# Patient Record
Sex: FEMALE | Race: WHITE | NOT HISPANIC OR LATINO
[De-identification: names, ages, dates, MRNs, and addresses within clinical notes are randomized per-mention and may not be internally consistent; named-entity substitution may affect disease eponyms.]

---

## 2017-01-06 ENCOUNTER — MEDICATION RENEWAL (OUTPATIENT)
Age: 67
End: 2017-01-06

## 2017-02-02 ENCOUNTER — APPOINTMENT (OUTPATIENT)
Dept: FAMILY MEDICINE | Facility: CLINIC | Age: 67
End: 2017-02-02

## 2017-02-02 VITALS — RESPIRATION RATE: 16 BRPM | SYSTOLIC BLOOD PRESSURE: 144 MMHG | DIASTOLIC BLOOD PRESSURE: 90 MMHG

## 2017-02-06 ENCOUNTER — MEDICATION RENEWAL (OUTPATIENT)
Age: 67
End: 2017-02-06

## 2017-04-06 ENCOUNTER — MEDICATION RENEWAL (OUTPATIENT)
Age: 67
End: 2017-04-06

## 2017-04-07 ENCOUNTER — RX RENEWAL (OUTPATIENT)
Age: 67
End: 2017-04-07

## 2017-04-10 ENCOUNTER — APPOINTMENT (OUTPATIENT)
Dept: FAMILY MEDICINE | Facility: CLINIC | Age: 67
End: 2017-04-10

## 2017-04-10 VITALS
WEIGHT: 228 LBS | BODY MASS INDEX: 40.4 KG/M2 | SYSTOLIC BLOOD PRESSURE: 136 MMHG | DIASTOLIC BLOOD PRESSURE: 84 MMHG | HEIGHT: 63 IN

## 2017-04-11 LAB
ALBUMIN SERPL ELPH-MCNC: 4.3 G/DL
ALP BLD-CCNC: 109 U/L
ALT SERPL-CCNC: 19 U/L
ANION GAP SERPL CALC-SCNC: 17 MMOL/L
AST SERPL-CCNC: 17 U/L
BASOPHILS # BLD AUTO: 0.02 K/UL
BASOPHILS NFR BLD AUTO: 0.3 %
BILIRUB SERPL-MCNC: 0.6 MG/DL
BUN SERPL-MCNC: 14 MG/DL
CALCIUM SERPL-MCNC: 9.5 MG/DL
CHLORIDE SERPL-SCNC: 104 MMOL/L
CO2 SERPL-SCNC: 24 MMOL/L
CREAT SERPL-MCNC: 1.07 MG/DL
EOSINOPHIL # BLD AUTO: 0.12 K/UL
EOSINOPHIL NFR BLD AUTO: 1.8 %
GLUCOSE SERPL-MCNC: 96 MG/DL
HCT VFR BLD CALC: 41.4 %
HGB BLD-MCNC: 13.4 G/DL
IMM GRANULOCYTES NFR BLD AUTO: 0 %
LYMPHOCYTES # BLD AUTO: 1.12 K/UL
LYMPHOCYTES NFR BLD AUTO: 16.9 %
MAN DIFF?: NORMAL
MCHC RBC-ENTMCNC: 28.6 PG
MCHC RBC-ENTMCNC: 32.4 GM/DL
MCV RBC AUTO: 88.5 FL
MONOCYTES # BLD AUTO: 0.49 K/UL
MONOCYTES NFR BLD AUTO: 7.4 %
NEUTROPHILS # BLD AUTO: 4.87 K/UL
NEUTROPHILS NFR BLD AUTO: 73.6 %
PLATELET # BLD AUTO: 279 K/UL
POTASSIUM SERPL-SCNC: 3.9 MMOL/L
PROT SERPL-MCNC: 6.3 G/DL
RBC # BLD: 4.68 M/UL
RBC # FLD: 12.9 %
SODIUM SERPL-SCNC: 145 MMOL/L
WBC # FLD AUTO: 6.62 K/UL

## 2017-05-08 ENCOUNTER — APPOINTMENT (OUTPATIENT)
Dept: FAMILY MEDICINE | Facility: CLINIC | Age: 67
End: 2017-05-08

## 2017-05-08 VITALS
WEIGHT: 228 LBS | DIASTOLIC BLOOD PRESSURE: 82 MMHG | HEIGHT: 63 IN | SYSTOLIC BLOOD PRESSURE: 130 MMHG | BODY MASS INDEX: 40.4 KG/M2

## 2017-06-09 ENCOUNTER — MEDICATION RENEWAL (OUTPATIENT)
Age: 67
End: 2017-06-09

## 2017-06-20 ENCOUNTER — MEDICATION RENEWAL (OUTPATIENT)
Age: 67
End: 2017-06-20

## 2017-08-07 ENCOUNTER — MEDICATION RENEWAL (OUTPATIENT)
Age: 67
End: 2017-08-07

## 2017-08-08 ENCOUNTER — APPOINTMENT (OUTPATIENT)
Dept: FAMILY MEDICINE | Facility: CLINIC | Age: 67
End: 2017-08-08
Payer: MEDICARE

## 2017-08-08 VITALS
HEIGHT: 63 IN | WEIGHT: 220 LBS | BODY MASS INDEX: 38.98 KG/M2 | DIASTOLIC BLOOD PRESSURE: 82 MMHG | SYSTOLIC BLOOD PRESSURE: 132 MMHG

## 2017-08-08 PROCEDURE — 99213 OFFICE O/P EST LOW 20 MIN: CPT

## 2017-08-08 RX ORDER — BUPROPION HYDROCHLORIDE 150 MG/1
150 TABLET, EXTENDED RELEASE ORAL
Qty: 30 | Refills: 0 | Status: DISCONTINUED | COMMUNITY
Start: 2017-04-07 | End: 2017-08-08

## 2017-09-08 ENCOUNTER — MEDICATION RENEWAL (OUTPATIENT)
Age: 67
End: 2017-09-08

## 2017-09-18 ENCOUNTER — MEDICATION RENEWAL (OUTPATIENT)
Age: 67
End: 2017-09-18

## 2017-10-09 ENCOUNTER — MEDICATION RENEWAL (OUTPATIENT)
Age: 67
End: 2017-10-09

## 2017-10-16 ENCOUNTER — MEDICATION RENEWAL (OUTPATIENT)
Age: 67
End: 2017-10-16

## 2017-11-08 ENCOUNTER — APPOINTMENT (OUTPATIENT)
Dept: FAMILY MEDICINE | Facility: CLINIC | Age: 67
End: 2017-11-08
Payer: MEDICARE

## 2017-11-08 VITALS
WEIGHT: 224 LBS | RESPIRATION RATE: 18 BRPM | TEMPERATURE: 99.4 F | BODY MASS INDEX: 39.69 KG/M2 | HEART RATE: 69 BPM | HEIGHT: 63 IN | SYSTOLIC BLOOD PRESSURE: 144 MMHG | DIASTOLIC BLOOD PRESSURE: 92 MMHG | OXYGEN SATURATION: 97 %

## 2017-11-08 PROCEDURE — 99214 OFFICE O/P EST MOD 30 MIN: CPT | Mod: 25

## 2017-11-08 PROCEDURE — 36415 COLL VENOUS BLD VENIPUNCTURE: CPT

## 2017-11-08 RX ORDER — TRAMADOL HYDROCHLORIDE 50 MG/1
50 TABLET, COATED ORAL 3 TIMES DAILY
Qty: 20 | Refills: 0 | Status: DISCONTINUED | COMMUNITY
Start: 2017-05-08 | End: 2017-11-08

## 2017-11-09 LAB
ALBUMIN SERPL ELPH-MCNC: 4.2 G/DL
ALP BLD-CCNC: 108 U/L
ALT SERPL-CCNC: 19 U/L
ANION GAP SERPL CALC-SCNC: 18 MMOL/L
AST SERPL-CCNC: 22 U/L
BASOPHILS # BLD AUTO: 0.01 K/UL
BASOPHILS NFR BLD AUTO: 0.2 %
BILIRUB SERPL-MCNC: 0.7 MG/DL
BUN SERPL-MCNC: 17 MG/DL
CALCIUM SERPL-MCNC: 10 MG/DL
CHLORIDE SERPL-SCNC: 103 MMOL/L
CHOLEST SERPL-MCNC: 231 MG/DL
CHOLEST/HDLC SERPL: 3 RATIO
CO2 SERPL-SCNC: 21 MMOL/L
CREAT SERPL-MCNC: 1.04 MG/DL
EOSINOPHIL # BLD AUTO: 0.15 K/UL
EOSINOPHIL NFR BLD AUTO: 2.7 %
GLUCOSE SERPL-MCNC: 111 MG/DL
HBA1C MFR BLD HPLC: 5.2 %
HCT VFR BLD CALC: 42.3 %
HDLC SERPL-MCNC: 77 MG/DL
HGB BLD-MCNC: 14 G/DL
IMM GRANULOCYTES NFR BLD AUTO: 0.2 %
LDLC SERPL CALC-MCNC: 124 MG/DL
LYMPHOCYTES # BLD AUTO: 0.83 K/UL
LYMPHOCYTES NFR BLD AUTO: 14.7 %
MAN DIFF?: NORMAL
MCHC RBC-ENTMCNC: 29.1 PG
MCHC RBC-ENTMCNC: 33.1 GM/DL
MCV RBC AUTO: 87.9 FL
MONOCYTES # BLD AUTO: 0.31 K/UL
MONOCYTES NFR BLD AUTO: 5.5 %
NEUTROPHILS # BLD AUTO: 4.35 K/UL
NEUTROPHILS NFR BLD AUTO: 76.7 %
PLATELET # BLD AUTO: 269 K/UL
POTASSIUM SERPL-SCNC: 4.1 MMOL/L
PROT SERPL-MCNC: 6.3 G/DL
RBC # BLD: 4.81 M/UL
RBC # FLD: 12.7 %
SODIUM SERPL-SCNC: 142 MMOL/L
T4 FREE SERPL-MCNC: 1.1 NG/DL
TRIGL SERPL-MCNC: 148 MG/DL
TSH SERPL-ACNC: 1.78 UIU/ML
WBC # FLD AUTO: 5.66 K/UL

## 2017-11-27 ENCOUNTER — APPOINTMENT (OUTPATIENT)
Dept: FAMILY MEDICINE | Facility: CLINIC | Age: 67
End: 2017-11-27
Payer: MEDICARE

## 2017-11-27 VITALS
HEART RATE: 68 BPM | OXYGEN SATURATION: 97 % | BODY MASS INDEX: 39.69 KG/M2 | SYSTOLIC BLOOD PRESSURE: 160 MMHG | DIASTOLIC BLOOD PRESSURE: 104 MMHG | TEMPERATURE: 98.6 F | RESPIRATION RATE: 18 BRPM | WEIGHT: 224 LBS | HEIGHT: 63 IN

## 2017-11-27 DIAGNOSIS — Z87.09 PERSONAL HISTORY OF OTHER DISEASES OF THE RESPIRATORY SYSTEM: ICD-10-CM

## 2017-11-27 DIAGNOSIS — Z63.4 DISAPPEARANCE AND DEATH OF FAMILY MEMBER: ICD-10-CM

## 2017-11-27 DIAGNOSIS — F15.90 OTHER STIMULANT USE, UNSPECIFIED, UNCOMPLICATED: ICD-10-CM

## 2017-11-27 PROCEDURE — 99213 OFFICE O/P EST LOW 20 MIN: CPT

## 2017-11-27 SDOH — SOCIAL STABILITY - SOCIAL INSECURITY: DISSAPEARANCE AND DEATH OF FAMILY MEMBER: Z63.4

## 2018-02-06 ENCOUNTER — MEDICATION RENEWAL (OUTPATIENT)
Age: 68
End: 2018-02-06

## 2018-03-06 ENCOUNTER — MEDICATION RENEWAL (OUTPATIENT)
Age: 68
End: 2018-03-06

## 2018-04-09 ENCOUNTER — APPOINTMENT (OUTPATIENT)
Dept: FAMILY MEDICINE | Facility: CLINIC | Age: 68
End: 2018-04-09
Payer: MEDICARE

## 2018-04-09 ENCOUNTER — MEDICATION RENEWAL (OUTPATIENT)
Age: 68
End: 2018-04-09

## 2018-04-09 VITALS
HEIGHT: 63 IN | BODY MASS INDEX: 39.69 KG/M2 | SYSTOLIC BLOOD PRESSURE: 150 MMHG | DIASTOLIC BLOOD PRESSURE: 90 MMHG | OXYGEN SATURATION: 96 % | WEIGHT: 224 LBS | HEART RATE: 64 BPM

## 2018-04-09 DIAGNOSIS — G47.19 OTHER HYPERSOMNIA: ICD-10-CM

## 2018-04-09 DIAGNOSIS — Z87.09 PERSONAL HISTORY OF OTHER DISEASES OF THE RESPIRATORY SYSTEM: ICD-10-CM

## 2018-04-09 PROCEDURE — 99213 OFFICE O/P EST LOW 20 MIN: CPT

## 2018-04-09 RX ORDER — PREDNISONE 20 MG/1
20 TABLET ORAL
Qty: 12 | Refills: 0 | Status: DISCONTINUED | COMMUNITY
Start: 2017-11-28 | End: 2018-04-09

## 2018-04-09 RX ORDER — LEVOFLOXACIN 500 MG/1
500 TABLET, FILM COATED ORAL DAILY
Qty: 7 | Refills: 0 | Status: DISCONTINUED | COMMUNITY
Start: 2017-11-27 | End: 2018-04-09

## 2018-04-09 RX ORDER — CEFUROXIME AXETIL 500 MG/1
500 TABLET ORAL
Qty: 20 | Refills: 0 | Status: DISCONTINUED | COMMUNITY
Start: 2017-11-27 | End: 2018-04-09

## 2018-04-09 RX ORDER — ALBUTEROL SULFATE 90 UG/1
108 (90 BASE) AEROSOL, METERED RESPIRATORY (INHALATION)
Qty: 1 | Refills: 0 | Status: DISCONTINUED | COMMUNITY
Start: 2017-11-27 | End: 2018-04-09

## 2018-05-11 ENCOUNTER — MEDICATION RENEWAL (OUTPATIENT)
Age: 68
End: 2018-05-11

## 2018-05-14 ENCOUNTER — MEDICATION RENEWAL (OUTPATIENT)
Age: 68
End: 2018-05-14

## 2018-06-01 ENCOUNTER — MEDICATION RENEWAL (OUTPATIENT)
Age: 68
End: 2018-06-01

## 2018-06-25 ENCOUNTER — APPOINTMENT (OUTPATIENT)
Dept: FAMILY MEDICINE | Facility: CLINIC | Age: 68
End: 2018-06-25
Payer: MEDICARE

## 2018-06-25 VITALS
BODY MASS INDEX: 39.51 KG/M2 | DIASTOLIC BLOOD PRESSURE: 90 MMHG | HEART RATE: 62 BPM | WEIGHT: 223 LBS | TEMPERATURE: 98 F | HEIGHT: 63 IN | OXYGEN SATURATION: 96 % | SYSTOLIC BLOOD PRESSURE: 130 MMHG

## 2018-06-25 PROCEDURE — 99214 OFFICE O/P EST MOD 30 MIN: CPT

## 2018-06-25 NOTE — PHYSICAL EXAM
[Well Nourished] : well nourished [Well Developed] : well developed [PERRL] : pupils equal round and reactive to light [Normal Oropharynx] : the oropharynx was normal [No Lymphadenopathy] : no lymphadenopathy [Clear to Auscultation] : lungs were clear to auscultation bilaterally [Regular Rhythm] : with a regular rhythm [Normal S1, S2] : normal S1 and S2 [Soft] : abdomen soft [Non Tender] : non-tender [Normal Posterior Cervical Nodes] : no posterior cervical lymphadenopathy [Normal Anterior Cervical Nodes] : no anterior cervical lymphadenopathy [No CVA Tenderness] : no CVA  tenderness [No Spinal Tenderness] : no spinal tenderness [No Rash] : no rash [Normal Gait] : normal gait [Coordination Grossly Intact] : coordination grossly intact [Normal Affect] : the affect was normal [Normal Insight/Judgement] : insight and judgment were intact

## 2018-06-25 NOTE — REVIEW OF SYSTEMS
[Itching] : itching [Nasal Discharge] : nasal discharge [Postnasal Drip] : postnasal drip [Insomnia] : insomnia [Depression] : depression [Negative] : Heme/Lymph

## 2018-06-25 NOTE — HEALTH RISK ASSESSMENT
[No falls in past year] : Patient reported no falls in the past year [] : No [2] : 2) Feeling down, depressed, or hopeless for more than half of the days (2) [ZSN3Lnvau] : 4 [WEZ4Whhch] : 11

## 2018-06-25 NOTE — ASSESSMENT
[FreeTextEntry1] : Discussed diagnosis of anxiety and depression with the patient and potential outcomes/side affects of treatment versus non treatment. Medications were assessed and described at length. Side affects and black box warning were discussed.  Patient was advised to continue will all medications prescribed and the need for compliance was discussed and emphasized. Patient was advised to not stop medications without discussing with a health care provider first. Patient was advised to continue psychotherapy or seek therapy if not currently attending. Patient was educated on addictive potential of controlled substances and was counseled to use only as needed and sparingly. Patient verbalized understanding of all the above.\par wants to try cymbalta - fu in 4 weeks\par \par allergies - try singulair\par

## 2018-06-25 NOTE — HISTORY OF PRESENT ILLNESS
[FreeTextEntry8] : 67 year old female here with complaints of allergies - have tried every otc without improvement.\par  had sleep study. mild sleep apnea. feeling better with the cpap machine\par  Patients active medications, allergies and issues were all reviewed with the patient at time of visit.\par weaned herself off of celexa - however feels like hse needs it. \par

## 2018-07-05 ENCOUNTER — MEDICATION RENEWAL (OUTPATIENT)
Age: 68
End: 2018-07-05

## 2018-07-09 ENCOUNTER — RX RENEWAL (OUTPATIENT)
Age: 68
End: 2018-07-09

## 2018-07-09 ENCOUNTER — APPOINTMENT (OUTPATIENT)
Dept: FAMILY MEDICINE | Facility: CLINIC | Age: 68
End: 2018-07-09
Payer: MEDICARE

## 2018-07-09 VITALS
TEMPERATURE: 98 F | SYSTOLIC BLOOD PRESSURE: 130 MMHG | HEIGHT: 63 IN | DIASTOLIC BLOOD PRESSURE: 80 MMHG | BODY MASS INDEX: 38.98 KG/M2 | WEIGHT: 220 LBS

## 2018-07-09 LAB — CYTOLOGY CVX/VAG DOC THIN PREP: NORMAL

## 2018-07-09 PROCEDURE — 99213 OFFICE O/P EST LOW 20 MIN: CPT

## 2018-07-09 RX ORDER — DULOXETINE HYDROCHLORIDE 60 MG/1
60 CAPSULE, DELAYED RELEASE PELLETS ORAL
Qty: 30 | Refills: 0 | Status: DISCONTINUED | COMMUNITY
Start: 2018-06-25 | End: 2018-07-09

## 2018-07-09 NOTE — HEALTH RISK ASSESSMENT
[] : No [No falls in past year] : Patient reported no falls in the past year [2] : 2) Feeling down, depressed, or hopeless for more than half of the days (2) [AEW8Bgcdj] : 4 [WVK8Dxftv] : 11

## 2018-07-09 NOTE — HISTORY OF PRESENT ILLNESS
[FreeTextEntry8] : 67 year old female here with complaints of allergies - have tried every otc without improvement.\par  had sleep study. mild sleep apnea. feeling better with the cpap machine\par  Patients active medications, allergies and issues were all reviewed with the patient at time of visit.\par weaned herself off of celexa - however feels like hse needs it. \par tried cymbalta, did not like it\par has positive results with wellbutrin\par

## 2018-07-09 NOTE — ASSESSMENT
[FreeTextEntry1] : Discussed diagnosis of anxiety and depression with the patient and potential outcomes/side affects of treatment versus non treatment. Medications were assessed and described at length. Side affects and black box warning were discussed.  Patient was advised to continue will all medications prescribed and the need for compliance was discussed and emphasized. Patient was advised to not stop medications without discussing with a health care provider first. Patient was advised to continue psychotherapy or seek therapy if not currently attending. Patient was educated on addictive potential of controlled substances and was counseled to use only as needed and sparingly. Patient verbalized understanding of all the above.\par failed celexa and cymbalta\par wants to go back on wellbutrin\par \par allergies - singulair working well\par

## 2018-07-23 ENCOUNTER — MEDICATION RENEWAL (OUTPATIENT)
Age: 68
End: 2018-07-23

## 2018-08-27 ENCOUNTER — MEDICATION RENEWAL (OUTPATIENT)
Age: 68
End: 2018-08-27

## 2018-09-05 ENCOUNTER — MEDICATION RENEWAL (OUTPATIENT)
Age: 68
End: 2018-09-05

## 2018-09-27 ENCOUNTER — MEDICATION RENEWAL (OUTPATIENT)
Age: 68
End: 2018-09-27

## 2018-09-27 ENCOUNTER — RX RENEWAL (OUTPATIENT)
Age: 68
End: 2018-09-27

## 2018-10-15 ENCOUNTER — APPOINTMENT (OUTPATIENT)
Dept: FAMILY MEDICINE | Facility: CLINIC | Age: 68
End: 2018-10-15
Payer: MEDICARE

## 2018-10-15 VITALS
DIASTOLIC BLOOD PRESSURE: 78 MMHG | WEIGHT: 216 LBS | HEIGHT: 63 IN | RESPIRATION RATE: 18 BRPM | OXYGEN SATURATION: 97 % | HEART RATE: 58 BPM | BODY MASS INDEX: 38.27 KG/M2 | SYSTOLIC BLOOD PRESSURE: 134 MMHG

## 2018-10-15 PROCEDURE — 36415 COLL VENOUS BLD VENIPUNCTURE: CPT

## 2018-10-15 PROCEDURE — 99214 OFFICE O/P EST MOD 30 MIN: CPT | Mod: 25

## 2018-10-15 NOTE — HISTORY OF PRESENT ILLNESS
[FreeTextEntry8] : 67 year old female is here for a followup visit. Patient is here for medication renewals and for blood work discussion. Medications and allergies were reviewed and assessed.  There has been no new medications since the last visit. Patient is feeling well with no active changes or issues since Her last visit.\par \par  had sleep study. mild sleep apnea. feeling better with the cpap machine\par  Patients active medications, allergies and issues were all reviewed with the patient at time of visit.\par \par tried cymbalta, did not like it\par has positive results with wellbutrin\par bad mood - doesn’t get along with sister and her rent was increased

## 2018-10-15 NOTE — ASSESSMENT
[FreeTextEntry1] : Discussed diagnosis of anxiety and depression with the patient and potential outcomes/side affects of treatment versus non treatment. Medications were assessed and described at length. Side affects and black box warning were discussed.  Patient was advised to continue will all medications prescribed and the need for compliance was discussed and emphasized. Patient was advised to not stop medications without discussing with a health care provider first. Patient was advised to continue psychotherapy or seek therapy if not currently attending. Patient was educated on addictive potential of controlled substances and was counseled to use only as needed and sparingly. Patient verbalized understanding of all the above.\par doing okay with wellbutrin, but still not happy and is blah\par does not want to try anything else\par \par \par allergies - singulair works a little\par \par The patient has a diagnosis of hypertension. Blood work was drawn in office and will be followed.  The diagnosis was discussed with patient and need for medication compliance and possible side affects and risks of noncompliance. Patient was told to adhere to a low salt diet and try to incorporate exercise daily.\par \par refuses flu and pneumonia shots - advised on risks

## 2018-10-15 NOTE — HEALTH RISK ASSESSMENT
[] : No [No falls in past year] : Patient reported no falls in the past year [2] : 2) Feeling down, depressed, or hopeless for more than half of the days (2) [JPM5Xzbsb] : 4 [MBP9Egcfq] : 11

## 2018-10-16 LAB
ALBUMIN SERPL ELPH-MCNC: 4.4 G/DL
ALP BLD-CCNC: 104 U/L
ALT SERPL-CCNC: 20 U/L
ANION GAP SERPL CALC-SCNC: 14 MMOL/L
AST SERPL-CCNC: 18 U/L
BASOPHILS # BLD AUTO: 0.02 K/UL
BASOPHILS NFR BLD AUTO: 0.3 %
BILIRUB SERPL-MCNC: 0.3 MG/DL
BUN SERPL-MCNC: 21 MG/DL
CALCIUM SERPL-MCNC: 9.7 MG/DL
CHLORIDE SERPL-SCNC: 104 MMOL/L
CHOLEST SERPL-MCNC: 192 MG/DL
CHOLEST/HDLC SERPL: 2.7 RATIO
CO2 SERPL-SCNC: 25 MMOL/L
CREAT SERPL-MCNC: 0.91 MG/DL
EOSINOPHIL # BLD AUTO: 0.18 K/UL
EOSINOPHIL NFR BLD AUTO: 2.8 %
GLUCOSE SERPL-MCNC: 90 MG/DL
HBA1C MFR BLD HPLC: 5.4 %
HCT VFR BLD CALC: 41.2 %
HDLC SERPL-MCNC: 72 MG/DL
HGB BLD-MCNC: 13.8 G/DL
IMM GRANULOCYTES NFR BLD AUTO: 0.3 %
LDLC SERPL CALC-MCNC: 94 MG/DL
LYMPHOCYTES # BLD AUTO: 1.15 K/UL
LYMPHOCYTES NFR BLD AUTO: 17.9 %
MAN DIFF?: NORMAL
MCHC RBC-ENTMCNC: 30.2 PG
MCHC RBC-ENTMCNC: 33.5 GM/DL
MCV RBC AUTO: 90.2 FL
MONOCYTES # BLD AUTO: 0.39 K/UL
MONOCYTES NFR BLD AUTO: 6.1 %
NEUTROPHILS # BLD AUTO: 4.65 K/UL
NEUTROPHILS NFR BLD AUTO: 72.6 %
PLATELET # BLD AUTO: 264 K/UL
POTASSIUM SERPL-SCNC: 4.5 MMOL/L
PROT SERPL-MCNC: 6.2 G/DL
RBC # BLD: 4.57 M/UL
RBC # FLD: 13.3 %
SODIUM SERPL-SCNC: 143 MMOL/L
TRIGL SERPL-MCNC: 131 MG/DL
TSH SERPL-ACNC: 2.73 UIU/ML
WBC # FLD AUTO: 6.41 K/UL

## 2018-10-25 ENCOUNTER — MEDICATION RENEWAL (OUTPATIENT)
Age: 68
End: 2018-10-25

## 2018-11-27 ENCOUNTER — MEDICATION RENEWAL (OUTPATIENT)
Age: 68
End: 2018-11-27

## 2018-12-11 ENCOUNTER — MEDICATION RENEWAL (OUTPATIENT)
Age: 68
End: 2018-12-11

## 2018-12-26 ENCOUNTER — MEDICATION RENEWAL (OUTPATIENT)
Age: 68
End: 2018-12-26

## 2019-01-04 ENCOUNTER — APPOINTMENT (OUTPATIENT)
Dept: FAMILY MEDICINE | Facility: CLINIC | Age: 69
End: 2019-01-04
Payer: MEDICARE

## 2019-01-04 VITALS
HEART RATE: 79 BPM | HEIGHT: 63 IN | BODY MASS INDEX: 38.09 KG/M2 | RESPIRATION RATE: 18 BRPM | SYSTOLIC BLOOD PRESSURE: 138 MMHG | WEIGHT: 215 LBS | OXYGEN SATURATION: 96 % | DIASTOLIC BLOOD PRESSURE: 86 MMHG

## 2019-01-04 DIAGNOSIS — J06.9 ACUTE UPPER RESPIRATORY INFECTION, UNSPECIFIED: ICD-10-CM

## 2019-01-04 PROCEDURE — 99213 OFFICE O/P EST LOW 20 MIN: CPT

## 2019-01-04 NOTE — HEALTH RISK ASSESSMENT
[] : No [No falls in past year] : Patient reported no falls in the past year [2] : 2) Feeling down, depressed, or hopeless for more than half of the days (2) [ODL9Tfkjm] : 4 [ENP0Jvast] : 11

## 2019-01-04 NOTE — HISTORY OF PRESENT ILLNESS
[FreeTextEntry8] : 68 year old female here with complaints of progressive congestion and sore throat since 12/26/  went to urgent care on 1/1/19 and was given meds, however feels worse. Patients active medications, allergies and issues were all reviewed with the patient at time of visit.\par

## 2019-01-04 NOTE — REVIEW OF SYSTEMS
[Itching] : itching [Nasal Discharge] : nasal discharge [Postnasal Drip] : postnasal drip [Cough] : cough [Insomnia] : insomnia [Depression] : depression [Negative] : Heme/Lymph

## 2019-01-11 ENCOUNTER — MEDICATION RENEWAL (OUTPATIENT)
Age: 69
End: 2019-01-11

## 2019-01-15 ENCOUNTER — MEDICATION RENEWAL (OUTPATIENT)
Age: 69
End: 2019-01-15

## 2019-01-23 ENCOUNTER — APPOINTMENT (OUTPATIENT)
Dept: FAMILY MEDICINE | Facility: CLINIC | Age: 69
End: 2019-01-23
Payer: MEDICARE

## 2019-01-23 ENCOUNTER — NON-APPOINTMENT (OUTPATIENT)
Age: 69
End: 2019-01-23

## 2019-01-23 VITALS
WEIGHT: 215 LBS | HEIGHT: 63 IN | BODY MASS INDEX: 38.09 KG/M2 | DIASTOLIC BLOOD PRESSURE: 80 MMHG | SYSTOLIC BLOOD PRESSURE: 130 MMHG

## 2019-01-23 DIAGNOSIS — Z23 ENCOUNTER FOR IMMUNIZATION: ICD-10-CM

## 2019-01-23 PROCEDURE — G0009: CPT

## 2019-01-23 PROCEDURE — 93000 ELECTROCARDIOGRAM COMPLETE: CPT

## 2019-01-23 PROCEDURE — 90670 PCV13 VACCINE IM: CPT

## 2019-01-23 PROCEDURE — G0438: CPT

## 2019-01-23 RX ORDER — PREDNISONE 20 MG/1
20 TABLET ORAL
Qty: 12 | Refills: 0 | Status: DISCONTINUED | COMMUNITY
Start: 2019-01-04 | End: 2019-01-23

## 2019-01-23 RX ORDER — AZITHROMYCIN 250 MG/1
250 TABLET, FILM COATED ORAL
Qty: 1 | Refills: 0 | Status: DISCONTINUED | COMMUNITY
Start: 2019-01-04 | End: 2019-01-23

## 2019-01-23 RX ORDER — HYDROCODONE BITARTRATE AND HOMATROPINE METHYLBROMIDE 5; 1.5 MG/5ML; MG/5ML
5-1.5 SOLUTION ORAL
Qty: 120 | Refills: 0 | Status: DISCONTINUED | COMMUNITY
Start: 2019-01-14 | End: 2019-01-23

## 2019-01-23 RX ORDER — HYDROCODONE BITARTRATE AND HOMATROPINE METHYLBROMIDE 5; 1.5 MG/5ML; MG/5ML
5-1.5 SOLUTION ORAL
Qty: 120 | Refills: 0 | Status: DISCONTINUED | COMMUNITY
Start: 2019-01-04 | End: 2019-01-23

## 2019-01-23 NOTE — HISTORY OF PRESENT ILLNESS
[FreeTextEntry8] : 68 year old female  here for annual well visit. Patient's blood work was drawn and medications reviewed. Patient's past medical history was reviewed, allergies verified and problems were identified and assessed. Patients medications were reviewed. Patient is feeling well with no new or active complaints at this time.\par \par  had sleep study. mild sleep apnea. feeling better with the cpap machine\par  Patients active medications, allergies and issues were all reviewed with the patient at time of visit.\par \par tried cymbalta, did not like it\par has positive results with wellbutrin\par bad mood - doesn’t get along with sister and her rent was increased - now doing well on wellbutrin 300

## 2019-01-23 NOTE — ASSESSMENT
[FreeTextEntry1] : Discussed diagnosis of anxiety and depression with the patient and potential outcomes/side affects of treatment versus non treatment. Medications were assessed and described at length. Side affects and black box warning were discussed.  Patient was advised to continue will all medications prescribed and the need for compliance was discussed and emphasized. Patient was advised to not stop medications without discussing with a health care provider first. Patient was advised to continue psychotherapy or seek therapy if not currently attending. Patient was educated on addictive potential of controlled substances and was counseled to use only as needed and sparingly. Patient verbalized understanding of all the above.\par doing okay with wellbutrin, feeling much better better\par wants to do things\par no more "blah" - more up than down\par \par allergies - singulair works a little\par \par The patient has a diagnosis of hypertension. Blood work was drawn in office and will be followed.  The diagnosis was discussed with patient and need for medication compliance and possible side affects and risks of noncompliance. Patient was told to adhere to a low salt diet and try to incorporate exercise daily.\par \par Patient was given a vaccine and was counseled regarding all side affects. Patient was advised to ice the area if necessary if it is tender or red. Patient was told to return to office if has any fever, nausea, vomiting or increased pain.\par

## 2019-02-10 ENCOUNTER — RX RENEWAL (OUTPATIENT)
Age: 69
End: 2019-02-10

## 2019-04-08 ENCOUNTER — APPOINTMENT (OUTPATIENT)
Dept: FAMILY MEDICINE | Facility: CLINIC | Age: 69
End: 2019-04-08

## 2019-05-01 ENCOUNTER — APPOINTMENT (OUTPATIENT)
Dept: FAMILY MEDICINE | Facility: CLINIC | Age: 69
End: 2019-05-01
Payer: MEDICARE

## 2019-05-01 VITALS
HEART RATE: 71 BPM | RESPIRATION RATE: 18 BRPM | DIASTOLIC BLOOD PRESSURE: 80 MMHG | OXYGEN SATURATION: 98 % | SYSTOLIC BLOOD PRESSURE: 118 MMHG | HEIGHT: 63 IN | BODY MASS INDEX: 38.36 KG/M2 | WEIGHT: 216.5 LBS

## 2019-05-01 PROCEDURE — 99214 OFFICE O/P EST MOD 30 MIN: CPT | Mod: 25

## 2019-05-01 PROCEDURE — 36415 COLL VENOUS BLD VENIPUNCTURE: CPT

## 2019-05-01 NOTE — ASSESSMENT
[FreeTextEntry1] : DEPRESSION/ANXIETY\par Discussed diagnosis of anxiety and depression with the patient and potential outcomes/side affects of treatment versus non treatment. Medications were assessed and described at length. Side affects and black box warning were discussed.  Patient was advised to continue will all medications prescribed and the need for compliance was discussed and emphasized. Patient was advised to not stop medications without discussing with a health care provider first. Patient was advised to continue psychotherapy or seek therapy if not currently attending. Patient was educated on addictive potential of controlled substances and was counseled to use only as needed and sparingly. Patient verbalized understanding of all the above.\par doing okay with wellbutrin, feeling much better better\par wants to do things\par no more "blah" - more up than down\par however sister is giving her a lot of stress\par \par ALLERGIES\par singulair works a little, continue\par add advair\par \par HTN\par The patient has a diagnosis of hypertension. Blood work was drawn in office and will be followed.  The diagnosis was discussed with patient and need for medication compliance and possible side affects and risks of noncompliance. Patient was told to adhere to a low salt diet and try to incorporate exercise daily.\par \par BACK PAIN\par will try mobic\par want to stay away from controlled substances\par

## 2019-05-01 NOTE — HEALTH RISK ASSESSMENT
[] : No [No falls in past year] : Patient reported no falls in the past year [2] : 2) Feeling down, depressed, or hopeless for more than half of the days (2) [DIQ7Duapz] : 4 [ABU5Wrpwf] : 11 [Good] : ~his/her~  mood as  good [Patient declined mammogram] : Patient declined mammogram [Patient declined PAP Smear] : Patient declined PAP Smear [HIV Test offered] : HIV Test offered [Patient declined colonoscopy] : Patient declined colonoscopy [Hepatitis C test offered] : Hepatitis C test offered [With Family] : lives with family [# Of Children ___] : has [unfilled] children [Fully functional (bathing, dressing, toileting, transferring, walking, feeding)] : Fully functional (bathing, dressing, toileting, transferring, walking, feeding) [Fully functional (using the telephone, shopping, preparing meals, housekeeping, doing laundry, using] : Fully functional and needs no help or supervision to perform IADLs (using the telephone, shopping, preparing meals, housekeeping, doing laundry, using transportation, managing medications and managing finances) [Smoke Detector] : smoke detector [Seat Belt] :  uses seat belt [de-identified] : lives with sister [Discussed at today's visit] : Advance Directives Discussed at today's visit

## 2019-05-01 NOTE — PHYSICAL EXAM
[Well Developed] : well developed [Well Nourished] : well nourished [PERRL] : pupils equal round and reactive to light [Normal Oropharynx] : the oropharynx was normal [Regular Rhythm] : with a regular rhythm [Clear to Auscultation] : lungs were clear to auscultation bilaterally [No Lymphadenopathy] : no lymphadenopathy [Soft] : abdomen soft [Normal S1, S2] : normal S1 and S2 [Non Tender] : non-tender [Normal Posterior Cervical Nodes] : no posterior cervical lymphadenopathy [No CVA Tenderness] : no CVA  tenderness [Normal Anterior Cervical Nodes] : no anterior cervical lymphadenopathy [No Rash] : no rash [No Spinal Tenderness] : no spinal tenderness [Normal Affect] : the affect was normal [Normal Gait] : normal gait [Coordination Grossly Intact] : coordination grossly intact [Normal Insight/Judgement] : insight and judgment were intact

## 2019-05-01 NOTE — REVIEW OF SYSTEMS
[Cough] : cough [Insomnia] : insomnia [Depression] : depression [Anxiety] : anxiety [Negative] : Heme/Lymph

## 2019-05-01 NOTE — HISTORY OF PRESENT ILLNESS
[FreeTextEntry8] : 68 year old female is here for a followup visit. Patient is here for medication renewals and for blood work discussion. Medications and allergies were reviewed and assessed.  There has been no new medications since the last visit. \par \par has multple complaints\par back pain\par allergies\par anxiety\par \par  had sleep study. mild sleep apnea. feeling better with the cpap machine\par  Patients active medications, allergies and issues were all reviewed with the patient at time of visit.\par \par tried cymbalta, did not like it\par has positive results with wellbutrin\par bad mood - doesn’t get along with sister and her rent was increased - now doing well on wellbutrin 300

## 2019-05-02 LAB
ALBUMIN SERPL ELPH-MCNC: 4.8 G/DL
ALP BLD-CCNC: 102 U/L
ALT SERPL-CCNC: 14 U/L
ANION GAP SERPL CALC-SCNC: 12 MMOL/L
AST SERPL-CCNC: 16 U/L
BASOPHILS # BLD AUTO: 0.03 K/UL
BASOPHILS NFR BLD AUTO: 0.5 %
BILIRUB SERPL-MCNC: 0.4 MG/DL
BUN SERPL-MCNC: 17 MG/DL
CALCIUM SERPL-MCNC: 10.2 MG/DL
CHLORIDE SERPL-SCNC: 102 MMOL/L
CHOLEST SERPL-MCNC: 219 MG/DL
CHOLEST/HDLC SERPL: 3.1 RATIO
CO2 SERPL-SCNC: 26 MMOL/L
CREAT SERPL-MCNC: 1.05 MG/DL
EOSINOPHIL # BLD AUTO: 0.11 K/UL
EOSINOPHIL NFR BLD AUTO: 1.9 %
ESTIMATED AVERAGE GLUCOSE: 103 MG/DL
GLUCOSE SERPL-MCNC: 83 MG/DL
HBA1C MFR BLD HPLC: 5.2 %
HCT VFR BLD CALC: 45 %
HDLC SERPL-MCNC: 71 MG/DL
HGB BLD-MCNC: 14.2 G/DL
IMM GRANULOCYTES NFR BLD AUTO: 0.2 %
LDLC SERPL CALC-MCNC: 123 MG/DL
LYMPHOCYTES # BLD AUTO: 1.04 K/UL
LYMPHOCYTES NFR BLD AUTO: 18.1 %
MAN DIFF?: NORMAL
MCHC RBC-ENTMCNC: 29.3 PG
MCHC RBC-ENTMCNC: 31.6 GM/DL
MCV RBC AUTO: 92.8 FL
MONOCYTES # BLD AUTO: 0.44 K/UL
MONOCYTES NFR BLD AUTO: 7.7 %
NEUTROPHILS # BLD AUTO: 4.11 K/UL
NEUTROPHILS NFR BLD AUTO: 71.6 %
PLATELET # BLD AUTO: 266 K/UL
POTASSIUM SERPL-SCNC: 4.3 MMOL/L
PROT SERPL-MCNC: 6.5 G/DL
RBC # BLD: 4.85 M/UL
RBC # FLD: 12.7 %
SODIUM SERPL-SCNC: 140 MMOL/L
TRIGL SERPL-MCNC: 127 MG/DL
WBC # FLD AUTO: 5.74 K/UL

## 2019-05-24 ENCOUNTER — MEDICATION RENEWAL (OUTPATIENT)
Age: 69
End: 2019-05-24

## 2019-07-26 ENCOUNTER — MEDICATION RENEWAL (OUTPATIENT)
Age: 69
End: 2019-07-26

## 2019-08-05 ENCOUNTER — MEDICATION RENEWAL (OUTPATIENT)
Age: 69
End: 2019-08-05

## 2019-08-20 ENCOUNTER — APPOINTMENT (OUTPATIENT)
Dept: ORTHOPEDIC SURGERY | Facility: CLINIC | Age: 69
End: 2019-08-20
Payer: MEDICARE

## 2019-08-20 VITALS
SYSTOLIC BLOOD PRESSURE: 148 MMHG | DIASTOLIC BLOOD PRESSURE: 77 MMHG | BODY MASS INDEX: 38.45 KG/M2 | HEART RATE: 59 BPM | HEIGHT: 63 IN | WEIGHT: 217 LBS

## 2019-08-20 PROCEDURE — 20610 DRAIN/INJ JOINT/BURSA W/O US: CPT | Mod: RT

## 2019-08-20 PROCEDURE — 99204 OFFICE O/P NEW MOD 45 MIN: CPT | Mod: 25

## 2019-08-20 PROCEDURE — 73564 X-RAY EXAM KNEE 4 OR MORE: CPT | Mod: RT

## 2019-08-20 NOTE — DISCUSSION/SUMMARY
[de-identified] : this patient does have severe patellofemoral arthritis in her right knee. At this time she'll try to bring her weight down at this time she wants conservative measures.  In the future however she may benefit from total knee replacement.. She'll return in 3 months for followup. Impression severe patellofemoral osteoarthritis right knee.  It is of concern that she did have a pulmonary embolus after the surgery on her left shoulder.  If she would require surgery in the future she should be evaluated closely by her medical doctors.  Return visit in 3 months.

## 2019-08-20 NOTE — PROCEDURE
[de-identified] : Procedure Note:\par \par Anatomic Location:  Right Knee\par \par Diagnosis:  Arthritis\par \par Procedure:  Injection of 2 cc of Marcaine 0.5% plain and Depo-Medrol 1 cc, 40 mg.\par \par Local Spray: Ethyl Chloride.\par \par Skin preparation with Alcohol.\par \par \par Patient has consented for the procedure.\par \par Injection  through a lateral parapatella approach.\par \par Patient tolerated the procedure well.\par \par Patient instructed to call the office if any reaction, fever, chills, increased erythema or swelling.   [phone].\par \par

## 2019-08-20 NOTE — HISTORY OF PRESENT ILLNESS
[de-identified] : Ms. BAILEY CISNEROS is a 68 year female who presents to office complaining of right knee pain, worse since falling on her knee on 6/27/19.\par She is here for a 2nd opinion regarding needing total knee replacement.\par She previously saw Dr. Manjinder Diaz at [facility] and [facility].\par Presents to office with MRI right knee DOS 8/2/19 showing tricompartmental degeneration as well as significant anterior prepatellar soft tissue bursitis.\par She received a cortisone injection to the right knee on 8/10/19 by Dr. Diaz, which helped to a degree.\par Patient says her pain is constant and is dull/achy in nature.\par Exacerbating factors include prolonged weightbearing and kneeling on the knee.\par Relieving factors include rest and ice.\par Denies buckling, locking, numbness, tingling, etc.\par Patient does not want any further testing since she has an MRI of her right knee that she presents with.\par All review of systems not previously stated as positive are negative.

## 2019-08-20 NOTE — PHYSICAL EXAM
[FreeTextEntry2] : she is 5 foot 3 inches tall weighs 217 pounds.\par \par Constitutional - the patient is of normal build but overweight ; and oriented to person, time, and  place.  Mood is normal.  There are no obvious major deformities. Vital signs as recorded are as recorded.  The patients gait is with pain in her right knee. The patient has normal balance.  The patient  can easily stand on toes and heels.\par \par The patient has no difficulty with respiration. The rate is normal. The patient is not short of breath and has not become short of breath with short ambulation. There is no audible wheezing. No coughing.\par \par Skin is normal with no significant rashes or lesions on the lower extremities.  He has a scar over the anterior left knee when she had an injury and then it began affected and it was treated well but this was many years ago.  There are no abnormal masses or lymph nodes in the lower extremities.\par \par Deep tendon reflexes are symmetric as is sensation and motor function.\par \par UPPER EXTREMITIES \par He had a shoulder surgery 2 years ago.  Following the surgery she did have a pulmonary embolus.  She was treated conservatively and is does satisfy events.  She is not on a blood thinner at this time other than aspirin.\par \par Shoulders ROM  is  satisfactory. \par  This patient can use a cane or support device if/or when  necessary.\par \par \par Circulation appears satisfactory with pedal pulses present.  There is no major edema in the lower legs. No skin tenderness or increased temperature.\par \par LOWER EXTREMITIES-  The skin shows no major abnormalities bilaterally.\par \par \par \par HIP EXAMINATION\par \par Motion\par Hip flexion                               *[Right 135   Left 135]\par Hip abduction                         *[Right 70      Left 70]\par Hip adduction                         *[Right 35     Left 35]\par Hip external  Rotation             *[Right 65     Left 65]\par Hip Internal Rotation              *[Right 20      Left 20]\par Hip Extension                         *[Right 0        Left 0]\par \par The hips have good range of motion. There is good strength across the hips. There is no crepitus in either hip. The alignment of the hips are normal.\par \par \par KNEE EXAMINATION\par \par Motion\par Right Knee    did recently fall on her right knee making it worse.  At this time she has full extension and flexes to 120 degrees she has crepitus behind her patella she has marked pain with compression of her patella and diffuse pain over the medial and the lateral joint line she has no pre-patella bursitis at this time and no Baker's cyst.             \par Left  Knee     from 0 to 120 degrees she has good medial lateral and anterior posterior stability.  Here she does not have pain behind the patella her patella tracks satisfactorily.  She has no effusion and no Baker's cyst.\par \par \par \par Ankle \par Ankle Plantar Flexion              Right  45 degrees             Left 45 degrees\par Ankle Dorsiflexion                   Right  15 degrees             Left 15 degrees\par \par  [de-identified] : 4 views of the right knee showing the AP view and 45° PA view joint spaces are maintained between the femur and the tibia. Her skyline on the right show significant degenerative changes lateral facet of the patella the patellofemoral joint. Impression patellofemoral arthritis.\par \par She has had an MRI which also showed the patellofemoral arthritis present and some and degenerative changes of the medial lateral menisci but without definitive tears.

## 2019-08-29 ENCOUNTER — APPOINTMENT (OUTPATIENT)
Dept: FAMILY MEDICINE | Facility: CLINIC | Age: 69
End: 2019-08-29
Payer: MEDICARE

## 2019-08-29 VITALS
OXYGEN SATURATION: 97 % | SYSTOLIC BLOOD PRESSURE: 130 MMHG | HEART RATE: 57 BPM | HEIGHT: 63 IN | WEIGHT: 210 LBS | BODY MASS INDEX: 37.21 KG/M2 | DIASTOLIC BLOOD PRESSURE: 70 MMHG | RESPIRATION RATE: 16 BRPM

## 2019-08-29 PROCEDURE — 99213 OFFICE O/P EST LOW 20 MIN: CPT

## 2019-08-29 RX ORDER — FLUTICASONE PROPIONATE AND SALMETEROL 50; 250 UG/1; UG/1
250-50 POWDER RESPIRATORY (INHALATION)
Qty: 1 | Refills: 3 | Status: DISCONTINUED | COMMUNITY
Start: 2019-05-01 | End: 2019-08-29

## 2019-08-29 RX ORDER — MELOXICAM 15 MG/1
15 TABLET ORAL
Qty: 30 | Refills: 0 | Status: DISCONTINUED | COMMUNITY
Start: 2019-05-01 | End: 2019-08-29

## 2019-09-30 ENCOUNTER — APPOINTMENT (OUTPATIENT)
Dept: FAMILY MEDICINE | Facility: CLINIC | Age: 69
End: 2019-09-30
Payer: MEDICARE

## 2019-09-30 VITALS
TEMPERATURE: 98.3 F | HEIGHT: 63 IN | WEIGHT: 207 LBS | OXYGEN SATURATION: 96 % | HEART RATE: 72 BPM | SYSTOLIC BLOOD PRESSURE: 120 MMHG | DIASTOLIC BLOOD PRESSURE: 70 MMHG | BODY MASS INDEX: 36.68 KG/M2

## 2019-09-30 DIAGNOSIS — Z86.718 PERSONAL HISTORY OF OTHER VENOUS THROMBOSIS AND EMBOLISM: ICD-10-CM

## 2019-09-30 PROCEDURE — 99214 OFFICE O/P EST MOD 30 MIN: CPT

## 2019-09-30 NOTE — HISTORY OF PRESENT ILLNESS
[FreeTextEntry8] : 68 year old female is here for a followup visit. Patient is here for medication renewals and for blood work discussion. Medications and allergies were reviewed and assessed.  There has been no new medications since the last visit. \par \par has multple complaints\par back pain\par allergies\par anxiety\par \par  had sleep study. mild sleep apnea. feeling better with the cpap machine\par  Patients active medications, allergies and issues were all reviewed with the patient at time of visit.\par \par tried cymbalta, did not like it\par has positive results with wellbutrin\par bad mood - doesn’t get along with sister and her rent was increased

## 2019-09-30 NOTE — ASSESSMENT
[FreeTextEntry1] : DEPRESSION/ANXIETY\par Discussed diagnosis of anxiety and depression with the patient and potential outcomes/side affects of treatment versus non treatment. Medications were assessed and described at length. Side affects and black box warning were discussed.  Patient was advised to continue will all medications prescribed and the need for compliance was discussed and emphasized. Patient was advised to not stop medications without discussing with a health care provider first. Patient was advised to continue psychotherapy or seek therapy if not currently attending. Patient was educated on addictive potential of controlled substances and was counseled to use only as needed and sparingly. Patient verbalized understanding of all the above.\par doing okay with wellbutrin, feeling much better better\par wants to do things\par no more "blah" - more up than down\par however sister is giving her a lot of stress\par \par ALLERGIES\par singulair works a little, continue\par add advair\par \par HTN\par The patient has a diagnosis of hypertension. Blood work was drawn in office and will be followed.  The diagnosis was discussed with patient and need for medication compliance and possible side affects and risks of noncompliance. Patient was told to adhere to a low salt diet and try to incorporate exercise daily.\par \par BACK PAIN & LEFT KNEE PAIN\par will try mobic\par want to stay away from controlled substances\par

## 2019-09-30 NOTE — REVIEW OF SYSTEMS
[Insomnia] : insomnia [Anxiety] : anxiety [Heartburn] : heartburn [Depression] : depression [Negative] : Neurological

## 2019-09-30 NOTE — HEALTH RISK ASSESSMENT
[No falls in past year] : Patient reported no falls in the past year [] : No [2] : 1) Little interest or pleasure doing things for more than half of the days (2) [WGF7Rhzjw] : 4 [WPB8Tdsab] : 11 [Good] : ~his/her~ current health as good [Patient declined mammogram] : Patient declined mammogram [Patient declined colonoscopy] : Patient declined colonoscopy [Patient declined PAP Smear] : Patient declined PAP Smear [Hepatitis C test offered] : Hepatitis C test offered [HIV Test offered] : HIV Test offered [With Family] : lives with family [# Of Children ___] : has [unfilled] children [Fully functional (bathing, dressing, toileting, transferring, walking, feeding)] : Fully functional (bathing, dressing, toileting, transferring, walking, feeding) [Smoke Detector] : smoke detector [Fully functional (using the telephone, shopping, preparing meals, housekeeping, doing laundry, using] : Fully functional and needs no help or supervision to perform IADLs (using the telephone, shopping, preparing meals, housekeeping, doing laundry, using transportation, managing medications and managing finances) [Seat Belt] :  uses seat belt [de-identified] : lives with sister [Discussed at today's visit] : Advance Directives Discussed at today's visit

## 2019-09-30 NOTE — PHYSICAL EXAM
[Well Nourished] : well nourished [PERRL] : pupils equal round and reactive to light [Well Developed] : well developed [No Lymphadenopathy] : no lymphadenopathy [Regular Rhythm] : with a regular rhythm [Clear to Auscultation] : lungs were clear to auscultation bilaterally [Normal S1, S2] : normal S1 and S2 [Non Tender] : non-tender [Soft] : abdomen soft [Normal Posterior Cervical Nodes] : no posterior cervical lymphadenopathy [Normal Anterior Cervical Nodes] : no anterior cervical lymphadenopathy [No Spinal Tenderness] : no spinal tenderness [No CVA Tenderness] : no CVA  tenderness [No Rash] : no rash [Normal Gait] : normal gait [Coordination Grossly Intact] : coordination grossly intact [Normal Affect] : the affect was normal [Normal Insight/Judgement] : insight and judgment were intact

## 2019-11-04 ENCOUNTER — MEDICATION RENEWAL (OUTPATIENT)
Age: 69
End: 2019-11-04

## 2019-11-26 ENCOUNTER — MEDICATION RENEWAL (OUTPATIENT)
Age: 69
End: 2019-11-26

## 2020-01-13 ENCOUNTER — RX RENEWAL (OUTPATIENT)
Age: 70
End: 2020-01-13

## 2020-01-26 ENCOUNTER — APPOINTMENT (OUTPATIENT)
Dept: FAMILY MEDICINE | Facility: CLINIC | Age: 70
End: 2020-01-26
Payer: MEDICARE

## 2020-01-26 VITALS — DIASTOLIC BLOOD PRESSURE: 72 MMHG | SYSTOLIC BLOOD PRESSURE: 120 MMHG

## 2020-01-26 PROCEDURE — 99214 OFFICE O/P EST MOD 30 MIN: CPT | Mod: 25

## 2020-01-26 PROCEDURE — 36415 COLL VENOUS BLD VENIPUNCTURE: CPT

## 2020-01-27 LAB
ALBUMIN SERPL ELPH-MCNC: 4.2 G/DL
ALP BLD-CCNC: 97 U/L
ALT SERPL-CCNC: 19 U/L
ANION GAP SERPL CALC-SCNC: 12 MMOL/L
AST SERPL-CCNC: 19 U/L
BILIRUB SERPL-MCNC: 0.2 MG/DL
BUN SERPL-MCNC: 19 MG/DL
CALCIUM SERPL-MCNC: 9.5 MG/DL
CHLORIDE SERPL-SCNC: 105 MMOL/L
CO2 SERPL-SCNC: 26 MMOL/L
CREAT SERPL-MCNC: 0.94 MG/DL
GLUCOSE SERPL-MCNC: 76 MG/DL
POTASSIUM SERPL-SCNC: 4.5 MMOL/L
PROT SERPL-MCNC: 5.9 G/DL
SODIUM SERPL-SCNC: 142 MMOL/L

## 2020-03-03 ENCOUNTER — APPOINTMENT (OUTPATIENT)
Dept: FAMILY MEDICINE | Facility: CLINIC | Age: 70
End: 2020-03-03

## 2020-03-18 ENCOUNTER — APPOINTMENT (OUTPATIENT)
Dept: FAMILY MEDICINE | Facility: CLINIC | Age: 70
End: 2020-03-18

## 2020-04-15 RX ORDER — MONTELUKAST 10 MG/1
10 TABLET, FILM COATED ORAL
Qty: 1 | Refills: 1 | Status: DISCONTINUED | COMMUNITY
Start: 2018-06-25 | End: 2020-04-15

## 2020-04-27 ENCOUNTER — APPOINTMENT (OUTPATIENT)
Dept: FAMILY MEDICINE | Facility: CLINIC | Age: 70
End: 2020-04-27

## 2020-04-28 ENCOUNTER — APPOINTMENT (OUTPATIENT)
Dept: FAMILY MEDICINE | Facility: CLINIC | Age: 70
End: 2020-04-28
Payer: MEDICARE

## 2020-04-28 VITALS
OXYGEN SATURATION: 96 % | HEIGHT: 63 IN | SYSTOLIC BLOOD PRESSURE: 158 MMHG | HEART RATE: 71 BPM | BODY MASS INDEX: 33.66 KG/M2 | WEIGHT: 190 LBS | DIASTOLIC BLOOD PRESSURE: 88 MMHG

## 2020-04-28 DIAGNOSIS — Z88.9 ALLERGY STATUS TO UNSPECIFIED DRUGS, MEDICAMENTS AND BIOLOGICAL SUBSTANCES: ICD-10-CM

## 2020-04-28 PROCEDURE — 99215 OFFICE O/P EST HI 40 MIN: CPT

## 2020-04-28 RX ORDER — MIRTAZAPINE 30 MG/1
30 TABLET, FILM COATED ORAL
Qty: 90 | Refills: 0 | Status: DISCONTINUED | COMMUNITY
Start: 2020-03-18 | End: 2020-04-28

## 2020-04-28 NOTE — HEALTH RISK ASSESSMENT
[No falls in past year] : Patient reported no falls in the past year [2] : 1) Little interest or pleasure doing things for more than half of the days (2) [Good] : ~his/her~  mood as  good [Patient declined mammogram] : Patient declined mammogram [Patient declined colonoscopy] : Patient declined colonoscopy [Patient declined PAP Smear] : Patient declined PAP Smear [HIV Test offered] : HIV Test offered [# Of Children ___] : has [unfilled] children [Hepatitis C test offered] : Hepatitis C test offered [With Family] : lives with family [Fully functional (using the telephone, shopping, preparing meals, housekeeping, doing laundry, using] : Fully functional and needs no help or supervision to perform IADLs (using the telephone, shopping, preparing meals, housekeeping, doing laundry, using transportation, managing medications and managing finances) [Fully functional (bathing, dressing, toileting, transferring, walking, feeding)] : Fully functional (bathing, dressing, toileting, transferring, walking, feeding) [Smoke Detector] : smoke detector [Seat Belt] :  uses seat belt [Discussed at today's visit] : Advance Directives Discussed at today's visit [] : No [[id number]] : 4 [[id number]] : 11 [de-identified] : lives with sister

## 2020-04-28 NOTE — REVIEW OF SYSTEMS
[Heartburn] : heartburn [Insomnia] : insomnia [Depression] : depression [Anxiety] : anxiety [Negative] : Heme/Lymph

## 2020-04-28 NOTE — ASSESSMENT
[FreeTextEntry1] : DEPRESSION/ANXIETY\par Discussed diagnosis of anxiety and depression with the patient and potential outcomes/side affects of treatment versus non treatment. Medications were assessed and described at length. Side affects and black box warning were discussed.  Patient was advised to continue will all medications prescribed and the need for compliance was discussed and emphasized. Patient was advised to not stop medications without discussing with a health care provider first. Patient was advised to continue psychotherapy or seek therapy if not currently attending. Patient was educated on addictive potential of controlled substances and was counseled to use only as needed and sparingly. Patient verbalized understanding of all the above.\par doing okay with wellbutrin, feeling much better better\par wants to do things\par no more "blah" - more up than down\par however sister is giving her a lot of stress, which is chronic\par can use half klonipin as needed\par \par INSOMNIA\par chronic, now in acute flare\par failed buspar, ambien, benadryl, remeron\par will try klonipin 1-2 prior to bedtime\par patient has been self administering medications, advised she must stop\par do not combine\par \par ALLERGIES\par singulair works a little, continue\par added advair, but could not afford, gave other options\par \par HTN\par The patient has a diagnosis of hypertension. Blood work was drawn in office and will be followed.  The diagnosis was discussed with patient and need for medication compliance and possible side affects and risks of noncompliance. Patient was told to adhere to a low salt diet and try to incorporate exercise daily.\par \par BACK PAIN & LEFT KNEE PAIN\par will try mobic\par want to stay away from controlled substances\par

## 2020-04-28 NOTE — PHYSICAL EXAM
[No Acute Distress] : no acute distress [Well Developed] : well developed [Well Nourished] : well nourished [Well-Appearing] : well-appearing [No Respiratory Distress] : no respiratory distress  [No Lymphadenopathy] : no lymphadenopathy [Clear to Auscultation] : lungs were clear to auscultation bilaterally [Regular Rhythm] : with a regular rhythm [Normal S1, S2] : normal S1 and S2 [Normal Posterior Cervical Nodes] : no posterior cervical lymphadenopathy [Normal Anterior Cervical Nodes] : no anterior cervical lymphadenopathy [No CVA Tenderness] : no CVA  tenderness [No Spinal Tenderness] : no spinal tenderness [No Rash] : no rash [Normal Gait] : normal gait [Coordination Grossly Intact] : coordination grossly intact [Normal Mood] : the mood was normal [Normal Affect] : the affect was normal [Normal Insight/Judgement] : insight and judgment were intact

## 2020-05-22 ENCOUNTER — APPOINTMENT (OUTPATIENT)
Dept: FAMILY MEDICINE | Facility: CLINIC | Age: 70
End: 2020-05-22
Payer: MEDICARE

## 2020-05-22 VITALS
HEART RATE: 63 BPM | DIASTOLIC BLOOD PRESSURE: 80 MMHG | OXYGEN SATURATION: 98 % | HEIGHT: 63 IN | SYSTOLIC BLOOD PRESSURE: 140 MMHG

## 2020-05-22 PROCEDURE — 99215 OFFICE O/P EST HI 40 MIN: CPT | Mod: 25

## 2020-05-22 PROCEDURE — G0444 DEPRESSION SCREEN ANNUAL: CPT | Mod: 59

## 2020-05-22 RX ORDER — LORAZEPAM 0.5 MG/1
0.5 TABLET ORAL
Qty: 60 | Refills: 0 | Status: DISCONTINUED | COMMUNITY
Start: 2020-04-28 | End: 2020-05-22

## 2020-05-22 RX ORDER — TRAMADOL HYDROCHLORIDE 50 MG/1
50 TABLET, COATED ORAL
Qty: 30 | Refills: 0 | Status: DISCONTINUED | COMMUNITY
Start: 2020-01-26 | End: 2020-05-22

## 2020-05-22 RX ORDER — CLONAZEPAM 1 MG/1
1 TABLET ORAL
Qty: 45 | Refills: 0 | Status: DISCONTINUED | COMMUNITY
Start: 2020-04-29 | End: 2020-05-22

## 2020-05-22 NOTE — ASSESSMENT
[FreeTextEntry1] : DEPRESSION/ANXIETY\par Discussed diagnosis of anxiety and depression with the patient and potential outcomes/side affects of treatment versus non treatment. Medications were assessed and described at length. Side affects and black box warning were discussed.  Patient was advised to continue will all medications prescribed and the need for compliance was discussed and emphasized. Patient was advised to not stop medications without discussing with a health care provider first. Patient was advised to continue psychotherapy or seek therapy if not currently attending. Patient was educated on addictive potential of controlled substances and was counseled to use only as needed and sparingly. Patient verbalized understanding of all the above.\par \par due to covid and her situation living with her sister who is very difficult, financial issues, patient cannot sleep, is always unhappy, stressed, panic attacks, long family hisory of psych and mood disorders\par prior to the current situation, patient has long standing history of depression, anxiety and insomnia, this is severe acute on chronic issues\par reinforced patient must see psychotherapist for counseling\par \par sister is giving her a lot of stress, which is acute on chronic\par realizes that she does not like klonopin, worked in the beginning, did not like how it made her feel\par at this point, will continue wellbutrin, which was helping, and will increase buspar to 30 bid\par spent time discussing that some of her feelings are situational due to covid, having no money, no where to go and stuck with her sister, lost 10 friends to covid\par \par INSOMNIA\par chronic, now in acute flare\par failed, ambien, benadryl, remeron\par tried klonipin 1-2 prior to bedtime, however did not like it due to morning after, worked for a bit, but now is not working\par will try seroquel\par \par must fu in 1 week\par patient has been self administering medications, advised she must stop\par do not combine\par \par ALLERGIES\par singulair works a little, continue\par added advair, but could not afford, gave other options\par \par HTN\par The patient has a diagnosis of hypertension..  The diagnosis was discussed with patient and need for medication compliance and possible side affects and risks of noncompliance. Patient was told to adhere to a low salt diet and try to incorporate exercise daily.\par monitor\par \par BACK PAIN & LEFT KNEE PAIN\par will try mobic\par want to stay away from controlled substances\par

## 2020-05-22 NOTE — HISTORY OF PRESENT ILLNESS
[FreeTextEntry8] : 68 year old female is here for a followup visit. Patient is here for medication renewals and for blood work discussion. Medications and allergies were reviewed and assessed.  There has been no new medications since the last visit. \par \par has multple complaints\par back pain\par allergies\par anxiety - having several meltdowns, panic attacks, not doing well\par \par  had sleep study. mild sleep apnea. feeling better with the cpap machine\par  Patients active medications, allergies and issues were all reviewed with the patient at time of visit.\par \par tried cymbalta, did not like it\par has positive results with wellbutrin\par bad mood - doesn’t get along with sister and her rent was increased

## 2020-05-22 NOTE — HEALTH RISK ASSESSMENT
[] : No [No falls in past year] : Patient reported no falls in the past year [2] : 2) Feeling down, depressed, or hopeless for more than half of the days (2) [[id number]] : 4 [[id number]] : 11 [Good] : ~his/her~  mood as  good [Patient declined mammogram] : Patient declined mammogram [Patient declined PAP Smear] : Patient declined PAP Smear [Patient declined colonoscopy] : Patient declined colonoscopy [HIV Test offered] : HIV Test offered [Hepatitis C test offered] : Hepatitis C test offered [With Family] : lives with family [# Of Children ___] : has [unfilled] children [Fully functional (bathing, dressing, toileting, transferring, walking, feeding)] : Fully functional (bathing, dressing, toileting, transferring, walking, feeding) [Fully functional (using the telephone, shopping, preparing meals, housekeeping, doing laundry, using] : Fully functional and needs no help or supervision to perform IADLs (using the telephone, shopping, preparing meals, housekeeping, doing laundry, using transportation, managing medications and managing finances) [Smoke Detector] : smoke detector [Seat Belt] :  uses seat belt [de-identified] : lives with sister [Discussed at today's visit] : Advance Directives Discussed at today's visit

## 2020-05-22 NOTE — PHYSICAL EXAM
[No Acute Distress] : no acute distress [Well Developed] : well developed [Well Nourished] : well nourished [Well-Appearing] : well-appearing [No Lymphadenopathy] : no lymphadenopathy [Clear to Auscultation] : lungs were clear to auscultation bilaterally [Regular Rhythm] : with a regular rhythm [Normal S1, S2] : normal S1 and S2 [Normal Posterior Cervical Nodes] : no posterior cervical lymphadenopathy [No Rash] : no rash [Normal Gait] : normal gait [Coordination Grossly Intact] : coordination grossly intact [Normal Affect] : the affect was normal [Normal Mood] : the mood was normal [Normal Insight/Judgement] : insight and judgment were intact

## 2020-05-22 NOTE — REVIEW OF SYSTEMS
[Heartburn] : heartburn [Insomnia] : insomnia [Anxiety] : anxiety [Depression] : depression [Negative] : Heme/Lymph [de-identified] : severe

## 2020-05-27 ENCOUNTER — APPOINTMENT (OUTPATIENT)
Dept: FAMILY MEDICINE | Facility: CLINIC | Age: 70
End: 2020-05-27
Payer: MEDICARE

## 2020-05-27 VITALS
DIASTOLIC BLOOD PRESSURE: 82 MMHG | BODY MASS INDEX: 34.21 KG/M2 | SYSTOLIC BLOOD PRESSURE: 160 MMHG | WEIGHT: 193.13 LBS

## 2020-05-27 PROCEDURE — 99215 OFFICE O/P EST HI 40 MIN: CPT

## 2020-05-27 NOTE — PHYSICAL EXAM
[No Acute Distress] : no acute distress [Well Nourished] : well nourished [Well Developed] : well developed [Well-Appearing] : well-appearing [No Lymphadenopathy] : no lymphadenopathy [Clear to Auscultation] : lungs were clear to auscultation bilaterally [Regular Rhythm] : with a regular rhythm [Normal S1, S2] : normal S1 and S2 [Normal Posterior Cervical Nodes] : no posterior cervical lymphadenopathy [No Rash] : no rash [Normal Gait] : normal gait [Coordination Grossly Intact] : coordination grossly intact [Normal Affect] : the affect was normal [Normal Mood] : the mood was normal [Normal Insight/Judgement] : insight and judgment were intact

## 2020-05-27 NOTE — HISTORY OF PRESENT ILLNESS
[FreeTextEntry8] : 69 year old female is here for a followup visit. Patient is here for medication renewals and for blood work discussion. Medications and allergies were reviewed and assessed.  There has been no new medications since the last visit. \par \par has multple complaints\par back pain\par allergies\par anxiety - having several meltdowns, panic attacks, not doing well\par \par  had sleep study. mild sleep apnea. feeling better with the cpap machine\par  Patients active medications, allergies and issues were all reviewed with the patient at time of visit.\par \par tried cymbalta, did not like it\par has positive results with wellbutrin\par bad mood - doesn’t get along with sister and her rent was increased

## 2020-05-27 NOTE — HEALTH RISK ASSESSMENT
[] : No [No falls in past year] : Patient reported no falls in the past year [2] : 2) Feeling down, depressed, or hopeless for more than half of the days (2) [[id number]] : 4 [[id number]] : 11 [Good] : ~his/her~  mood as  good [Patient declined mammogram] : Patient declined mammogram [Patient declined PAP Smear] : Patient declined PAP Smear [Patient declined colonoscopy] : Patient declined colonoscopy [HIV Test offered] : HIV Test offered [Hepatitis C test offered] : Hepatitis C test offered [With Family] : lives with family [# Of Children ___] : has [unfilled] children [Fully functional (bathing, dressing, toileting, transferring, walking, feeding)] : Fully functional (bathing, dressing, toileting, transferring, walking, feeding) [Fully functional (using the telephone, shopping, preparing meals, housekeeping, doing laundry, using] : Fully functional and needs no help or supervision to perform IADLs (using the telephone, shopping, preparing meals, housekeeping, doing laundry, using transportation, managing medications and managing finances) [Smoke Detector] : smoke detector [Seat Belt] :  uses seat belt [de-identified] : lives with sister [Discussed at today's visit] : Advance Directives Discussed at today's visit

## 2020-05-27 NOTE — ASSESSMENT
[FreeTextEntry1] : DEPRESSION/ANXIETY\par Discussed diagnosis of anxiety and depression with the patient and potential outcomes/side affects of treatment versus non treatment. Medications were assessed and described at length. Side affects and black box warning were discussed.  Patient was advised to continue will all medications prescribed and the need for compliance was discussed and emphasized. Patient was advised to not stop medications without discussing with a health care provider first. Patient was advised to continue psychotherapy or seek therapy if not currently attending. Patient was educated on addictive potential of controlled substances and was counseled to use only as needed and sparingly. Patient verbalized understanding of all the above.\par \par due to covid and her situation living with her sister who is very difficult, financial issues, patient cannot sleep, is always unhappy, stressed, panic attacks, long family history of psych and mood disorders\par prior to the current situation, patient has long standing history of depression, anxiety and insomnia, this is severe acute on chronic issues\par reinforced patient must see psychotherapist for counseling - will call and make an appointment with the number I provided\par \par realizes that she does not like klonopin, worked in the beginning, did not like how it made her feel\par at this point, will continue wellbutrin, which was helping, and increased buspar to 30 bid, which has helped, decreased anxiety, only has one panic attack, even breathing better\par still crying, will not change at this point and reassess as some of this is very situational\par \par spent time discussing that some of her feelings are situational due to covid, having no money, no where to go and stuck with her sister, lost 10 friends to covid\par \par INSOMNIA\par chronic, now in acute flare\par failed, ambien, benadryl, remeron\par tried klonipin 1-2 prior to bedtime, however did not like it due to morning after, worked for a bit, but now is not working\par tried seroquel, now can sleep 10-2, but can fall back asleep, somewhat grouchy in the morning, but it passes\par \par ALLERGIES\par singulair works a little, continue\par added advair, but could not afford, gave other options\par \par HTN\par The patient has a diagnosis of hypertension.  The diagnosis was discussed with patient and need for medication compliance and possible side affects and risks of noncompliance. Patient was told to adhere to a low salt diet and try to incorporate exercise daily.\par monitor\par \par BACK PAIN & LEFT KNEE PAIN\par will try mobic\par want to stay away from controlled substances\par

## 2020-05-27 NOTE — REVIEW OF SYSTEMS
[Heartburn] : heartburn [Insomnia] : insomnia [Anxiety] : anxiety [Depression] : depression [Negative] : Heme/Lymph [de-identified] : severe

## 2020-06-10 ENCOUNTER — APPOINTMENT (OUTPATIENT)
Dept: FAMILY MEDICINE | Facility: CLINIC | Age: 70
End: 2020-06-10
Payer: MEDICARE

## 2020-06-10 VITALS
WEIGHT: 196 LBS | HEART RATE: 76 BPM | BODY MASS INDEX: 34.73 KG/M2 | OXYGEN SATURATION: 98 % | SYSTOLIC BLOOD PRESSURE: 154 MMHG | HEIGHT: 63 IN | DIASTOLIC BLOOD PRESSURE: 86 MMHG

## 2020-06-10 PROCEDURE — 99214 OFFICE O/P EST MOD 30 MIN: CPT

## 2020-06-10 NOTE — REVIEW OF SYSTEMS
[Heartburn] : heartburn [Insomnia] : insomnia [Anxiety] : anxiety [Depression] : depression [Negative] : Heme/Lymph [de-identified] : severe

## 2020-06-10 NOTE — HEALTH RISK ASSESSMENT
[] : No [No falls in past year] : Patient reported no falls in the past year [2] : 2) Feeling down, depressed, or hopeless for more than half of the days (2) [[id number]] : 4 [[id number]] : 11 [Good] : ~his/her~  mood as  good [Patient declined mammogram] : Patient declined mammogram [Patient declined PAP Smear] : Patient declined PAP Smear [Patient declined colonoscopy] : Patient declined colonoscopy [HIV Test offered] : HIV Test offered [Hepatitis C test offered] : Hepatitis C test offered [With Family] : lives with family [# Of Children ___] : has [unfilled] children [Fully functional (bathing, dressing, toileting, transferring, walking, feeding)] : Fully functional (bathing, dressing, toileting, transferring, walking, feeding) [Fully functional (using the telephone, shopping, preparing meals, housekeeping, doing laundry, using] : Fully functional and needs no help or supervision to perform IADLs (using the telephone, shopping, preparing meals, housekeeping, doing laundry, using transportation, managing medications and managing finances) [Smoke Detector] : smoke detector [Seat Belt] :  uses seat belt [de-identified] : lives with sister [Discussed at today's visit] : Advance Directives Discussed at today's visit

## 2020-06-10 NOTE — HISTORY OF PRESENT ILLNESS
[FreeTextEntry8] : 69 year old female is here for a followup visit. Patient is here for medication renewals and for blood work discussion. Medications and allergies were reviewed and assessed.  There has been no new medications since the last visit. \par \par has multple complaints\par back pain\par allergies\par anxiety - having several meltdowns, panic attacks, not doing well\par \par  had sleep study. mild sleep apnea. feeling better with the cpap machine\par  Patients active medications, allergies and issues were all reviewed with the patient at time of visit.\par \par tried cymbalta, did not like it\par has positive results with wellbutrin however\par bad mood - doesn’t get along with sister and her rent was increased

## 2020-06-10 NOTE — ASSESSMENT
[FreeTextEntry1] : DEPRESSION/ANXIETY\par Discussed diagnosis of anxiety and depression with the patient and potential outcomes/side affects of treatment versus non treatment. Medications were assessed and described at length. Side affects and black box warning were discussed.  Patient was advised to continue will all medications prescribed and the need for compliance was discussed and emphasized. Patient was advised to not stop medications without discussing with a health care provider first. Patient was advised to continue psychotherapy or seek therapy if not currently attending. Patient was educated on addictive potential of controlled substances and was counseled to use only as needed and sparingly. Patient verbalized understanding of all the above.\par \par due to covid and her situation living with her sister who is very difficult, financial issues, patient cannot sleep, is always unhappy, stressed, panic attacks, long family history of psych and mood disorders\par prior to the current situation, patient has long standing history of depression, anxiety and insomnia, this is severe acute on chronic issues\par reinforced patient must see psychotherapist for counseling - will call and make an appointment with the number I provided\par \par realizes that she does not like klonopin, worked in the beginning, did not like how it made her feel\par at this point, will continue wellbutrin, which was helping, and increased buspar to 30 bid, which has helped, decreased anxiety, only has one panic attack, even breathing better\par \par major issues are depression and insomnia: \par spent time discussing that some of her feelings are situational due to covid, having no money, no where to go and stuck with her sister, lost 10 friends to covid\par \par INSOMNIA\par chronic, now in acute flare\par failed, ambien, benadryl, remeron\par tried klonipin 1-2 prior to bedtime, however did not like it due to morning after, worked for a bit, but now is not working\par tried seroquel, now can sleep 10-2, but can fall back asleep, however stopped because she read that it gained weight\par wishes to try ambien CR, will try\par \par ALLERGIES\par singulair works a little, continue\par added advair, but could not afford, gave other options\par \par HTN\par The patient has a diagnosis of hypertension.  The diagnosis was discussed with patient and need for medication compliance and possible side affects and risks of noncompliance. Patient was told to adhere to a low salt diet and try to incorporate exercise daily.\par monitor\par \par BACK PAIN & LEFT KNEE PAIN\par will try mobic\par want to stay away from controlled substances\par

## 2020-06-22 ENCOUNTER — LABORATORY RESULT (OUTPATIENT)
Age: 70
End: 2020-06-22

## 2020-06-22 ENCOUNTER — NON-APPOINTMENT (OUTPATIENT)
Age: 70
End: 2020-06-22

## 2020-06-22 ENCOUNTER — APPOINTMENT (OUTPATIENT)
Dept: FAMILY MEDICINE | Facility: CLINIC | Age: 70
End: 2020-06-22
Payer: MEDICARE

## 2020-06-22 VITALS
HEART RATE: 56 BPM | SYSTOLIC BLOOD PRESSURE: 130 MMHG | BODY MASS INDEX: 34.62 KG/M2 | OXYGEN SATURATION: 99 % | HEIGHT: 63 IN | TEMPERATURE: 98.3 F | DIASTOLIC BLOOD PRESSURE: 70 MMHG | WEIGHT: 195.38 LBS

## 2020-06-22 DIAGNOSIS — Z11.59 ENCOUNTER FOR SCREENING FOR OTHER VIRAL DISEASES: ICD-10-CM

## 2020-06-22 LAB
APPEARANCE: CLEAR
BASOPHILS # BLD AUTO: 0.04 K/UL
BASOPHILS NFR BLD AUTO: 0.8 %
BILIRUBIN URINE: NEGATIVE
BLOOD URINE: NEGATIVE
CHOLEST SERPL-MCNC: 189 MG/DL
CHOLEST/HDLC SERPL: 2.2 RATIO
COLOR: NORMAL
CYTOLOGY CVX/VAG DOC THIN PREP: NORMAL
EOSINOPHIL # BLD AUTO: 0.16 K/UL
EOSINOPHIL NFR BLD AUTO: 3.1 %
ESTIMATED AVERAGE GLUCOSE: 105 MG/DL
FERRITIN SERPL-MCNC: 25 NG/ML
FOLATE SERPL-MCNC: >20 NG/ML
GLUCOSE QUALITATIVE U: NEGATIVE
HBA1C MFR BLD HPLC: 5.3 %
HCT VFR BLD CALC: 39.4 %
HDLC SERPL-MCNC: 86 MG/DL
HGB BLD-MCNC: 12.2 G/DL
IMM GRANULOCYTES NFR BLD AUTO: 0.2 %
IRON SATN MFR SERPL: 16 %
IRON SERPL-MCNC: 53 UG/DL
KETONES URINE: NEGATIVE
LDLC SERPL CALC-MCNC: 91 MG/DL
LEUKOCYTE ESTERASE URINE: ABNORMAL
LYMPHOCYTES # BLD AUTO: 0.87 K/UL
LYMPHOCYTES NFR BLD AUTO: 17.1 %
MAN DIFF?: NORMAL
MCHC RBC-ENTMCNC: 27.3 PG
MCHC RBC-ENTMCNC: 31 GM/DL
MCV RBC AUTO: 88.1 FL
MONOCYTES # BLD AUTO: 0.36 K/UL
MONOCYTES NFR BLD AUTO: 7.1 %
NEUTROPHILS # BLD AUTO: 3.65 K/UL
NEUTROPHILS NFR BLD AUTO: 71.7 %
NITRITE URINE: NEGATIVE
PH URINE: 6.5
PLATELET # BLD AUTO: 255 K/UL
PROTEIN URINE: NEGATIVE
RBC # BLD: 4.47 M/UL
RBC # FLD: 14 %
SARS-COV-2 IGG SERPL IA-ACNC: <3.8 AU/ML
SARS-COV-2 IGG SERPL QL IA: NEGATIVE
SPECIFIC GRAVITY URINE: 1
TIBC SERPL-MCNC: 335 UG/DL
TRIGL SERPL-MCNC: 53 MG/DL
TSH SERPL-ACNC: 2.36 UIU/ML
UIBC SERPL-MCNC: 281 UG/DL
UROBILINOGEN URINE: NORMAL
VIT B12 SERPL-MCNC: >2000 PG/ML
WBC # FLD AUTO: 5.09 K/UL

## 2020-06-22 PROCEDURE — 36415 COLL VENOUS BLD VENIPUNCTURE: CPT

## 2020-06-22 PROCEDURE — 93000 ELECTROCARDIOGRAM COMPLETE: CPT | Mod: 59

## 2020-06-22 PROCEDURE — G0442 ANNUAL ALCOHOL SCREEN 15 MIN: CPT | Mod: 59

## 2020-06-22 PROCEDURE — G0439: CPT

## 2020-06-22 PROCEDURE — 99497 ADVNCD CARE PLAN 30 MIN: CPT

## 2020-06-22 NOTE — PHYSICAL EXAM
[Well Nourished] : well nourished [No Acute Distress] : no acute distress [Well Developed] : well developed [Well-Appearing] : well-appearing [No Lymphadenopathy] : no lymphadenopathy [Clear to Auscultation] : lungs were clear to auscultation bilaterally [Normal S1, S2] : normal S1 and S2 [Regular Rhythm] : with a regular rhythm [Normal Posterior Cervical Nodes] : no posterior cervical lymphadenopathy [No Rash] : no rash [Coordination Grossly Intact] : coordination grossly intact [Normal Affect] : the affect was normal [Normal Mood] : the mood was normal [Normal Insight/Judgement] : insight and judgment were intact

## 2020-06-22 NOTE — ASSESSMENT
[FreeTextEntry1] : check for covid antibodies\par \par reports being anemic, will check\par \par abnormal ekg\par reviewed previous\par order cardiology consult\par \par Patient was counseled on healthy eating habits, on daily exercise and stress relief. All medications and allergies were reviewed with the patient and any adjustments necessary were made. Patient was counseled to try engage in an exercise activity for at least 30 minutes 3-4 times a week.  Patient was advised to eat a diet low in fat and carbohydrates and high in protein, with plenty of vegetables. Patient was advised to try and engage in relaxing activities whenever possible.\par The patients blood was draw in office and will be followed and assessed for any issues.  Patient was told to return to the office if any issues arise.  Unless otherwise stated, the patient is to continue all other medications as previously prescribed.\par \par DEPRESSION/ANXIETY\par Discussed diagnosis of anxiety and depression with the patient and potential outcomes/side affects of treatment versus non treatment. Medications were assessed and described at length. Side affects and black box warning were discussed.  Patient was advised to continue will all medications prescribed and the need for compliance was discussed and emphasized. Patient was advised to not stop medications without discussing with a health care provider first. Patient was advised to continue psychotherapy or seek therapy if not currently attending. Patient was educated on addictive potential of controlled substances and was counseled to use only as needed and sparingly. Patient verbalized understanding of all the above.\par \par due to covid and her situation living with her sister who is very difficult, financial issues, patient cannot sleep, is always unhappy, stressed, panic attacks, long family history of psych and mood disorders\par prior to the current situation, patient has long standing history of depression, anxiety and insomnia, this is severe acute on chronic issues\par reinforced patient must see psychotherapist for counseling - will call and make an appointment with the number I provided\par \par realizes that she does not like klonopin, worked in the beginning, did not like how it made her feel\par at this point, will continue wellbutrin, which was helping, and increased buspar to 30 bid, which has helped, decreased anxiety, only has one panic attack, even breathing better\par \par major issues are depression and insomnia: \par spent time discussing that some of her feelings are situational due to covid, having no money, no where to go and stuck with her sister, lost 10 friends to covid\par \par INSOMNIA\par chronic, now in acute flare\par failed, ambien, benadryl, remeron\par tried klonipin 1-2 prior to bedtime, however did not like it due to morning after, worked for a bit, but now is not working\par tried seroquel, now can sleep 10-2, but can fall back asleep, however stopped because she read that it gained weight\par wishes to try ambien CR, will try\par \par ALLERGIES\par singulair works a little, continue\par added advair, but could not afford, gave other options\par \par HTN\par The patient has a diagnosis of hypertension.  The diagnosis was discussed with patient and need for medication compliance and possible side affects and risks of noncompliance. Patient was told to adhere to a low salt diet and try to incorporate exercise daily.\par monitor\par \par BACK PAIN & LEFT KNEE PAIN\par will try mobic\par want to stay away from controlled substances\par

## 2020-06-22 NOTE — HEALTH RISK ASSESSMENT
[Fair] :  ~his/her~ mood as fair [Very Good] : ~his/her~ current health as very good [] : No [No falls in past year] : Patient reported no falls in the past year [2] : 2) Feeling down, depressed, or hopeless for more than half of the days (2) [[id number]] : 4 [PCY9Qcshs] : 11 [Patient declined mammogram] : Patient declined mammogram [Patient declined colonoscopy] : Patient declined colonoscopy [Patient declined PAP Smear] : Patient declined PAP Smear [HIV Test offered] : HIV Test offered [Hepatitis C test offered] : Hepatitis C test offered [With Family] : lives with family [] :  [# Of Children ___] : has [unfilled] children [Fully functional (bathing, dressing, toileting, transferring, walking, feeding)] : Fully functional (bathing, dressing, toileting, transferring, walking, feeding) [Seat Belt] :  uses seat belt [Fully functional (using the telephone, shopping, preparing meals, housekeeping, doing laundry, using] : Fully functional and needs no help or supervision to perform IADLs (using the telephone, shopping, preparing meals, housekeeping, doing laundry, using transportation, managing medications and managing finances) [Smoke Detector] : smoke detector [FreeTextEntry3] : passed 2004 [de-identified] : lives with sister [Designated Healthcare Proxy] : Designated healthcare proxy [With Patient/Caregiver] : With Patient/Caregiver [Name: ___] : Health Care Proxy's Name: [unfilled]  [Relationship: ___] : Relationship: [unfilled] [FreeTextEntry4] : daughter knows wishes [AdvancecareDate] : 06/22/2020

## 2020-06-22 NOTE — REVIEW OF SYSTEMS
[Heartburn] : heartburn [Anxiety] : anxiety [Insomnia] : insomnia [Depression] : depression [Negative] : Neurological [de-identified] : severe

## 2020-06-22 NOTE — HISTORY OF PRESENT ILLNESS
[FreeTextEntry8] : 69 year old female  here for annual well visit. Patient's blood work was drawn and medications reviewed. Patient's past medical history was reviewed, allergies verified and problems were identified and assessed. Patients medications were reviewed. Patient is feeling well with no new or active complaints at this time.\par \par has multple complaints\par back pain\par allergies\par anxiety - having several meltdowns, panic attacks, not doing well\par \par  had sleep study. mild sleep apnea. feeling better with the cpap machine\par  Patients active medications, allergies and issues were all reviewed with the patient at time of visit.\par \par tried cymbalta, did not like it\par has positive results with wellbutrin however\par bad mood - doesn’t get along with sister and her rent was increased

## 2020-07-07 ENCOUNTER — APPOINTMENT (OUTPATIENT)
Dept: FAMILY MEDICINE | Facility: CLINIC | Age: 70
End: 2020-07-07
Payer: MEDICARE

## 2020-07-07 VITALS
DIASTOLIC BLOOD PRESSURE: 88 MMHG | HEART RATE: 70 BPM | OXYGEN SATURATION: 97 % | WEIGHT: 195 LBS | BODY MASS INDEX: 34.55 KG/M2 | HEIGHT: 63 IN | TEMPERATURE: 98.6 F | SYSTOLIC BLOOD PRESSURE: 140 MMHG

## 2020-07-07 LAB
BILIRUB UR QL STRIP: NEGATIVE
GLUCOSE UR-MCNC: NEGATIVE
HCG UR QL: 0.2 EU/DL
HGB UR QL STRIP.AUTO: NORMAL
KETONES UR-MCNC: NEGATIVE
LEUKOCYTE ESTERASE UR QL STRIP: NORMAL
NITRITE UR QL STRIP: NEGATIVE
PH UR STRIP: 7
PROT UR STRIP-MCNC: NEGATIVE
SP GR UR STRIP: 1.01

## 2020-07-07 PROCEDURE — 99214 OFFICE O/P EST MOD 30 MIN: CPT | Mod: 25

## 2020-07-07 PROCEDURE — 81002 URINALYSIS NONAUTO W/O SCOPE: CPT

## 2020-07-07 PROCEDURE — G0296 VISIT TO DETERM LDCT ELIG: CPT

## 2020-07-07 NOTE — HEALTH RISK ASSESSMENT
[No falls in past year] : Patient reported no falls in the past year [2] : 2) Feeling down, depressed, or hopeless for more than half of the days (2) [Fair] :  ~his/her~ mood as fair [Very Good] : ~his/her~ current health as very good [Patient declined mammogram] : Patient declined mammogram [Patient declined colonoscopy] : Patient declined colonoscopy [HIV Test offered] : HIV Test offered [Patient declined PAP Smear] : Patient declined PAP Smear [Hepatitis C test offered] : Hepatitis C test offered [] :  [With Family] : lives with family [Fully functional (bathing, dressing, toileting, transferring, walking, feeding)] : Fully functional (bathing, dressing, toileting, transferring, walking, feeding) [# Of Children ___] : has [unfilled] children [Smoke Detector] : smoke detector [Fully functional (using the telephone, shopping, preparing meals, housekeeping, doing laundry, using] : Fully functional and needs no help or supervision to perform IADLs (using the telephone, shopping, preparing meals, housekeeping, doing laundry, using transportation, managing medications and managing finances) [Seat Belt] :  uses seat belt [With Patient/Caregiver] : With Patient/Caregiver [Designated Healthcare Proxy] : Designated healthcare proxy [Name: ___] : Health Care Proxy's Name: [unfilled]  [Relationship: ___] : Relationship: [unfilled] [] : No [VMT4Hbeax] : 4 [KIO0Eogtl] : 11 [de-identified] : lives with sister [FreeTextEntry3] : passed 2004 [AdvancecareDate] : 06/22/2020 [FreeTextEntry4] : daughter knows wishes

## 2020-07-07 NOTE — REVIEW OF SYSTEMS
[Insomnia] : insomnia [Heartburn] : heartburn [Anxiety] : anxiety [Depression] : depression [Negative] : Heme/Lymph [FreeTextEntry9] : knee pain [de-identified] : severe

## 2020-07-07 NOTE — ASSESSMENT
[FreeTextEntry1] : uti\par Patient was advised to take all medications as prescribed and to finish any antibiotics in their entirety. Patient was told to rest, hydrate and treat symptoms as necessary. Patient was advised to return to this office or go directly to the ER if symptoms do not improve or if any worsening occurs.\par never took cipro from previous 6/2020\par fu culture, start cipro\par \par Patient with long smoking history. Reviewed history and smoking cessation strategies. Discussed benefits of low dose CT scan for early detection of lung cancer.  Risks and benefits reviewed.  Some risks discussed were radiation exposure, false positives, incidental findings. Will order low dose CT of lungs for assessment.\par \par \par abnormal ekg\par reviewed previous\par order cardiology consult - has appointment with cardiology tomorrow\par \par DEPRESSION/ANXIETY\par Discussed diagnosis of anxiety and depression with the patient and potential outcomes/side affects of treatment versus non treatment. Medications were assessed and described at length. Side affects and black box warning were discussed.  Patient was advised to continue will all medications prescribed and the need for compliance was discussed and emphasized. Patient was advised to not stop medications without discussing with a health care provider first. Patient was advised to continue psychotherapy or seek therapy if not currently attending. Patient was educated on addictive potential of controlled substances and was counseled to use only as needed and sparingly. Patient verbalized understanding of all the above.\par \par due to covid and her situation living with her sister who is very difficult, financial issues, patient cannot sleep, is always unhappy, stressed, panic attacks, long family history of psych and mood disorders\par prior to the current situation, patient has long standing history of depression, anxiety and insomnia, this is severe acute on chronic issues\par reinforced patient must see psychotherapist for counseling - will call and make an appointment with the number I provided\par \par realizes that she does not like klonopin, worked in the beginning, did not like how it made her feel\par at this point, will continue wellbutrin, which was helping, and increased buspar to 30 bid, which has helped, decreased anxiety, only has one panic attack, even breathing better\par \par major issues are depression and insomnia: \par spent time discussing that some of her feelings are situational due to covid, having no money, no where to go and stuck with her sister, lost 10 friends to covid\par \par INSOMNIA\par chronic, now in acute flare\par failed, ambien, benadryl, remeron\par tried klonipin 1-2 prior to bedtime, however did not like it due to morning after, worked for a bit, but now is not working\par tried seroquel, now can sleep 10-2, but can fall back asleep, however stopped because she read that it gained weight\par wishes to try ambien CR, will try\par \par ALLERGIES\par singulair works a little, continue\par added advair, but could not afford, gave other options\par saw pulmonologist - who also referred to cardiology prior to changing medications\par did pft - measured cpap\par \par HTN\par The patient has a diagnosis of hypertension.  The diagnosis was discussed with patient and need for medication compliance and possible side affects and risks of noncompliance. Patient was told to adhere to a low salt diet and try to incorporate exercise daily.\par monitor\par \par BACK PAIN & LEFT KNEE PAIN\par will try mobic\par want to stay away from controlled substances\par had bilateral knee injections

## 2020-07-07 NOTE — HISTORY OF PRESENT ILLNESS
[Stopped use since ___] : No tobacco use since [unfilled] [Cigarettes ___ packs/day] : Patient smokes [unfilled] packs of cigarettes per day [___ Year(s)] : [unfilled] year(s) ago [Maintenance] : Maintenance: former smoker who stopped smoking  longer that 6 months ago but less that 5 years [Maintain commitment] : Maintain commitment [FreeTextEntry8] : 69 year old female  here for annual well visit. Patient's blood work was drawn and medications reviewed. Patient's past medical history was reviewed, allergies verified and problems were identified and assessed. Patients medications were reviewed. Patient is feeling well with no new or active complaints at this time.\par \par also wishes to discuss her smoking\par \par has multple complaints\par back pain\par allergies\par anxiety - having several meltdowns, panic attacks, not doing well\par \par  had sleep study. mild sleep apnea. feeling better with the cpap machine\par  Patients active medications, allergies and issues were all reviewed with the patient at time of visit.\par \par tried cymbalta, did not like it\par has positive results with wellbutrin however\par bad mood - doesn’t get along with sister and her rent was increased

## 2020-07-07 NOTE — PHYSICAL EXAM
[Well Nourished] : well nourished [No Acute Distress] : no acute distress [Well-Appearing] : well-appearing [Well Developed] : well developed [No Lymphadenopathy] : no lymphadenopathy [Clear to Auscultation] : lungs were clear to auscultation bilaterally [Regular Rhythm] : with a regular rhythm [Normal S1, S2] : normal S1 and S2 [No Rash] : no rash [Normal Posterior Cervical Nodes] : no posterior cervical lymphadenopathy [Normal Affect] : the affect was normal [Coordination Grossly Intact] : coordination grossly intact [Normal Insight/Judgement] : insight and judgment were intact [Normal Mood] : the mood was normal

## 2020-07-08 ENCOUNTER — APPOINTMENT (OUTPATIENT)
Dept: CARDIOLOGY | Facility: CLINIC | Age: 70
End: 2020-07-08
Payer: MEDICARE

## 2020-07-08 VITALS — SYSTOLIC BLOOD PRESSURE: 136 MMHG | HEART RATE: 71 BPM | DIASTOLIC BLOOD PRESSURE: 76 MMHG

## 2020-07-08 PROCEDURE — 99204 OFFICE O/P NEW MOD 45 MIN: CPT

## 2020-07-08 NOTE — HISTORY OF PRESENT ILLNESS
[FreeTextEntry1] : 69F with HTN, COPD, DVT post rotator cuff surgery in 2016 tx with 6 months of warfarin who presents for evaluation of exertional chest tightness, dyspnea on exertion\par \par Has been going on for several months. She notes that when she exerts herself, she feels chest tightness. Resolved with rest. Nonradiating. Also notes accompanying shortness of breath. She notes that the tightness is associated with her sensation of not being able to breathe. Had a cardiac cath around  which was normal. Saw Dr. Neumann of pulmonology who recommended seeing a cardiology. Intermittent LE edema. Able to lie flat on her back at night without difficulty. \par \par Former smoker, 50 pack year hx. Brother had CABG at age 66. Brother  at 60 from an MI. Both parents had MI's. Used to work in a senior center. \par \par ECG: SR, with anteroseptal ST changes, possible ischemia\par TTE 2016: Normal\par \par Atenolol 25mg,

## 2020-07-08 NOTE — REVIEW OF SYSTEMS
[Fever] : no fever [Chills] : no chills [Eyeglasses] : not currently wearing eyeglasses [Blurry Vision] : no blurred vision [Shortness Of Breath] : no shortness of breath [Chest Pain] : chest pain [Dyspnea on exertion] : dyspnea during exertion [Lower Ext Edema] : no extremity edema [Abdominal Pain] : no abdominal pain [Cough] : no cough [Heartburn] : no heartburn [Dysphagia] : no dysphagia [Dysuria] : no dysuria [Skin: A Rash] : no rash: [Joint Pain] : no joint pain

## 2020-07-08 NOTE — DISCUSSION/SUMMARY
[FreeTextEntry1] : 69F with HTN, COPD, DVT hx with exertional CP, dyspnea\par \par Check TTE\par Cannot tolerate exercise stress testing due to knee osteoarthritis, dyspnea.\par Check CTA to rule out CAD\par \par Cont BP meds, BP adequate\par May benefit from a statin, but lipids are excellent

## 2020-07-08 NOTE — PHYSICAL EXAM
[General Appearance - Well Developed] : well developed [Normal Appearance] : normal appearance [Well Groomed] : well groomed [No Deformities] : no deformities [General Appearance - In No Acute Distress] : no acute distress [General Appearance - Well Nourished] : well nourished [Normal Conjunctiva] : the conjunctiva exhibited no abnormalities [Normal Oral Mucosa] : normal oral mucosa [Eyelids - No Xanthelasma] : the eyelids demonstrated no xanthelasmas [No Oral Cyanosis] : no oral cyanosis [Normal Jugular Venous A Waves Present] : normal jugular venous A waves present [No Oral Pallor] : no oral pallor [Normal Jugular Venous V Waves Present] : normal jugular venous V waves present [Heart Rate And Rhythm] : heart rate and rhythm were normal [No Jugular Venous Villarreal A Waves] : no jugular venous villarreal A waves [Murmurs] : no murmurs present [Edema] : no peripheral edema present [Heart Sounds] : normal S1 and S2 [Exaggerated Use Of Accessory Muscles For Inspiration] : no accessory muscle use [Respiration, Rhythm And Depth] : normal respiratory rhythm and effort [Auscultation Breath Sounds / Voice Sounds] : lungs were clear to auscultation bilaterally [Abdomen Tenderness] : non-tender [Abdomen Soft] : soft [Abdomen Mass (___ Cm)] : no abdominal mass palpated [Abnormal Walk] : normal gait [Nail Clubbing] : no clubbing of the fingernails [Cyanosis, Localized] : no localized cyanosis [Petechial Hemorrhages (___cm)] : no petechial hemorrhages [Skin Color & Pigmentation] : normal skin color and pigmentation [] : no ischemic changes [No Skin Ulcers] : no skin ulcer [No Venous Stasis] : no venous stasis [No Xanthoma] : no  xanthoma was observed [Skin Lesions] : no skin lesions [Oriented To Time, Place, And Person] : oriented to person, place, and time [Mood] : the mood was normal [Affect] : the affect was normal [No Anxiety] : not feeling anxious

## 2020-07-10 LAB — BACTERIA UR CULT: ABNORMAL

## 2020-07-13 ENCOUNTER — APPOINTMENT (OUTPATIENT)
Dept: CT IMAGING | Facility: CLINIC | Age: 70
End: 2020-07-13

## 2020-07-17 ENCOUNTER — APPOINTMENT (OUTPATIENT)
Dept: CT IMAGING | Facility: CLINIC | Age: 70
End: 2020-07-17

## 2020-07-20 ENCOUNTER — APPOINTMENT (OUTPATIENT)
Dept: INTERNAL MEDICINE | Facility: CLINIC | Age: 70
End: 2020-07-20
Payer: MEDICARE

## 2020-07-20 ENCOUNTER — APPOINTMENT (OUTPATIENT)
Dept: FAMILY MEDICINE | Facility: CLINIC | Age: 70
End: 2020-07-20
Payer: MEDICARE

## 2020-07-20 VITALS
HEIGHT: 63 IN | SYSTOLIC BLOOD PRESSURE: 130 MMHG | DIASTOLIC BLOOD PRESSURE: 90 MMHG | BODY MASS INDEX: 34.55 KG/M2 | WEIGHT: 195 LBS

## 2020-07-20 LAB
BILIRUB UR QL STRIP: NEGATIVE
GLUCOSE UR-MCNC: NEGATIVE
HCG UR QL: 0.2 EU/DL
HGB UR QL STRIP.AUTO: ABNORMAL
KETONES UR-MCNC: NEGATIVE
LEUKOCYTE ESTERASE UR QL STRIP: ABNORMAL
NITRITE UR QL STRIP: NEGATIVE
PH UR STRIP: 7
PROT UR STRIP-MCNC: NEGATIVE
SP GR UR STRIP: 1.02

## 2020-07-20 PROCEDURE — 81002 URINALYSIS NONAUTO W/O SCOPE: CPT

## 2020-07-20 PROCEDURE — 93306 TTE W/DOPPLER COMPLETE: CPT

## 2020-07-20 PROCEDURE — 99214 OFFICE O/P EST MOD 30 MIN: CPT | Mod: 25

## 2020-07-20 RX ORDER — ZOLPIDEM TARTRATE 12.5 MG/1
12.5 TABLET, EXTENDED RELEASE ORAL
Qty: 30 | Refills: 2 | Status: DISCONTINUED | COMMUNITY
Start: 2020-06-10 | End: 2020-07-20

## 2020-07-20 RX ORDER — CIPROFLOXACIN HYDROCHLORIDE 500 MG/1
500 TABLET, FILM COATED ORAL
Qty: 10 | Refills: 0 | Status: DISCONTINUED | COMMUNITY
Start: 2020-06-25 | End: 2020-07-20

## 2020-07-20 NOTE — PHYSICAL EXAM
[No Acute Distress] : no acute distress [Well Nourished] : well nourished [Well Developed] : well developed [Well-Appearing] : well-appearing [Regular Rhythm] : with a regular rhythm [No Lymphadenopathy] : no lymphadenopathy [Clear to Auscultation] : lungs were clear to auscultation bilaterally [Normal Posterior Cervical Nodes] : no posterior cervical lymphadenopathy [No Rash] : no rash [Normal S1, S2] : normal S1 and S2 [Coordination Grossly Intact] : coordination grossly intact [Normal Affect] : the affect was normal [Normal Insight/Judgement] : insight and judgment were intact [Normal Mood] : the mood was normal

## 2020-07-20 NOTE — ASSESSMENT
[FreeTextEntry1] : uti\par Patient was advised to take all medications as prescribed and to finish any antibiotics in their entirety. Patient was told to rest, hydrate and treat symptoms as necessary. Patient was advised to return to this office or go directly to the ER if symptoms do not improve or if any worsening occurs.\par feeling burning, itching\par +leuk +blood\par last treatment was cipro\par bactrim x 7 days\par \par Patient with long smoking history. Reviewed history and smoking cessation strategies. Discussed benefits of low dose CT scan for early detection of lung cancer.  Risks and benefits reviewed.  Some risks discussed were radiation exposure, false positives, incidental findings. Will order low dose CT of lungs for assessment.\par \par \par abnormal ekg - seeing cardiology, echo was normal and going for CTA\par reviewed with patient\par \par DEPRESSION/ANXIETY\par Discussed diagnosis of anxiety and depression with the patient and potential outcomes/side affects of treatment versus non treatment. Medications were assessed and described at length. Side affects and black box warning were discussed.  Patient was advised to continue will all medications prescribed and the need for compliance was discussed and emphasized. Patient was advised to not stop medications without discussing with a health care provider first. Patient was advised to continue psychotherapy or seek therapy if not currently attending. Patient was educated on addictive potential of controlled substances and was counseled to use only as needed and sparingly. Patient verbalized understanding of all the above.\par \par due to covid and her situation living with her sister who is very difficult, financial issues, patient cannot sleep, is always unhappy, stressed, panic attacks, long family history of psych and mood disorders\par prior to the current situation, patient has long standing history of depression, anxiety and insomnia, this is severe acute on chronic issues\par reinforced patient must see psychotherapist for counseling - will call and make an appointment with the number I provided\par \par realizes that she does not like klonopin, worked in the beginning, did not like how it made her feel\par at this point, will continue wellbutrin, which was helping, and increased buspar to 30 bid, which has helped, decreased anxiety, only has one panic attack, even breathing better\par \par major issues are depression and insomnia: \par spent time discussing that some of her feelings are situational due to covid, having no money, no where to go and stuck with her sister, lost 10 friends to covid\par \par INSOMNIA\par chronic, now in acute flare\par failed, ambien, benadryl, remeron\par tried klonipin 1-2 prior to bedtime, however did not like it due to morning after, worked for a bit, but now is not working\par tried seroquel, now can sleep 10-2, but can fall back asleep, however stopped because she read that it gained weight\par tried ambien, cr, did not like it, prefers immediate release , HOWEVER, suggest trying seroquel again, will try\par \par ALLERGIES\par singulair works a little, continue\par added advair, but could not afford, gave other options\par saw pulmonologist - who also referred to cardiology prior to changing medications\par did pft - measured cpap\par \par HTN\par The patient has a diagnosis of hypertension.  The diagnosis was discussed with patient and need for medication compliance and possible side affects and risks of noncompliance. Patient was told to adhere to a low salt diet and try to incorporate exercise daily.\par monitor\par \par BACK PAIN & LEFT KNEE PAIN\par will try mobic\par want to stay away from controlled substances\par had bilateral knee injections

## 2020-07-20 NOTE — HISTORY OF PRESENT ILLNESS
[Stopped use since ___] : No tobacco use since [unfilled] [Cigarettes ___ packs/day] : Patient smokes [unfilled] packs of cigarettes per day [___ Year(s)] : [unfilled] year(s) ago [Maintain commitment] : Maintain commitment [FreeTextEntry8] : 69 year old female  here for annual well visit. Patient's blood work was drawn and medications reviewed. Patient's past medical history was reviewed, allergies verified and problems were identified and assessed. Patients medications were reviewed. Patient is feeling well with no new or active complaints at this time.\par \par also wishes to discuss her smoking\par \par has multple complaints\par back pain\par allergies\par anxiety - having several meltdowns, panic attacks, not doing well\par \par  had sleep study. mild sleep apnea. feeling better with the cpap machine\par  Patients active medications, allergies and issues were all reviewed with the patient at time of visit.\par \par tried cymbalta, did not like it\par has positive results with wellbutrin however\par bad mood - doesn’t get along with sister and her rent was increased  [Maintenance] : Maintenance: former smoker who stopped smoking  longer that 6 months ago but less that 5 years

## 2020-07-20 NOTE — HEALTH RISK ASSESSMENT
[] : No [No falls in past year] : Patient reported no falls in the past year [[id number]] : 11 [2] : 2) Feeling down, depressed, or hopeless for more than half of the days (2) [[id number]] : 4 [Very Good] : ~his/her~ current health as very good [Fair] :  ~his/her~ mood as fair [Patient declined mammogram] : Patient declined mammogram [Patient declined PAP Smear] : Patient declined PAP Smear [Patient declined colonoscopy] : Patient declined colonoscopy [HIV Test offered] : HIV Test offered [Hepatitis C test offered] : Hepatitis C test offered [] :  [With Family] : lives with family [Fully functional (bathing, dressing, toileting, transferring, walking, feeding)] : Fully functional (bathing, dressing, toileting, transferring, walking, feeding) [# Of Children ___] : has [unfilled] children [Smoke Detector] : smoke detector [Seat Belt] :  uses seat belt [Fully functional (using the telephone, shopping, preparing meals, housekeeping, doing laundry, using] : Fully functional and needs no help or supervision to perform IADLs (using the telephone, shopping, preparing meals, housekeeping, doing laundry, using transportation, managing medications and managing finances) [FreeTextEntry3] : passed 2004 [de-identified] : lives with sister [Name: ___] : Health Care Proxy's Name: [unfilled]  [Designated Healthcare Proxy] : Designated healthcare proxy [With Patient/Caregiver] : With Patient/Caregiver [AdvancecareDate] : 06/22/2020 [Relationship: ___] : Relationship: [unfilled] [FreeTextEntry4] : daughter knows wishes

## 2020-07-20 NOTE — REVIEW OF SYSTEMS
[Heartburn] : heartburn [Insomnia] : insomnia [Anxiety] : anxiety [Depression] : depression [Negative] : Heme/Lymph [FreeTextEntry9] : knee pain [de-identified] : severe

## 2020-07-22 ENCOUNTER — APPOINTMENT (OUTPATIENT)
Dept: FAMILY MEDICINE | Facility: CLINIC | Age: 70
End: 2020-07-22

## 2020-07-23 LAB — BACTERIA UR CULT: ABNORMAL

## 2020-08-04 ENCOUNTER — APPOINTMENT (OUTPATIENT)
Dept: UROLOGY | Facility: CLINIC | Age: 70
End: 2020-08-04
Payer: MEDICARE

## 2020-08-04 VITALS
DIASTOLIC BLOOD PRESSURE: 87 MMHG | SYSTOLIC BLOOD PRESSURE: 158 MMHG | HEART RATE: 59 BPM | RESPIRATION RATE: 15 BRPM | TEMPERATURE: 97.2 F

## 2020-08-04 DIAGNOSIS — N95.2 POSTMENOPAUSAL ATROPHIC VAGINITIS: ICD-10-CM

## 2020-08-04 PROCEDURE — 81003 URINALYSIS AUTO W/O SCOPE: CPT | Mod: QW

## 2020-08-04 PROCEDURE — 51798 US URINE CAPACITY MEASURE: CPT

## 2020-08-04 PROCEDURE — 99204 OFFICE O/P NEW MOD 45 MIN: CPT | Mod: 25

## 2020-08-04 PROCEDURE — 51701 INSERT BLADDER CATHETER: CPT

## 2020-08-04 RX ORDER — SULFAMETHOXAZOLE AND TRIMETHOPRIM 800; 160 MG/1; MG/1
800-160 TABLET ORAL TWICE DAILY
Qty: 14 | Refills: 0 | Status: DISCONTINUED | COMMUNITY
Start: 2020-07-20 | End: 2020-08-04

## 2020-08-04 NOTE — HISTORY OF PRESENT ILLNESS
[FreeTextEntry1] : 70 yo with uti-first noted June-rx cipro x 5 days then again in July-rx 5 days cipro\par July 20-uti again-rx bactrim x 7 days.\par Last c/s -staph aureus-?contam\par \par symptoms were dysuria, vaginal irritation, no fever\par denies urgency, freq, hematura\par \par Currently minimal symptoms-diff emptying\par \par previously had urethral sling x 2-first had mesh\par 2nd  had transabdominal procedure-both around 2000\par \par Had hematuria 2005- CT and cysto-all ok\par Has had episodes of retention as well in the past\par

## 2020-08-04 NOTE — PHYSICAL EXAM
[General Appearance - Well Nourished] : well nourished [General Appearance - Well Developed] : well developed [Normal Appearance] : normal appearance [Well Groomed] : well groomed [General Appearance - In No Acute Distress] : no acute distress [Respiration, Rhythm And Depth] : normal respiratory rhythm and effort [Edema] : no peripheral edema [Exaggerated Use Of Accessory Muscles For Inspiration] : no accessory muscle use [Abdomen Soft] : soft [Abdomen Tenderness] : non-tender [Costovertebral Angle Tenderness] : no ~M costovertebral angle tenderness [No Lesions] : no lesions  [Vulvar Atrophy] : vulvar atrophy [Normal] : was normal [Mass ___ mm] : There was no urethral mass. [Atrophy] : atrophy [Well Estrogenized] : Vaginal epithelium was poorly estrogenized. [Vaginal Cystocele] : no cystocele [Absent] : absent [Mass ___ cm] : no mass [Vaginal Rectocele] : no Rectocele [Distended] : no distention [Tenderness] : no tenderness [Ovarian Mass (___ Cm)] : there were no adnexal masses [PVR ___ mL] : The patient had a [unfilled] ~UmL post-void residual [Nl Perineum] : perineum was normal on inspection [Nl Inspection] : the anus was normal on inspection [] : no rash [No Focal Deficits] : no focal deficits [Normal Station and Gait] : the gait and station were normal for the patient's age [Not Anxious] : not anxious [Oriented To Time, Place, And Person] : oriented to person, place, and time [Affect] : the affect was normal [Mood] : the mood was normal [No Palpable Adenopathy] : no palpable adenopathy

## 2020-08-04 NOTE — REVIEW OF SYSTEMS
[Feeling Tired] : feeling tired [Dry Eyes] : dryness of the eyes [Eyesight Problems] : eyesight problems [Abdominal Pain] : abdominal pain [Shortness Of Breath] : shortness of breath [Diarrhea] : diarrhea [Date of last menstrual period ____] : date of last menstrual period: [unfilled] [Pain during urination] : pain during urination [Urine Infection (bladder/kidney)] : bladder/kidney infection [Told you have blood in urine on a urine test] : told blood was present in a urine test [History of kidney stones] : history of kidney stones [Urine retention] : urine retention [Wake up at night to urinate  How many times?  ___] : wakes up to urinate [unfilled] times during the night [Bladder pressure] : experiences bladder pressure [Strain or push to urinate] : strain or push to urinate [Wait a long time to urinate] : waits a long time to urinate [Slow urine stream] : slow urine stream [Interrupted urine stream] : interrupted urine stream [Bladder fullness after urinating] : bladder fullness after urinating [Leakage of urine with urgency] : leakage of urine with urgency [Joint Pain] : joint pain [Leakage of urine with straining, coughing, laughing] : leakage of urine with straining, coughing, laughing [Joint Swelling] : joint swelling [Depression] : depression [Limb Swelling] : limb swelling [Anxiety] : anxiety [Easy Bruising] : a tendency for easy bruising [Negative] : Endocrine

## 2020-08-04 NOTE — ASSESSMENT
[FreeTextEntry1] : 70 yo with UTI x 3\par currently fels better-completed atb\par cath c/s [id number]\par advise renal u/s-notes h/o stones-to return for that\par trial of HRT-estrace

## 2020-08-05 LAB
APPEARANCE: CLEAR
BACTERIA: ABNORMAL
BILIRUBIN URINE: NEGATIVE
BLOOD URINE: ABNORMAL
CALCIUM OXALATE CRYSTALS: ABNORMAL
COLOR: YELLOW
GLUCOSE QUALITATIVE U: NEGATIVE
HYALINE CASTS: 0 /LPF
KETONES URINE: NEGATIVE
LEUKOCYTE ESTERASE URINE: ABNORMAL
MICROSCOPIC-UA: NORMAL
NITRITE URINE: POSITIVE
PH URINE: 6
PROTEIN URINE: NORMAL
RED BLOOD CELLS URINE: 2 /HPF
SPECIFIC GRAVITY URINE: 1.02
SQUAMOUS EPITHELIAL CELLS: 0 /HPF
UROBILINOGEN URINE: NORMAL
WHITE BLOOD CELLS URINE: 54 /HPF

## 2020-08-10 LAB — BACTERIA UR CULT: ABNORMAL

## 2020-08-14 ENCOUNTER — APPOINTMENT (OUTPATIENT)
Dept: FAMILY MEDICINE | Facility: CLINIC | Age: 70
End: 2020-08-14
Payer: MEDICARE

## 2020-08-14 PROCEDURE — 99214 OFFICE O/P EST MOD 30 MIN: CPT | Mod: 95

## 2020-08-14 NOTE — PHYSICAL EXAM
[No Acute Distress] : no acute distress [Well Developed] : well developed [Well-Appearing] : well-appearing [Well Nourished] : well nourished [No Respiratory Distress] : no respiratory distress  [Normal Posterior Cervical Nodes] : no posterior cervical lymphadenopathy [Alert and Oriented x3] : oriented to person, place, and time [Normal Insight/Judgement] : insight and judgment were intact [de-identified] : sad

## 2020-08-14 NOTE — REVIEW OF SYSTEMS
[Heartburn] : heartburn [Insomnia] : insomnia [Depression] : depression [Anxiety] : anxiety [Negative] : Neurological [FreeTextEntry9] : knee pain [de-identified] : severe

## 2020-08-14 NOTE — ASSESSMENT
[FreeTextEntry1] : patient with multiple issues to discuss, just wanted "to talk"\par \par uti\par patient currently on macrobid, however is having side affects and is nauseous with diarrhea\par reviewed culture with patient\par advised if still symptomatic will call uro for alternate\par \par \par DEPRESSION/ANXIETY\par Discussed diagnosis of anxiety and depression with the patient and potential outcomes/side affects of treatment versus non treatment. Medications were assessed and described at length. Side affects and black box warning were discussed. Patient was advised to continue will all medications prescribed and the need for compliance was discussed and emphasized. Patient was advised to not stop medications without discussing with a health care provider first. Patient was advised to continue psychotherapy or seek therapy if not currently attending. Patient was educated on addictive potential of controlled substances and was counseled to use only as needed and sparingly. Patient verbalized understanding of all the above.\par \par due to covid and her situation living with her sister who is very difficult, financial issues, patient cannot sleep, is always unhappy, stressed, panic attacks, long family history of psych and mood disorders\par prior to the current situation, patient has long standing history of depression, anxiety and insomnia, this is severe acute on chronic issues, strong family history of psychiatric disorders\par reinforced patient must see psychotherapist for counseling - will call and make an appointment with the number I provided\par \par realizes that she does not like klonopin, worked in the beginning, did not like how it made her feel, also failed\par prozac, failed effexor because it stopped working, failed lexapro\par at this point, will continue wellbutrin, which was helping, on buspar 30 bid, decreased anxiety, only has one panic attack, but now having a down turn\par will add abilify - 2mg - \par increase seroquel 50 at night\par monitor symptoms\par FEELS ALONE, HER BROTHER IS DYING IN A PSYCH DUARTE\par MUST STOP COMBINING PREVIOUS MEDICATIONS - ADVISED TO DISCARD PREVIOUS MEDICATIONS\par  \par major issues are depression and insomnia: \par spent time discussing that some of her feelings are situational due to covid, having no money, no where to go and stuck with her sister, lost 10 friends to covid, brother is dying, unhappy, resentful of people who are happy, has strong family history of psych issues, long history of personal psych issues\par \par INSOMNIA\par chronic, now in acute flare\par failed, ambien, benadryl, remeron, failed ambien CR\par tried klonipin 1-2 prior to bedtime, however did not like it due to morning after, worked for a bit, but now is not working\par tried seroquel, now can sleep 10-2, but can fall back asleep, will try increasing to 50 and reassess\par \par \par ALLERGIES\par singulair works a little, continue\par added advair, but could not afford, gave other options\par saw pulmonologist - who also referred to cardiology prior to changing medications\par did pft - measured cpap\par \par HTN\par The patient has a diagnosis of hypertension.  The diagnosis was discussed with patient and need for medication compliance and possible side affects and risks of noncompliance. Patient was told to adhere to a low salt diet and try to incorporate exercise daily.\par monitor\par \par BACK PAIN & LEFT KNEE PAIN\par will try mobic\par want to stay away from controlled substances\par had bilateral knee injections

## 2020-08-14 NOTE — HISTORY OF PRESENT ILLNESS
[Stopped use since ___] : No tobacco use since [unfilled] [Cigarettes ___ packs/day] : Patient smokes [unfilled] packs of cigarettes per day [___ Year(s)] : [unfilled] year(s) ago [Maintenance] : Maintenance: former smoker who stopped smoking  longer that 6 months ago but less that 5 years [Medical Office: ([facility])___] : at the medical office located in  [Home] : at home, [unfilled] , at the time of the visit. [Maintain commitment] : Maintain commitment [Verbal consent obtained from patient] : the patient, [unfilled] [FreeTextEntry8] : patient with multiple issues to discuss, just wanted "to talk"\par \par

## 2020-08-14 NOTE — HEALTH RISK ASSESSMENT
[] : No [No falls in past year] : Patient reported no falls in the past year [[id number]] : 4 [2] : 2) Feeling down, depressed, or hopeless for more than half of the days (2) [[id number]] : 11 [Fair] :  ~his/her~ mood as fair [Very Good] : ~his/her~ current health as very good [Patient declined mammogram] : Patient declined mammogram [Patient declined PAP Smear] : Patient declined PAP Smear [Patient declined colonoscopy] : Patient declined colonoscopy [HIV Test offered] : HIV Test offered [Hepatitis C test offered] : Hepatitis C test offered [] :  [With Family] : lives with family [# Of Children ___] : has [unfilled] children [Fully functional (bathing, dressing, toileting, transferring, walking, feeding)] : Fully functional (bathing, dressing, toileting, transferring, walking, feeding) [Fully functional (using the telephone, shopping, preparing meals, housekeeping, doing laundry, using] : Fully functional and needs no help or supervision to perform IADLs (using the telephone, shopping, preparing meals, housekeeping, doing laundry, using transportation, managing medications and managing finances) [Seat Belt] :  uses seat belt [Smoke Detector] : smoke detector [de-identified] : lives with sister [With Patient/Caregiver] : With Patient/Caregiver [FreeTextEntry3] : passed 2004 [Name: ___] : Health Care Proxy's Name: [unfilled]  [Designated Healthcare Proxy] : Designated healthcare proxy [Relationship: ___] : Relationship: [unfilled] [AdvancecareDate] : 06/22/2020 [FreeTextEntry4] : daughter knows wishes

## 2020-08-25 ENCOUNTER — APPOINTMENT (OUTPATIENT)
Dept: UROLOGY | Facility: CLINIC | Age: 70
End: 2020-08-25
Payer: MEDICARE

## 2020-08-25 ENCOUNTER — APPOINTMENT (OUTPATIENT)
Dept: FAMILY MEDICINE | Facility: CLINIC | Age: 70
End: 2020-08-25
Payer: MEDICARE

## 2020-08-25 ENCOUNTER — LABORATORY RESULT (OUTPATIENT)
Age: 70
End: 2020-08-25

## 2020-08-25 VITALS — TEMPERATURE: 98.4 F | SYSTOLIC BLOOD PRESSURE: 160 MMHG | HEART RATE: 70 BPM | DIASTOLIC BLOOD PRESSURE: 90 MMHG

## 2020-08-25 VITALS — SYSTOLIC BLOOD PRESSURE: 152 MMHG | WEIGHT: 200 LBS | BODY MASS INDEX: 35.43 KG/M2 | DIASTOLIC BLOOD PRESSURE: 90 MMHG

## 2020-08-25 DIAGNOSIS — Z87.440 PERSONAL HISTORY OF URINARY (TRACT) INFECTIONS: ICD-10-CM

## 2020-08-25 DIAGNOSIS — N28.1 CYST OF KIDNEY, ACQUIRED: ICD-10-CM

## 2020-08-25 PROCEDURE — 76775 US EXAM ABDO BACK WALL LIM: CPT

## 2020-08-25 PROCEDURE — 99212 OFFICE O/P EST SF 10 MIN: CPT | Mod: 25

## 2020-08-25 PROCEDURE — 99214 OFFICE O/P EST MOD 30 MIN: CPT

## 2020-08-25 RX ORDER — NITROFURANTOIN (MONOHYDRATE/MACROCRYSTALS) 25; 75 MG/1; MG/1
100 CAPSULE ORAL TWICE DAILY
Qty: 14 | Refills: 0 | Status: DISCONTINUED | COMMUNITY
Start: 2020-08-10 | End: 2020-08-25

## 2020-08-25 NOTE — PHYSICAL EXAM
[No Acute Distress] : no acute distress [Well Nourished] : well nourished [Well Developed] : well developed [Well-Appearing] : well-appearing [Clear to Auscultation] : lungs were clear to auscultation bilaterally [Regular Rhythm] : with a regular rhythm [Normal S1, S2] : normal S1 and S2 [Normal Posterior Cervical Nodes] : no posterior cervical lymphadenopathy [Alert and Oriented x3] : oriented to person, place, and time [Normal Insight/Judgement] : insight and judgment were intact [de-identified] : sad

## 2020-08-25 NOTE — ASSESSMENT
[FreeTextEntry1] : patient with multiple issues to discuss, just wanted "to talk"\par \par DEPRESSION/ANXIETY\par Discussed diagnosis of anxiety and depression with the patient and potential outcomes/side affects of treatment versus non treatment. Medications were assessed and described at length. Side affects and black box warning were discussed. Patient was advised to continue will all medications prescribed and the need for compliance was discussed and emphasized. Patient was advised to not stop medications without discussing with a health care provider first. Patient was advised to continue psychotherapy or seek therapy if not currently attending. Patient was educated on addictive potential of controlled substances and was counseled to use only as needed and sparingly. Patient verbalized understanding of all the above.\par \par due to covid and her situation living with her sister who is very difficult, financial issues, patient cannot sleep, is always unhappy, stressed, panic attacks, long family history of psych and mood disorders\par prior to the current situation, patient has long standing history of depression, anxiety and insomnia, this is severe acute on chronic issues, strong family history of psychiatric disorders\par reinforced patient must see psychotherapist for counseling - will call and make an appointment with the number I provided\par \par realizes that she does not like klonopin, worked in the beginning, did not like how it made her feel, also failed\par prozac, failed effexor because it stopped working, failed lexapro\par at this point, will continue wellbutrin, which was helping, on buspar 30 bid, decreased anxiety, only has one panic attack, but now having a down turn\par will add abilify - 2mg - \par seroquel did not help at night to sleep\par \par FEELS ALONE, HER BROTHER IS DYING IN A PSYCH DUARTE\par MUST STOP COMBINING PREVIOUS MEDICATIONS - ADVISED TO DISCARD PREVIOUS MEDICATIONS\par  \par major issues are depression and insomnia: \par spent time discussing that some of her feelings are situational due to covid, having no money, no where to go and stuck with her sister, lost 10 friends to covid, brother is dying, unhappy, resentful of people who are happy, has strong family history of psych issues, long history of personal psych issues\par \par INSOMNIA\par chronic, now in acute flare\par failed, ambien, benadryl, remeron, failed ambien CR\par tried klonipin 1-2 prior to bedtime, however did not like it due to morning after, worked for a bit, but now is not working\par tried seroquel, which only worked for a few hours\par discussed options\par will try temezepam\par \par \par ALLERGIES\par singulair works a little, continue\par added advair, but could not afford, gave other options\par saw pulmonologist - who also referred to cardiology prior to changing medications\par did pft - measured cpap\par \par HTN\par The patient has a diagnosis of hypertension.  The diagnosis was discussed with patient and need for medication compliance and possible side affects and risks of noncompliance. Patient was told to adhere to a low salt diet and try to incorporate exercise daily.\par monitor\par \par BACK PAIN & LEFT KNEE PAIN\par will try mobic\par want to stay away from controlled substances\par had bilateral knee injections

## 2020-08-25 NOTE — HEALTH RISK ASSESSMENT
[] : No [No falls in past year] : Patient reported no falls in the past year [2] : 2) Feeling down, depressed, or hopeless for more than half of the days (2) [JJL2Oeahk] : 4 [OFI9Ruugj] : 11 [Very Good] : ~his/her~ current health as very good [Fair] :  ~his/her~ mood as fair [Patient declined mammogram] : Patient declined mammogram [Patient declined PAP Smear] : Patient declined PAP Smear [Patient declined colonoscopy] : Patient declined colonoscopy [HIV Test offered] : HIV Test offered [Hepatitis C test offered] : Hepatitis C test offered [With Family] : lives with family [] :  [# Of Children ___] : has [unfilled] children [Fully functional (bathing, dressing, toileting, transferring, walking, feeding)] : Fully functional (bathing, dressing, toileting, transferring, walking, feeding) [Fully functional (using the telephone, shopping, preparing meals, housekeeping, doing laundry, using] : Fully functional and needs no help or supervision to perform IADLs (using the telephone, shopping, preparing meals, housekeeping, doing laundry, using transportation, managing medications and managing finances) [Smoke Detector] : smoke detector [Seat Belt] :  uses seat belt [de-identified] : lives with sister [FreeTextEntry3] : passed 2004 [With Patient/Caregiver] : With Patient/Caregiver [Designated Healthcare Proxy] : Designated healthcare proxy [Name: ___] : Health Care Proxy's Name: [unfilled]  [Relationship: ___] : Relationship: [unfilled] [AdvancecareDate] : 06/22/2020 [FreeTextEntry4] : daughter knows wishes

## 2020-08-25 NOTE — HISTORY OF PRESENT ILLNESS
[de-identified] : 69 year old female is here for a followup visit. Patient is here for medication renewals and for blood work discussion. Medications and allergies were reviewed and assessed.  \par patient with multiple issues to discuss, just wanted "to talk"\par  [Stopped use since ___] : No tobacco use since [unfilled] [Cigarettes ___ packs/day] : Patient smokes [unfilled] packs of cigarettes per day [___ Year(s)] : [unfilled] year(s) ago [Maintenance] : Maintenance: former smoker who stopped smoking  longer that 6 months ago but less that 5 years [Maintain commitment] : Maintain commitment [FreeTextEntry8] : \par \par

## 2020-08-25 NOTE — REVIEW OF SYSTEMS
[Insomnia] : insomnia [Heartburn] : heartburn [Depression] : depression [Anxiety] : anxiety [Negative] : Heme/Lymph [FreeTextEntry9] : knee pain [de-identified] : severe/chronic

## 2020-08-26 NOTE — ASSESSMENT
[FreeTextEntry1] : continue estradiol\par awwait c/s\par \par u/s ? atypical cyst vs solid lesion-to get CT urogram\par also two broad calcification between skin and kidney-? etiology\par has had eswl and uscope but never pcnl

## 2020-08-27 LAB
APPEARANCE: CLEAR
BACTERIA: NEGATIVE
BILIRUBIN URINE: NEGATIVE
BLOOD URINE: NEGATIVE
COLOR: NORMAL
GLUCOSE QUALITATIVE U: NEGATIVE
HYALINE CASTS: 1 /LPF
KETONES URINE: NEGATIVE
LEUKOCYTE ESTERASE URINE: NEGATIVE
MICROSCOPIC-UA: NORMAL
NITRITE URINE: NEGATIVE
PH URINE: 6
PROTEIN URINE: NEGATIVE
RED BLOOD CELLS URINE: 2 /HPF
SPECIFIC GRAVITY URINE: 1.01
SQUAMOUS EPITHELIAL CELLS: 1 /HPF
UROBILINOGEN URINE: NORMAL
WHITE BLOOD CELLS URINE: 1 /HPF

## 2020-08-31 ENCOUNTER — RX RENEWAL (OUTPATIENT)
Age: 70
End: 2020-08-31

## 2020-08-31 RX ORDER — ALBUTEROL SULFATE 90 UG/1
108 (90 BASE) INHALANT RESPIRATORY (INHALATION)
Qty: 3 | Refills: 6 | Status: ACTIVE | COMMUNITY
Start: 2020-08-31 | End: 1900-01-01

## 2020-09-28 ENCOUNTER — APPOINTMENT (OUTPATIENT)
Dept: FAMILY MEDICINE | Facility: CLINIC | Age: 70
End: 2020-09-28
Payer: MEDICARE

## 2020-09-28 VITALS
SYSTOLIC BLOOD PRESSURE: 130 MMHG | HEART RATE: 64 BPM | RESPIRATION RATE: 16 BRPM | WEIGHT: 202 LBS | HEIGHT: 63 IN | DIASTOLIC BLOOD PRESSURE: 82 MMHG | OXYGEN SATURATION: 99 % | BODY MASS INDEX: 35.79 KG/M2

## 2020-09-28 PROCEDURE — 99214 OFFICE O/P EST MOD 30 MIN: CPT

## 2020-09-28 RX ORDER — LORATADINE 10 MG/1
10 TABLET ORAL
Qty: 90 | Refills: 0 | Status: DISCONTINUED | COMMUNITY
Start: 2020-04-15 | End: 2020-09-28

## 2020-09-28 RX ORDER — NYSTATIN 100000 1/G
100000 POWDER TOPICAL
Qty: 1 | Refills: 0 | Status: DISCONTINUED | COMMUNITY
Start: 2018-10-15 | End: 2020-09-28

## 2020-09-28 RX ORDER — TRAZODONE HYDROCHLORIDE 50 MG/1
50 TABLET ORAL
Qty: 30 | Refills: 0 | Status: DISCONTINUED | COMMUNITY
Start: 2020-06-11 | End: 2020-09-28

## 2020-09-28 RX ORDER — TRAMADOL HYDROCHLORIDE 25 MG/1
TABLET, COATED ORAL
Refills: 0 | Status: DISCONTINUED | COMMUNITY
End: 2020-09-28

## 2020-09-28 RX ORDER — VITAMIN E ACETATE 670 MG
2000 CAPSULE ORAL
Refills: 0 | Status: DISCONTINUED | COMMUNITY
End: 2020-09-28

## 2020-09-28 RX ORDER — ALBUTEROL SULFATE 90 UG/1
108 (90 BASE) AEROSOL, METERED RESPIRATORY (INHALATION)
Qty: 18 | Refills: 0 | Status: DISCONTINUED | COMMUNITY
Start: 2019-01-04 | End: 2020-09-28

## 2020-09-28 RX ORDER — TEMAZEPAM 15 MG/1
15 CAPSULE ORAL
Qty: 30 | Refills: 0 | Status: DISCONTINUED | COMMUNITY
Start: 2020-08-25 | End: 2020-09-28

## 2020-09-28 RX ORDER — ESTRADIOL 0.1 MG/G
0.1 CREAM VAGINAL
Qty: 1 | Refills: 3 | Status: DISCONTINUED | COMMUNITY
Start: 2020-08-04 | End: 2020-09-28

## 2020-09-28 RX ORDER — ALPRAZOLAM 2 MG/1
TABLET ORAL
Refills: 0 | Status: DISCONTINUED | COMMUNITY
End: 2020-09-28

## 2020-09-28 RX ORDER — ARIPIPRAZOLE 2 MG/1
2 TABLET ORAL
Qty: 30 | Refills: 1 | Status: DISCONTINUED | COMMUNITY
Start: 2020-08-14 | End: 2020-09-28

## 2020-09-28 NOTE — HEALTH RISK ASSESSMENT
[] : No [No falls in past year] : Patient reported no falls in the past year [2] : 2) Feeling down, depressed, or hopeless for more than half of the days (2) [ZPN3Vzaoc] : 4 [FAE3Tgdwe] : 11 [Very Good] : ~his/her~ current health as very good [Fair] :  ~his/her~ mood as fair [Patient declined mammogram] : Patient declined mammogram [Patient declined PAP Smear] : Patient declined PAP Smear [Patient declined colonoscopy] : Patient declined colonoscopy [HIV Test offered] : HIV Test offered [Hepatitis C test offered] : Hepatitis C test offered [With Family] : lives with family [] :  [# Of Children ___] : has [unfilled] children [Fully functional (bathing, dressing, toileting, transferring, walking, feeding)] : Fully functional (bathing, dressing, toileting, transferring, walking, feeding) [Fully functional (using the telephone, shopping, preparing meals, housekeeping, doing laundry, using] : Fully functional and needs no help or supervision to perform IADLs (using the telephone, shopping, preparing meals, housekeeping, doing laundry, using transportation, managing medications and managing finances) [Smoke Detector] : smoke detector [Seat Belt] :  uses seat belt [de-identified] : lives with sister [FreeTextEntry3] : passed 2004 [With Patient/Caregiver] : With Patient/Caregiver [Designated Healthcare Proxy] : Designated healthcare proxy [Name: ___] : Health Care Proxy's Name: [unfilled]  [Relationship: ___] : Relationship: [unfilled] [AdvancecareDate] : 06/22/2020 [FreeTextEntry4] : daughter knows wishes

## 2020-09-28 NOTE — REVIEW OF SYSTEMS
[Heartburn] : heartburn [Insomnia] : insomnia [Anxiety] : anxiety [Depression] : depression [Negative] : Heme/Lymph [FreeTextEntry9] : knee pain [de-identified] : severe/chronic

## 2020-09-28 NOTE — PHYSICAL EXAM
[No Acute Distress] : no acute distress [Well Nourished] : well nourished [Well Developed] : well developed [Well-Appearing] : well-appearing [Clear to Auscultation] : lungs were clear to auscultation bilaterally [Regular Rhythm] : with a regular rhythm [Normal S1, S2] : normal S1 and S2 [Normal Posterior Cervical Nodes] : no posterior cervical lymphadenopathy [Alert and Oriented x3] : oriented to person, place, and time [Normal Insight/Judgement] : insight and judgment were intact [de-identified] : sad

## 2020-09-28 NOTE — ASSESSMENT
[FreeTextEntry1] : patient with multiple issues to discuss, just wanted "to talk" and to change her medications\par \par DEPRESSION/ANXIETY\par Discussed diagnosis of anxiety and depression with the patient and potential outcomes/side affects of treatment versus non treatment. Medications were assessed and described at length. Side affects and black box warning were discussed. Patient was advised to continue will all medications prescribed and the need for compliance was discussed and emphasized. Patient was advised to not stop medications without discussing with a health care provider first. Patient was advised to continue psychotherapy or seek therapy if not currently attending. Patient was educated on addictive potential of controlled substances and was counseled to use only as needed and sparingly. Patient verbalized understanding of all the above.\par \par due to covid and her situation living with her sister who is very difficult, financial issues, patient cannot sleep, is always unhappy, stressed, panic attacks, long family history of psych and mood disorders\par prior to the current situation, patient has long standing history of depression, anxiety and insomnia, this is severe acute on chronic issues, strong family history of psychiatric disorders\par reinforced patient must see psychotherapist for counseling - will call and make an appointment with the number I provided\par \par realizes that she does not like klonopin, worked in the beginning, did not like how it made her feel, also failed\par prozac, failed effexor because it stopped working, failed lexapro\par at this point, will continue wellbutrin, which was helping, on buspar 15 bid, decreased anxiety, only has one panic attack, but now having a down turn\par stopped the abilify felt she did not need it\par seroquel did not help at night to sleep\par \par FEELS ALONE, HER BROTHER IS DYING IN A PSYCH DUARTE\par MUST STOP COMBINING PREVIOUS MEDICATIONS - ADVISED TO DISCARD PREVIOUS MEDICATIONS\par  \par major issues are depression and insomnia: \par spent time discussing that some of her feelings are situational due to covid, having no money, no where to go and stuck with her sister, lost 10 friends to covid, brother is dying, unhappy, resentful of people who are happy, has strong family history of psych issues, long history of personal psych issues\par \par INSOMNIA\par chronic, now in acute flare\par failed, ambien, benadryl, remeron, failed ambien CR, failed trazodone, failed tempazam, seroquel\par tried klonipin 1-2 prior to bedtime, however did not like it due to morning after, worked for a bit, but now is not working\par tried seroquel, which only worked for a few hours\par can try zyprexa\par \par ALLERGIES\par singulair works a little, continue\par added advair, but could not afford, gave other options\par saw pulmonologist - who also referred to cardiology prior to changing medications\par did pft - measured cpap\par \par HTN\par The patient has a diagnosis of hypertension.  The diagnosis was discussed with patient and need for medication compliance and possible side affects and risks of noncompliance. Patient was told to adhere to a low salt diet and try to incorporate exercise daily.\par monitor\par \par BACK PAIN & LEFT KNEE PAIN\par will try mobic\par want to stay away from controlled substances\par had bilateral knee injections

## 2020-09-28 NOTE — HISTORY OF PRESENT ILLNESS
[de-identified] : 69 year old female is here for a followup visit. Patient is here for medication renewals and for blood work discussion. Medications and allergies were reviewed and assessed.  \par patient with multiple issues to discuss, just wanted "to talk"\par  [Stopped use since ___] : No tobacco use since [unfilled] [Cigarettes ___ packs/day] : Patient smokes [unfilled] packs of cigarettes per day [___ Year(s)] : [unfilled] year(s) ago [Maintenance] : Maintenance: former smoker who stopped smoking  longer that 6 months ago but less that 5 years [Maintain commitment] : Maintain commitment [FreeTextEntry8] : \par \par

## 2020-10-12 ENCOUNTER — APPOINTMENT (OUTPATIENT)
Dept: FAMILY MEDICINE | Facility: CLINIC | Age: 70
End: 2020-10-12
Payer: MEDICARE

## 2020-10-12 VITALS
BODY MASS INDEX: 35.79 KG/M2 | OXYGEN SATURATION: 96 % | TEMPERATURE: 98.4 F | HEART RATE: 63 BPM | RESPIRATION RATE: 16 BRPM | HEIGHT: 63 IN | SYSTOLIC BLOOD PRESSURE: 143 MMHG | WEIGHT: 202 LBS | DIASTOLIC BLOOD PRESSURE: 80 MMHG

## 2020-10-12 PROCEDURE — 99214 OFFICE O/P EST MOD 30 MIN: CPT

## 2020-10-12 RX ORDER — OLANZAPINE 10 MG/1
10 TABLET, FILM COATED ORAL
Qty: 20 | Refills: 1 | Status: DISCONTINUED | COMMUNITY
Start: 2020-09-28 | End: 2020-10-12

## 2020-10-12 NOTE — HEALTH RISK ASSESSMENT
[No falls in past year] : Patient reported no falls in the past year [2] : 2) Feeling down, depressed, or hopeless for more than half of the days (2) [Very Good] : ~his/her~ current health as very good [Fair] :  ~his/her~ mood as fair [Patient declined mammogram] : Patient declined mammogram [Patient declined PAP Smear] : Patient declined PAP Smear [Patient declined colonoscopy] : Patient declined colonoscopy [HIV Test offered] : HIV Test offered [Hepatitis C test offered] : Hepatitis C test offered [With Family] : lives with family [] :  [# Of Children ___] : has [unfilled] children [Fully functional (bathing, dressing, toileting, transferring, walking, feeding)] : Fully functional (bathing, dressing, toileting, transferring, walking, feeding) [Fully functional (using the telephone, shopping, preparing meals, housekeeping, doing laundry, using] : Fully functional and needs no help or supervision to perform IADLs (using the telephone, shopping, preparing meals, housekeeping, doing laundry, using transportation, managing medications and managing finances) [Smoke Detector] : smoke detector [Seat Belt] :  uses seat belt [With Patient/Caregiver] : With Patient/Caregiver [Designated Healthcare Proxy] : Designated healthcare proxy [Name: ___] : Health Care Proxy's Name: [unfilled]  [Relationship: ___] : Relationship: [unfilled] [] : No [[id number]] : 4 [[id number]] : 11 [de-identified] : lives with sister [FreeTextEntry3] : passed 2004 [AdvancecareDate] : 06/22/2020 [FreeTextEntry4] : daughter knows wishes

## 2020-10-12 NOTE — HISTORY OF PRESENT ILLNESS
[Stopped use since ___] : No tobacco use since [unfilled] [Cigarettes ___ packs/day] : Patient smokes [unfilled] packs of cigarettes per day [___ Year(s)] : [unfilled] year(s) ago [Maintenance] : Maintenance: former smoker who stopped smoking  longer that 6 months ago but less that 5 years [Maintain commitment] : Maintain commitment [de-identified] : 69 year old female is here for a followup visit. Patient is here for medication renewals and for blood work discussion. Medications and allergies were reviewed and assessed.  \par patient with multiple issues to discuss, just wanted "to talk", is feeling down today, feeling blah\par  [FreeTextEntry8] : \par \par

## 2020-10-12 NOTE — ASSESSMENT
[FreeTextEntry1] : patient with multiple issues to discuss, just wanted "to talk" and to change her medications\par \par   DEPRESSION/ANXIETY Discussed diagnosis of anxiety and depression with the patient and potential outcomes/side affects of treatment versus non treatment. Medications were assessed and described at length. Side affects and black box warning were discussed. Patient was advised to continue will all medications prescribed and the need for compliance was discussed and emphasized. Patient was advised to not stop medications without discussing with a health care provider first. Patient was advised to continue psychotherapy or seek therapy if not currently attending. Patient was educated on addictive potential of controlled substances and was counseled to use only as needed and sparingly. Patient verbalized understanding of all the above.  due to covid and her situation living with her sister who is very difficult, financial issues, patient cannot sleep, is always unhappy, stressed, panic attacks, long family history of psych and mood disorders prior to the current situation, patient has long standing history of depression, anxiety and insomnia, this is severe acute on chronic issues, strong family history of psychiatric disorders reinforced patient must see psychotherapist for counseling - will call and make an appointment with the number I provided  \par realizes that she does not like klonopin, worked in the beginning, did not like how it made her feel, also failed prozac, failed effexor because it stopped working, failed lexapro at this point, will continue wellbutrin, which was helping, on buspar 15 bid, decreased anxiety, only has one panic attack, but now having a down turn stopped the abilify felt she did not need it \par seroquel did not help at night to sleep\par  \par  FEELS ALONE, HER BROTHER IS DYING IN A PSYCH DUARTE \par  MUST STOP COMBINING PREVIOUS MEDICATIONS - ADVISED TO DISCARD PREVIOUS MEDICATIONS   major issues are depression and insomnia:  spent time discussing that some of her feelings are situational due to covid, having no money, no where to go and stuck with her sister, lost 10 friends to covid, brother is dying, unhappy, resentful of people who are happy, has strong family history of psych issues, long history of personal psych issues  \par \par INSOMNIA \par chronic, now in acute flare failed, ambien, benadryl, remeron, failed ambien CR, failed trazodone, failed tempazam, seroquel tried klonipin 1-2 prior to bedtime, however did not like it due to morning after, worked for a bit, but now is not working tried seroquel, reports not liking zyprexa, wants ambien again\par \par   ALLERGIES\par  singulair works a little, continue added advair, but could not afford, gave other options saw pulmonologist - who also referred to cardiology prior to changing medications did pft - measured cpap\par \par   HTN \par The patient has a diagnosis of hypertension.  The diagnosis was discussed with patient and need for medication compliance and possible side affects and risks of noncompliance. Patient was told to adhere to a low salt diet and try to incorporate exercise daily. monitor\par \par   BACK PAIN & LEFT KNEE PAIN \par will try mobic want to stay away from controlled substances had bilateral knee injections,

## 2020-10-12 NOTE — REVIEW OF SYSTEMS
[Heartburn] : heartburn [Insomnia] : insomnia [Anxiety] : anxiety [Depression] : depression [Negative] : Heme/Lymph [FreeTextEntry9] : knee pain [de-identified] : severe/chronic

## 2020-11-03 ENCOUNTER — RX RENEWAL (OUTPATIENT)
Age: 70
End: 2020-11-03

## 2020-11-24 ENCOUNTER — APPOINTMENT (OUTPATIENT)
Dept: FAMILY MEDICINE | Facility: CLINIC | Age: 70
End: 2020-11-24

## 2020-11-26 ENCOUNTER — TRANSCRIPTION ENCOUNTER (OUTPATIENT)
Age: 70
End: 2020-11-26

## 2020-12-15 ENCOUNTER — APPOINTMENT (OUTPATIENT)
Dept: FAMILY MEDICINE | Facility: CLINIC | Age: 70
End: 2020-12-15
Payer: MEDICARE

## 2020-12-15 VITALS
SYSTOLIC BLOOD PRESSURE: 140 MMHG | HEART RATE: 61 BPM | BODY MASS INDEX: 36.5 KG/M2 | HEIGHT: 63 IN | DIASTOLIC BLOOD PRESSURE: 80 MMHG | WEIGHT: 206 LBS | OXYGEN SATURATION: 97 %

## 2020-12-15 DIAGNOSIS — K21.9 GASTRO-ESOPHAGEAL REFLUX DISEASE W/OUT ESOPHAGITIS: ICD-10-CM

## 2020-12-15 PROBLEM — Z87.09 HISTORY OF ACUTE BRONCHITIS: Status: RESOLVED | Noted: 2017-11-27 | Resolved: 2020-12-15

## 2020-12-15 PROCEDURE — 99214 OFFICE O/P EST MOD 30 MIN: CPT

## 2020-12-15 RX ORDER — FLUTICASONE FUROATE AND VILANTEROL TRIFENATATE 200; 25 UG/1; UG/1
200-25 POWDER RESPIRATORY (INHALATION)
Qty: 3 | Refills: 6 | Status: DISCONTINUED | COMMUNITY
Start: 2020-04-29 | End: 2020-12-15

## 2020-12-15 NOTE — HEALTH RISK ASSESSMENT
[] : No [No falls in past year] : Patient reported no falls in the past year [2] : 2) Feeling down, depressed, or hopeless for more than half of the days (2) [MAZ2Oohan] : 4 [SBT9Cfmez] : 11 [Very Good] : ~his/her~ current health as very good [Fair] :  ~his/her~ mood as fair [Patient declined mammogram] : Patient declined mammogram [Patient declined PAP Smear] : Patient declined PAP Smear [Patient declined colonoscopy] : Patient declined colonoscopy [HIV Test offered] : HIV Test offered [Hepatitis C test offered] : Hepatitis C test offered [With Family] : lives with family [] :  [# Of Children ___] : has [unfilled] children [Fully functional (bathing, dressing, toileting, transferring, walking, feeding)] : Fully functional (bathing, dressing, toileting, transferring, walking, feeding) [Fully functional (using the telephone, shopping, preparing meals, housekeeping, doing laundry, using] : Fully functional and needs no help or supervision to perform IADLs (using the telephone, shopping, preparing meals, housekeeping, doing laundry, using transportation, managing medications and managing finances) [Smoke Detector] : smoke detector [Seat Belt] :  uses seat belt [de-identified] : lives with sister [FreeTextEntry3] : passed 2004 [With Patient/Caregiver] : With Patient/Caregiver [Designated Healthcare Proxy] : Designated healthcare proxy [Name: ___] : Health Care Proxy's Name: [unfilled]  [Relationship: ___] : Relationship: [unfilled] [AdvancecareDate] : 06/22/2020 [FreeTextEntry4] : daughter knows wishes

## 2020-12-15 NOTE — PHYSICAL EXAM
[Well Nourished] : well nourished [Well Developed] : well developed [Well-Appearing] : well-appearing [Clear to Auscultation] : lungs were clear to auscultation bilaterally [Regular Rhythm] : with a regular rhythm [Normal S1, S2] : normal S1 and S2 [Normal Posterior Cervical Nodes] : no posterior cervical lymphadenopathy [Normal Affect] : the affect was normal [Alert and Oriented x3] : oriented to person, place, and time [Normal Insight/Judgement] : insight and judgment were intact

## 2020-12-15 NOTE — REVIEW OF SYSTEMS
[Heartburn] : heartburn [Insomnia] : insomnia [Anxiety] : anxiety [Depression] : depression [Negative] : Heme/Lymph [FreeTextEntry9] : knee pain [de-identified] : severe/chronic

## 2020-12-15 NOTE — ASSESSMENT
[FreeTextEntry1] : DEPRESSION/ANXIETY\par  Discussed diagnosis of anxiety and depression with the patient and potential outcomes/side affects of treatment versus non treatment. Medications were assessed and described at length. Side affects and black box warning were discussed. Patient was advised to continue will all medications prescribed and the need for compliance was discussed and emphasized. Patient was advised to not stop medications without discussing with a health care provider first. Patient was advised to continue psychotherapy or seek therapy if not currently attending. Patient was educated on addictive potential of controlled substances and was counseled to use only as needed and sparingly. Patient verbalized understanding of all the above.  due to covid and her situation living with her sister who is very difficult, financial issues, patient cannot sleep, is always unhappy, stressed, panic attacks, long family history of psych and mood disorders prior to the current situation, patient has long standing history of depression, anxiety and insomnia, this is severe acute on chronic issues, strong family history of psychiatric disorders reinforced patient must see psychotherapist for counseling - will call and make an appointment with the number I provided  \par realizes that she does not like klonopin, worked in the beginning, did not like how it made her feel, also failed prozac, failed effexor because it stopped working, failed lexapro , at this point will continue wellbutrin, which was helping, on buspar 15 bid, decreased anxiety, only has one panic attack\par seroquel did not help at night to sleep\par  \par  FEELS ALONE, HER BROTHER IS DYING IN A PSYCH DUARTE \par  MUST STOP COMBINING PREVIOUS MEDICATIONS - ADVISED TO DISCARD PREVIOUS MEDICATIONS   major issues are depression and insomnia:  spent time discussing that some of her feelings are situational due to covid, having no money, no where to go and stuck with her sister, lost 10 friends to covid, brother is dying, unhappy, resentful of people who are happy, has strong family history of psych issues, long history of personal psych issues  \par \par INSOMNIA \par chronic, now in acute flare failed, ambien, benadryl, remeron, failed ambien CR, failed trazodone, failed tempazam, seroquel , tried klonipin 1-2 prior to bedtime, however did not like it due to morning after, failed zyprexa\par \par   ALLERGIES\par  singulair works a little, continue , added advair, but could not afford, gave other options,  saw pulmonologist - who also referred to cardiology prior to changing medications\par is on CPAP\par \par   HTN \par The patient has a diagnosis of hypertension.  The diagnosis was discussed with patient and need for medication compliance and possible side affects and risks of noncompliance. Patient was told to adhere to a low salt diet and try to incorporate exercise daily. monitor\par \par  \par bilateral knee pain\par reports that she cannot get surgery because she has no one to help her at home.  mobic did not help\par patient already has had bilateral knee injections\par using tramadol only as needed

## 2020-12-15 NOTE — HISTORY OF PRESENT ILLNESS
[de-identified] : 69 year old female is here for a followup visit. Patient is here for medication renewals and for blood work discussion. Medications and allergies were reviewed and assessed.  \par patient with multiple issues to discuss\par  [Stopped use since ___] : No tobacco use since [unfilled] [Cigarettes ___ packs/day] : Patient smokes [unfilled] packs of cigarettes per day [___ Year(s)] : [unfilled] year(s) ago [Maintenance] : Maintenance: former smoker who stopped smoking  longer that 6 months ago but less that 5 years [Maintain commitment] : Maintain commitment [FreeTextEntry8] : \par \par

## 2020-12-21 PROBLEM — J06.9 ACUTE URI: Status: RESOLVED | Noted: 2019-01-04 | Resolved: 2020-12-21

## 2020-12-23 PROBLEM — Z87.440 HISTORY OF URINARY TRACT INFECTION: Status: RESOLVED | Noted: 2020-06-25 | Resolved: 2020-12-23

## 2020-12-25 ENCOUNTER — TRANSCRIPTION ENCOUNTER (OUTPATIENT)
Age: 70
End: 2020-12-25

## 2021-01-11 ENCOUNTER — RX RENEWAL (OUTPATIENT)
Age: 71
End: 2021-01-11

## 2021-01-12 ENCOUNTER — RX RENEWAL (OUTPATIENT)
Age: 71
End: 2021-01-12

## 2021-01-19 ENCOUNTER — RX RENEWAL (OUTPATIENT)
Age: 71
End: 2021-01-19

## 2021-01-29 ENCOUNTER — TRANSCRIPTION ENCOUNTER (OUTPATIENT)
Age: 71
End: 2021-01-29

## 2021-03-02 ENCOUNTER — APPOINTMENT (OUTPATIENT)
Dept: FAMILY MEDICINE | Facility: CLINIC | Age: 71
End: 2021-03-02
Payer: MEDICARE

## 2021-03-02 VITALS
HEIGHT: 63 IN | OXYGEN SATURATION: 97 % | HEART RATE: 78 BPM | DIASTOLIC BLOOD PRESSURE: 84 MMHG | SYSTOLIC BLOOD PRESSURE: 138 MMHG | BODY MASS INDEX: 37.21 KG/M2 | WEIGHT: 210 LBS | TEMPERATURE: 98.1 F

## 2021-03-02 PROCEDURE — 99214 OFFICE O/P EST MOD 30 MIN: CPT

## 2021-03-02 RX ORDER — BUSPIRONE HYDROCHLORIDE 15 MG/1
15 TABLET ORAL
Qty: 60 | Refills: 0 | Status: DISCONTINUED | COMMUNITY
Start: 2020-03-18 | End: 2021-03-02

## 2021-03-02 NOTE — HISTORY OF PRESENT ILLNESS
[de-identified] : 70 year old female is here for a followup visit\par fu for mood, pain, anxiety, insomnia\par patient with multiple issues to discuss\par  [Stopped use since ___] : No tobacco use since [unfilled] [Cigarettes ___ packs/day] : Patient smokes [unfilled] packs of cigarettes per day [___ Year(s)] : [unfilled] year(s) ago [Maintenance] : Maintenance: former smoker who stopped smoking  longer that 6 months ago but less that 5 years [Maintain commitment] : Maintain commitment [FreeTextEntry8] : \par \par

## 2021-03-02 NOTE — HEALTH RISK ASSESSMENT
[] : No [No falls in past year] : Patient reported no falls in the past year [2] : 2) Feeling down, depressed, or hopeless for more than half of the days (2) [[id number]] : 4 [[id number]] : 11 [Very Good] : ~his/her~ current health as very good [Fair] :  ~his/her~ mood as fair [Patient declined mammogram] : Patient declined mammogram [Patient declined PAP Smear] : Patient declined PAP Smear [Patient declined colonoscopy] : Patient declined colonoscopy [HIV Test offered] : HIV Test offered [Hepatitis C test offered] : Hepatitis C test offered [With Family] : lives with family [] :  [# Of Children ___] : has [unfilled] children [Fully functional (bathing, dressing, toileting, transferring, walking, feeding)] : Fully functional (bathing, dressing, toileting, transferring, walking, feeding) [Fully functional (using the telephone, shopping, preparing meals, housekeeping, doing laundry, using] : Fully functional and needs no help or supervision to perform IADLs (using the telephone, shopping, preparing meals, housekeeping, doing laundry, using transportation, managing medications and managing finances) [Smoke Detector] : smoke detector [Seat Belt] :  uses seat belt [de-identified] : lives with sister [FreeTextEntry3] : passed 2004 [With Patient/Caregiver] : With Patient/Caregiver [Designated Healthcare Proxy] : Designated healthcare proxy [Name: ___] : Health Care Proxy's Name: [unfilled]  [Relationship: ___] : Relationship: [unfilled] [AdvancecareDate] : 06/22/2020 [FreeTextEntry4] : daughter knows wishes

## 2021-03-02 NOTE — ASSESSMENT
[FreeTextEntry1] : DEPRESSION/ANXIETY\par  Discussed diagnosis of anxiety and depression with the patient and potential outcomes/side affects of treatment versus non treatment. Medications were assessed and described at length. Side affects and black box warning were discussed. Patient was advised to continue will all medications prescribed and the need for compliance was discussed and emphasized. Patient was advised to not stop medications without discussing with a health care provider first. Patient was advised to continue psychotherapy or seek therapy if not currently attending. Patient was educated on addictive potential of controlled substances and was counseled to use only as needed and sparingly. Patient verbalized understanding of all the above.  due to covid and her situation living with her sister who is very difficult, financial issues, patient cannot sleep, is always unhappy, stressed, panic attacks, long family history of psych and mood disorders prior to the current situation, patient has long standing history of depression, anxiety and insomnia, this is severe acute on chronic issues, strong family history of psychiatric disorders reinforced patient must see psychotherapist for counseling - will call and make an appointment with the number I provided  \par realizes that she does not like klonopin, worked in the beginning, did not like how it made her feel, also failed prozac, failed effexor because it stopped working, failed lexapro , at this point will continue wellbutrin, which was helping, \par increased herself to wellbutrin 450 which helped, on buspar 30 bid\par hydroxyzine as needed\par seroquel did not help at night to sleep\par  \par  FEELS ALONE, HER BROTHER IS DYING IN A PSYCH DUARTE \par  MUST STOP COMBINING PREVIOUS MEDICATIONS - ADVISED TO DISCARD PREVIOUS MEDICATIONS   major issues are depression and insomnia:  spent time discussing that some of her feelings are situational due to covid, having no money, no where to go and stuck with her sister, lost 10 friends to covid, brother is dying, unhappy, resentful of people who are happy, has strong family history of psych issues, long history of personal psych issues  \par \par INSOMNIA \par chronic, now in acute flare failed, ambien, benadryl, remeron, failed ambien CR, failed trazodone, failed tempazam, seroquel , tried klonipin 1-2 prior to bedtime, however did not like it due to morning after, failed zyprexa\par \par  CPAP\par from years of smoking,  finally aggress to maintenance inhaler \par will start advair and will reassess\par ventolin as needed\par ALLERGIES\par  singulair works a little, continue , added advair, but could not afford, gave other options,  saw pulmonologist - who also referred to cardiology prior to changing medications\par is on CPAP\par \par   HTN \par The patient has a diagnosis of hypertension.  The diagnosis was discussed with patient and need for medication compliance and possible side affects and risks of noncompliance. Patient was told to adhere to a low salt diet and try to incorporate exercise daily. monitor\par \par bilateral knee pain/chronic\par reports that she cannot get surgery because she has no one to help her at home.  mobic did not help\par patient already has had bilateral knee injections - miserable with pain, still needs to work\par using tramadol only as needed, would like a refill

## 2021-03-02 NOTE — REVIEW OF SYSTEMS
[Heartburn] : heartburn [Insomnia] : insomnia [Anxiety] : anxiety [Depression] : depression [Negative] : Heme/Lymph [FreeTextEntry9] : knee pain - bilateral and severe [de-identified] : severe/chronic

## 2021-04-12 ENCOUNTER — TRANSCRIPTION ENCOUNTER (OUTPATIENT)
Age: 71
End: 2021-04-12

## 2021-04-14 ENCOUNTER — APPOINTMENT (OUTPATIENT)
Dept: FAMILY MEDICINE | Facility: CLINIC | Age: 71
End: 2021-04-14
Payer: MEDICARE

## 2021-04-14 VITALS
OXYGEN SATURATION: 98 % | SYSTOLIC BLOOD PRESSURE: 140 MMHG | HEART RATE: 67 BPM | BODY MASS INDEX: 37.56 KG/M2 | HEIGHT: 63 IN | TEMPERATURE: 98.2 F | DIASTOLIC BLOOD PRESSURE: 80 MMHG | WEIGHT: 212 LBS

## 2021-04-14 PROCEDURE — 99215 OFFICE O/P EST HI 40 MIN: CPT

## 2021-04-14 NOTE — REVIEW OF SYSTEMS
[Insomnia] : insomnia [Anxiety] : anxiety [Depression] : depression [Negative] : Heme/Lymph [FreeTextEntry9] : knee pain - bilateral and severe [de-identified] : severe/chronic

## 2021-04-14 NOTE — HISTORY OF PRESENT ILLNESS
[Stopped use since ___] : No tobacco use since [unfilled] [Cigarettes ___ packs/day] : Patient smokes [unfilled] packs of cigarettes per day [___ Year(s)] : [unfilled] year(s) ago [Maintenance] : Maintenance: former smoker who stopped smoking  longer that 6 months ago but less that 5 years [Maintain commitment] : Maintain commitment [de-identified] : 70 year old female is here for a followup visit\par fu for mood, pain, anxiety, insomnia\par patient with multiple issues to discuss\par son has testicular cancer\par  [FreeTextEntry8] : \par \par

## 2021-04-14 NOTE — HEALTH RISK ASSESSMENT
[No] : No [No falls in past year] : Patient reported no falls in the past year [2] : 1) Little interest or pleasure doing things for more than half of the days (2) [Very Good] : ~his/her~ current health as very good [Fair] :  ~his/her~ mood as fair [Patient declined mammogram] : Patient declined mammogram [Patient declined PAP Smear] : Patient declined PAP Smear [Patient declined colonoscopy] : Patient declined colonoscopy [HIV Test offered] : HIV Test offered [Hepatitis C test offered] : Hepatitis C test offered [With Family] : lives with family [] :  [# Of Children ___] : has [unfilled] children [Fully functional (bathing, dressing, toileting, transferring, walking, feeding)] : Fully functional (bathing, dressing, toileting, transferring, walking, feeding) [Fully functional (using the telephone, shopping, preparing meals, housekeeping, doing laundry, using] : Fully functional and needs no help or supervision to perform IADLs (using the telephone, shopping, preparing meals, housekeeping, doing laundry, using transportation, managing medications and managing finances) [Smoke Detector] : smoke detector [Seat Belt] :  uses seat belt [With Patient/Caregiver] : With Patient/Caregiver [Designated Healthcare Proxy] : Designated healthcare proxy [Name: ___] : Health Care Proxy's Name: [unfilled]  [Relationship: ___] : Relationship: [unfilled] [] : No [[id number]] : 4 [[id number]] : 11 [FreeTextEntry3] : passed 2004 [de-identified] : lives with sister [FreeTextEntry4] : daughter knows wishes [AdvancecareDate] : 06/22/2020

## 2021-04-14 NOTE — ASSESSMENT
[FreeTextEntry1] : DEPRESSION/ANXIETY\par  Discussed diagnosis of anxiety and depression with the patient and potential outcomes/side affects of treatment versus non treatment. Medications were assessed and described at length. Side affects and black box warning were discussed. Patient was advised to continue will all medications prescribed and the need for compliance was discussed and emphasized. Patient was advised to not stop medications without discussing with a health care provider first. Patient was advised to continue psychotherapy or seek therapy if not currently attending. Patient was educated on addictive potential of controlled substances and was counseled to use only as needed and sparingly. Patient verbalized understanding of all the above.  due to covid and her situation living with her sister who is very difficult, financial issues, patient cannot sleep, is always unhappy, stressed, panic attacks, long family history of psych and mood disorders prior to the current situation, patient has long standing history of depression, anxiety and insomnia, this is severe acute on chronic issues, strong family history of psychiatric disorders reinforced patient must see psychotherapist for counseling - will call and make an appointment with the number I provided  \par realizes that she does not like klonopin, worked in the beginning, did not like how it made her feel, also failed prozac, failed effexor because it stopped working, failed lexapro , at this point will continue wellbutrin, which was helping, \par increased herself to wellbutrin 450 which helped, on buspar 30 bid\par hydroxyzine as needed\par seroquel did not help at night to sleep\par  \par  FEELS ALONE, HER BROTHER IS DYING IN A PSYCH DUARTE \par  son has testicular cancer, very worried\par MUST STOP COMBINING PREVIOUS MEDICATIONS - ADVISED TO DISCARD PREVIOUS MEDICATIONS   major issues are depression and insomnia:  spent time discussing that some of her feelings are situational due to covid, having no money, no where to go and stuck with her sister, lost 10 friends to covid, brother is dying, unhappy, resentful of people who are happy, has strong family history of psych issues, long history of personal psych issues  \par \par INSOMNIA \par chronic, now in acute flare failed, ambien, benadryl, remeron, failed ambien CR, failed trazodone, failed tempazam, seroquel , tried klonipin 1-2 prior to bedtime, however did not like it due to morning after, failed zyprexa\par again not sleeping, will try seroquel 100, may increase to 150 and reaseess\par \par  COPD\par from years of smoking,  finally aggress to maintenance inhaler \par started advair and is feeling much better\par ventolin as needed\par \par ALLERGIES\par  singulair works a little, continue , added advair, but could not afford, gave other options,  saw pulmonologist - who also referred to cardiology prior to changing medications\par is on CPAP\par \par   HTN \par The patient has a diagnosis of hypertension.  The diagnosis was discussed with patient and need for medication compliance and possible side affects and risks of noncompliance. Patient was told to adhere to a low salt diet and try to incorporate exercise daily. monitor\par \par bilateral knee pain/chronic\par reports that she cannot get surgery because she has no one to help her at home.  mobic did not help\par patient already has had bilateral knee injections - miserable with pain, still needs to work\par using tramadol only as needed, would like a refill

## 2021-04-20 ENCOUNTER — APPOINTMENT (OUTPATIENT)
Dept: FAMILY MEDICINE | Facility: CLINIC | Age: 71
End: 2021-04-20
Payer: MEDICARE

## 2021-04-20 ENCOUNTER — NON-APPOINTMENT (OUTPATIENT)
Age: 71
End: 2021-04-20

## 2021-04-20 PROCEDURE — 99214 OFFICE O/P EST MOD 30 MIN: CPT | Mod: 95

## 2021-04-20 NOTE — HEALTH RISK ASSESSMENT
[No] : No [No falls in past year] : Patient reported no falls in the past year [2] : 2) Feeling down, depressed, or hopeless for more than half of the days (2) [Very Good] : ~his/her~ current health as very good [Fair] :  ~his/her~ mood as fair [Patient declined mammogram] : Patient declined mammogram [Patient declined PAP Smear] : Patient declined PAP Smear [Patient declined colonoscopy] : Patient declined colonoscopy [HIV Test offered] : HIV Test offered [Hepatitis C test offered] : Hepatitis C test offered [With Family] : lives with family [] :  [# Of Children ___] : has [unfilled] children [Fully functional (bathing, dressing, toileting, transferring, walking, feeding)] : Fully functional (bathing, dressing, toileting, transferring, walking, feeding) [Fully functional (using the telephone, shopping, preparing meals, housekeeping, doing laundry, using] : Fully functional and needs no help or supervision to perform IADLs (using the telephone, shopping, preparing meals, housekeeping, doing laundry, using transportation, managing medications and managing finances) [Smoke Detector] : smoke detector [Seat Belt] :  uses seat belt [With Patient/Caregiver] : With Patient/Caregiver [Designated Healthcare Proxy] : Designated healthcare proxy [Name: ___] : Health Care Proxy's Name: [unfilled]  [Relationship: ___] : Relationship: [unfilled] [] : No [FNO4Ghiur] : 4 [XXN7Xvenu] : 11 [de-identified] : lives with sister [FreeTextEntry3] : passed 2004 [AdvancecareDate] : 06/22/2020 [FreeTextEntry4] : daughter knows wishes

## 2021-04-20 NOTE — HISTORY OF PRESENT ILLNESS
[Stopped use since ___] : No tobacco use since [unfilled] [Cigarettes ___ packs/day] : Patient smokes [unfilled] packs of cigarettes per day [___ Year(s)] : [unfilled] year(s) ago [Maintenance] : Maintenance: former smoker who stopped smoking  longer that 6 months ago but less that 5 years [Maintain commitment] : Maintain commitment [Home] : at home, [unfilled] , at the time of the visit. [Medical Office: ([facility])___] : at the medical office located in  [Verbal consent obtained from patient] : the patient, [unfilled] [de-identified] : 70 year old female is here for a followup visit\par fu for mood, pain, anxiety, insomnia\par patient with multiple issues to discuss\par son has testicular cancer\par  [FreeTextEntry8] : \par \par

## 2021-04-20 NOTE — REVIEW OF SYSTEMS
[Insomnia] : insomnia [Anxiety] : anxiety [Depression] : depression [Negative] : Heme/Lymph [FreeTextEntry9] : knee pain - bilateral and severe [de-identified] : severe/chronic

## 2021-04-20 NOTE — ASSESSMENT
[FreeTextEntry1] : DEPRESSION/ANXIETY\par  Discussed diagnosis of anxiety and depression with the patient and potential outcomes/side affects of treatment versus non treatment. Medications were assessed and described at length. Side affects and black box warning were discussed. Patient was advised to continue will all medications prescribed and the need for compliance was discussed and emphasized. Patient was advised to not stop medications without discussing with a health care provider first. Patient was advised to continue psychotherapy or seek therapy if not currently attending. Patient was educated on addictive potential of controlled substances and was counseled to use only as needed and sparingly. Patient verbalized understanding of all the above.  due to covid and her situation living with her sister who is very difficult, financial issues, patient cannot sleep, is always unhappy, stressed, panic attacks, long family history of psych and mood disorders prior to the current situation, patient has long standing history of depression, anxiety and insomnia, this is severe acute on chronic issues, strong family history of psychiatric disorders reinforced patient must see psychotherapist for counseling - will call and make an appointment with the number I provided  \par realizes that she does not like klonopin, worked in the beginning, did not like how it made her feel, also failed prozac, failed effexor because it stopped working, failed lexapro , at this point will continue wellbutrin, which was helping, \par increased herself to wellbutrin 450 which helped, on buspar 30 bid\par hydroxyzine as needed\par seroquel did not help at night to sleep\par   at 150, will try 300\par  FEELS ALONE, HER BROTHER IS DYING IN A PSYCH DUARTE \par  son has testicular cancer, very worried\par MUST STOP COMBINING PREVIOUS MEDICATIONS - ADVISED TO DISCARD PREVIOUS MEDICATIONS   major issues are depression and insomnia:  spent time discussing that some of her feelings are situational due to covid, having no money, no where to go and stuck with her sister, lost 10 friends to covid, brother is dying, unhappy, resentful of people who are happy, has strong family history of psych issues, long history of personal psych issues  \par \par INSOMNIA \par chronic, now in acute flare failed, ambien, benadryl, remeron, failed ambien CR, failed trazodone, failed tempazam, seroquel , tried klonipin 1-2 prior to bedtime, however did not like it due to morning after, failed zyprexa\par again not sleeping\par then failed seroquel 150, did 200 and slept a little, will try 300 and reassess\par \par  COPD\par from years of smoking,  finally aggress to maintenance inhaler \par started advair and is feeling much better\par ventolin as needed\par \par ALLERGIES\par  singulair works a little, continue , added advair, but could not afford, gave other options,  saw pulmonologist - who also referred to cardiology prior to changing medications\par is on CPAP\par \par   HTN \par The patient has a diagnosis of hypertension.  The diagnosis was discussed with patient and need for medication compliance and possible side affects and risks of noncompliance. Patient was told to adhere to a low salt diet and try to incorporate exercise daily. monitor\par \par bilateral knee pain/chronic\par reports that she cannot get surgery because she has no one to help her at home.  mobic did not help\par patient already has had bilateral knee injections - miserable with pain, still needs to work\par using tramadol only as needed, would like a refill, identified that she must only use as needed

## 2021-04-23 ENCOUNTER — APPOINTMENT (OUTPATIENT)
Dept: FAMILY MEDICINE | Facility: CLINIC | Age: 71
End: 2021-04-23

## 2021-04-26 ENCOUNTER — APPOINTMENT (OUTPATIENT)
Dept: FAMILY MEDICINE | Facility: CLINIC | Age: 71
End: 2021-04-26
Payer: MEDICARE

## 2021-04-26 PROCEDURE — 99442: CPT | Mod: 95

## 2021-04-26 RX ORDER — QUETIAPINE FUMARATE 100 MG/1
100 TABLET ORAL
Qty: 30 | Refills: 1 | Status: DISCONTINUED | COMMUNITY
Start: 2020-05-22 | End: 2021-04-26

## 2021-04-26 NOTE — REVIEW OF SYSTEMS
[Insomnia] : insomnia [Anxiety] : anxiety [Depression] : depression [Negative] : Heme/Lymph [FreeTextEntry9] : knee pain - bilateral and severe [de-identified] : severe/chronic

## 2021-04-26 NOTE — ASSESSMENT
[FreeTextEntry1] : DEPRESSION/ANXIETY\par  Discussed diagnosis of anxiety and depression with the patient and potential outcomes/side affects of treatment versus non treatment. Medications were assessed and described at length. Side affects and black box warning were discussed. Patient was advised to continue will all medications prescribed and the need for compliance was discussed and emphasized. Patient was advised to not stop medications without discussing with a health care provider first. Patient was advised to continue psychotherapy or seek therapy if not currently attending. Patient was educated on addictive potential of controlled substances and was counseled to use only as needed and sparingly. Patient verbalized understanding of all the above.  due to covid and her situation living with her sister who is very difficult, financial issues, patient cannot sleep, is always unhappy, stressed, panic attacks, long family history of psych and mood disorders prior to the current situation, patient has long standing history of depression, anxiety and insomnia, this is severe acute on chronic issues, strong family history of psychiatric disorders reinforced patient must see psychotherapist for counseling - will call and make an appointment with the number I provided  \par realizes that she does not like klonopin, worked in the beginning, did not like how it made her feel, also failed prozac, failed effexor because it stopped working, failed lexapro , at this point will continue wellbutrin, which was helping, \par increased herself to wellbutrin 450 which helped, on buspar 30 bid\par hydroxyzine as needed\par seroquel did not help at night to sleep\par   at 150, 200 did not work, 300 is too much - hung over all day\par reports that she used to do well on paxil, will restart and reassess, feels she is always unhappy, and wants to be\par \par 15 min\par FEELS ALONE, HER BROTHER IS DYING IN A PSYCH DUARTE \par  son has testicular cancer, very worried\par MUST STOP COMBINING PREVIOUS MEDICATIONS - ADVISED TO DISCARD PREVIOUS MEDICATIONS   major issues are depression and insomnia:  spent time discussing that some of her feelings are situational due to covid, having no money, no where to go and stuck with her sister, lost 10 friends to covid, brother is dying, unhappy, resentful of people who are happy, has strong family history of psych issues, long history of personal psych issues  \par \par INSOMNIA \par chronic, now in acute flare failed, ambien, benadryl, remeron, failed ambien CR, failed trazodone, failed tempazam, seroquel , tried klonipin 1-2 prior to bedtime, however did not like it due to morning after, failed zyprexa\par again not sleeping\par then failed seroquel 150, did 200 and slept a little, 300 was too much, does not wish to be on seroquel anymore\par \par  COPD\par from years of smoking,  finally aggress to maintenance inhaler \par started advair and is feeling much better\par ventolin as needed\par \par ALLERGIES\par  singulair works a little, continue , added advair, but could not afford, gave other options,  saw pulmonologist - who also referred to cardiology prior to changing medications\par is on CPAP\par \par   HTN \par The patient has a diagnosis of hypertension.  The diagnosis was discussed with patient and need for medication compliance and possible side affects and risks of noncompliance. Patient was told to adhere to a low salt diet and try to incorporate exercise daily. monitor\par \par bilateral knee pain/chronic\par reports that she cannot get surgery because she has no one to help her at home.  mobic did not help\par patient already has had bilateral knee injections - miserable with pain, still needs to work\par using tramadol only as needed, would like a refill, identified that she must only use as needed

## 2021-04-26 NOTE — HEALTH RISK ASSESSMENT
[] : No [No] : No [No falls in past year] : Patient reported no falls in the past year [2] : 2) Feeling down, depressed, or hopeless for more than half of the days (2) [QYE9Opiii] : 4 [LTP6Ixmkn] : 11 [Very Good] : ~his/her~ current health as very good [Fair] :  ~his/her~ mood as fair [Patient declined mammogram] : Patient declined mammogram [Patient declined PAP Smear] : Patient declined PAP Smear [Patient declined colonoscopy] : Patient declined colonoscopy [HIV Test offered] : HIV Test offered [Hepatitis C test offered] : Hepatitis C test offered [With Family] : lives with family [] :  [# Of Children ___] : has [unfilled] children [Fully functional (bathing, dressing, toileting, transferring, walking, feeding)] : Fully functional (bathing, dressing, toileting, transferring, walking, feeding) [Fully functional (using the telephone, shopping, preparing meals, housekeeping, doing laundry, using] : Fully functional and needs no help or supervision to perform IADLs (using the telephone, shopping, preparing meals, housekeeping, doing laundry, using transportation, managing medications and managing finances) [Smoke Detector] : smoke detector [Seat Belt] :  uses seat belt [FreeTextEntry3] : passed 2004 [de-identified] : lives with sister [With Patient/Caregiver] : With Patient/Caregiver [Designated Healthcare Proxy] : Designated healthcare proxy [Name: ___] : Health Care Proxy's Name: [unfilled]  [Relationship: ___] : Relationship: [unfilled] [AdvancecareDate] : 06/22/2020 [FreeTextEntry4] : daughter knows wishes

## 2021-04-26 NOTE — HISTORY OF PRESENT ILLNESS
[de-identified] : 70 year old female is here on telephone visit \par fu for mood, pain, anxiety, insomnia\par patient with multiple issues to discuss\par son has testicular cancer\par  [Home] : at home, [unfilled] , at the time of the visit. [Medical Office: ([facility])___] : at the medical office located in  [Verbal consent obtained from patient] : the patient, [unfilled] [Stopped use since ___] : No tobacco use since [unfilled] [Cigarettes ___ packs/day] : Patient smokes [unfilled] packs of cigarettes per day [___ Year(s)] : [unfilled] year(s) ago [Maintenance] : Maintenance: former smoker who stopped smoking  longer that 6 months ago but less that 5 years [Maintain commitment] : Maintain commitment [FreeTextEntry8] : \par \par

## 2021-05-17 ENCOUNTER — LABORATORY RESULT (OUTPATIENT)
Age: 71
End: 2021-05-17

## 2021-05-17 ENCOUNTER — APPOINTMENT (OUTPATIENT)
Dept: FAMILY MEDICINE | Facility: CLINIC | Age: 71
End: 2021-05-17
Payer: MEDICARE

## 2021-05-17 VITALS
WEIGHT: 220 LBS | HEART RATE: 67 BPM | BODY MASS INDEX: 38.98 KG/M2 | OXYGEN SATURATION: 98 % | DIASTOLIC BLOOD PRESSURE: 86 MMHG | RESPIRATION RATE: 16 BRPM | HEIGHT: 63 IN | SYSTOLIC BLOOD PRESSURE: 126 MMHG | TEMPERATURE: 97.5 F

## 2021-05-17 LAB
25(OH)D3 SERPL-MCNC: 25.4 NG/ML
ALBUMIN SERPL ELPH-MCNC: 4.4 G/DL
ALP BLD-CCNC: 129 U/L
ALT SERPL-CCNC: 19 U/L
ANION GAP SERPL CALC-SCNC: 10 MMOL/L
APPEARANCE: ABNORMAL
AST SERPL-CCNC: 18 U/L
BASOPHILS # BLD AUTO: 0.04 K/UL
BASOPHILS NFR BLD AUTO: 0.6 %
BILIRUB SERPL-MCNC: 0.2 MG/DL
BILIRUBIN URINE: NEGATIVE
BLOOD URINE: NEGATIVE
BUN SERPL-MCNC: 17 MG/DL
CALCIUM SERPL-MCNC: 9.4 MG/DL
CHLORIDE SERPL-SCNC: 107 MMOL/L
CHOLEST SERPL-MCNC: 209 MG/DL
CO2 SERPL-SCNC: 26 MMOL/L
COLOR: YELLOW
CREAT SERPL-MCNC: 0.91 MG/DL
EOSINOPHIL # BLD AUTO: 0.37 K/UL
EOSINOPHIL NFR BLD AUTO: 5.9 %
ESTIMATED AVERAGE GLUCOSE: 105 MG/DL
GLUCOSE QUALITATIVE U: NEGATIVE
GLUCOSE SERPL-MCNC: 85 MG/DL
HBA1C MFR BLD HPLC: 5.3 %
HCT VFR BLD CALC: 40.3 %
HDLC SERPL-MCNC: 99 MG/DL
HGB BLD-MCNC: 12.8 G/DL
IMM GRANULOCYTES NFR BLD AUTO: 0.2 %
KETONES URINE: NEGATIVE
LDLC SERPL CALC-MCNC: 96 MG/DL
LEUKOCYTE ESTERASE URINE: ABNORMAL
LYMPHOCYTES # BLD AUTO: 0.69 K/UL
LYMPHOCYTES NFR BLD AUTO: 11 %
MAN DIFF?: NORMAL
MCHC RBC-ENTMCNC: 28.7 PG
MCHC RBC-ENTMCNC: 31.8 GM/DL
MCV RBC AUTO: 90.4 FL
MONOCYTES # BLD AUTO: 0.47 K/UL
MONOCYTES NFR BLD AUTO: 7.5 %
NEUTROPHILS # BLD AUTO: 4.69 K/UL
NEUTROPHILS NFR BLD AUTO: 74.8 %
NITRITE URINE: NEGATIVE
NONHDLC SERPL-MCNC: 110 MG/DL
PH URINE: 5.5
PLATELET # BLD AUTO: 254 K/UL
POTASSIUM SERPL-SCNC: 4.1 MMOL/L
PROT SERPL-MCNC: 6.2 G/DL
PROTEIN URINE: NORMAL
RBC # BLD: 4.46 M/UL
RBC # FLD: 13.4 %
SODIUM SERPL-SCNC: 143 MMOL/L
SPECIFIC GRAVITY URINE: 1.02
TRIGL SERPL-MCNC: 73 MG/DL
TSH SERPL-ACNC: 1.87 UIU/ML
UROBILINOGEN URINE: NORMAL
WBC # FLD AUTO: 6.27 K/UL

## 2021-05-17 PROCEDURE — 99214 OFFICE O/P EST MOD 30 MIN: CPT | Mod: 25

## 2021-05-17 PROCEDURE — 36415 COLL VENOUS BLD VENIPUNCTURE: CPT

## 2021-05-17 NOTE — ASSESSMENT
[FreeTextEntry1] : DEPRESSION/ANXIETY\par  Discussed diagnosis of anxiety and depression with the patient and potential outcomes/side affects of treatment versus non treatment. Medications were assessed and described at length. Side affects and black box warning were discussed. Patient was advised to continue will all medications prescribed and the need for compliance was discussed and emphasized. Patient was advised to not stop medications without discussing with a health care provider first. Patient was advised to continue psychotherapy or seek therapy if not currently attending. Patient was educated on addictive potential of controlled substances and was counseled to use only as needed and sparingly. Patient verbalized understanding of all the above.  due to covid and her situation living with her sister who is very difficult, financial issues, patient cannot sleep, is always unhappy, stressed, panic attacks, long family history of psych and mood disorders prior to the current situation, patient has long standing history of depression, anxiety and insomnia, this is severe acute on chronic issues, strong family history of psychiatric disorders reinforced patient must see psychotherapist for counseling - will call and make an appointment with the number I provided  \par realizes that she does not like klonopin, worked in the beginning, did not like how it made her feel, also failed prozac, failed effexor because it stopped working, failed lexapro , at this point will continue wellbutrin, which was helping, \par increased herself to wellbutrin 450 which helped, on buspar 30 bid\par hydroxyzine as needed\par seroquel did not help at night to sleep\par   at 150, 200 did not work, 300 is too much - hung over all day\par now on paxil, instead of being outright depressed and sad all the time now indifferent and has good days\par now also helping with sleep\par \par \par FEELS ALONE, HER BROTHER IS DYING IN A PSYCH DUARTE \par  son has testicular cancer, very worried\par MUST STOP COMBINING PREVIOUS MEDICATIONS - ADVISED TO DISCARD PREVIOUS MEDICATIONS   major issues are depression and insomnia:  spent time discussing that some of her feelings are situational due to covid, having no money, no where to go and stuck with her sister, lost 10 friends to covid, brother is dying, unhappy, resentful of people who are happy, has strong family history of psych issues, long history of personal psych issues  \par \par INSOMNIA \par chronic, now in acute flare failed, ambien, benadryl, remeron, failed ambien CR, failed trazodone, failed tempazam, seroquel , tried klonipin 1-2 prior to bedtime, however did not like it due to morning after, failed zyprexa\par again not sleeping\par then failed seroquel 150, did 200 and slept a little, 300 was too much, does not wish to be on seroquel anymore\par  now with paxil, can sleep a little better\par   \par COPD\par from years of smoking,  finally aggress to maintenance inhaler \par started advair and is feeling much better\par ventolin as needed\par \par ALLERGIES\par  singulair works a little, continue , added advair, but could not afford, gave other options,  saw pulmonologist - who also referred to cardiology prior to changing medications\par is on CPAP\par \par   HTN \par The patient has a diagnosis of hypertension.  The diagnosis was discussed with patient and need for medication compliance and possible side affects and risks of noncompliance. Patient was told to adhere to a low salt diet and try to incorporate exercise daily. monitor\par \par bilateral knee pain/chronic\par reports that she cannot get surgery because she has no one to help her at home.  mobic did not help\par patient already has had bilateral knee injections - miserable with pain, still needs to work\par using tramadol only as needed, would like a refill, identified that she must only use as needed

## 2021-05-17 NOTE — REVIEW OF SYSTEMS
[Insomnia] : insomnia [Anxiety] : anxiety [Depression] : depression [Negative] : Heme/Lymph [FreeTextEntry9] : knee pain - bilateral and severe [de-identified] : severe/chronic

## 2021-05-17 NOTE — HEALTH RISK ASSESSMENT
[] : No [No] : No [No falls in past year] : Patient reported no falls in the past year [2] : 2) Feeling down, depressed, or hopeless for more than half of the days (2) [WSF5Sjosv] : 4 [VDR1Cgdfd] : 11 [Very Good] : ~his/her~ current health as very good [Fair] :  ~his/her~ mood as fair [Patient declined mammogram] : Patient declined mammogram [Patient declined PAP Smear] : Patient declined PAP Smear [Patient declined colonoscopy] : Patient declined colonoscopy [HIV Test offered] : HIV Test offered [Hepatitis C test offered] : Hepatitis C test offered [With Family] : lives with family [] :  [# Of Children ___] : has [unfilled] children [Fully functional (bathing, dressing, toileting, transferring, walking, feeding)] : Fully functional (bathing, dressing, toileting, transferring, walking, feeding) [Fully functional (using the telephone, shopping, preparing meals, housekeeping, doing laundry, using] : Fully functional and needs no help or supervision to perform IADLs (using the telephone, shopping, preparing meals, housekeeping, doing laundry, using transportation, managing medications and managing finances) [Smoke Detector] : smoke detector [Seat Belt] :  uses seat belt [de-identified] : lives with sister [FreeTextEntry3] : passed 2004 [With Patient/Caregiver] : With Patient/Caregiver [Designated Healthcare Proxy] : Designated healthcare proxy [Name: ___] : Health Care Proxy's Name: [unfilled]  [Relationship: ___] : Relationship: [unfilled] [AdvancecareDate] : 06/22/2020 [FreeTextEntry4] : daughter knows wishes

## 2021-05-17 NOTE — HISTORY OF PRESENT ILLNESS
[de-identified] : 70 year old female is here for a followup visit. Patient is here for medication renewals and for blood work discussion. Medications and allergies were reviewed and assessed.  \par \par fu for mood, pain, anxiety, insomnia\par patient with multiple issues to discuss\par son has testicular cancer\par  [Stopped use since ___] : No tobacco use since [unfilled] [Cigarettes ___ packs/day] : Patient smokes [unfilled] packs of cigarettes per day [___ Year(s)] : [unfilled] year(s) ago [Maintenance] : Maintenance: former smoker who stopped smoking  longer that 6 months ago but less that 5 years [Maintain commitment] : Maintain commitment [FreeTextEntry8] : \par \par

## 2021-06-28 ENCOUNTER — APPOINTMENT (OUTPATIENT)
Dept: FAMILY MEDICINE | Facility: CLINIC | Age: 71
End: 2021-06-28
Payer: MEDICARE

## 2021-06-28 VITALS
SYSTOLIC BLOOD PRESSURE: 155 MMHG | WEIGHT: 221 LBS | HEIGHT: 63 IN | TEMPERATURE: 98 F | HEART RATE: 65 BPM | BODY MASS INDEX: 39.16 KG/M2 | DIASTOLIC BLOOD PRESSURE: 90 MMHG | OXYGEN SATURATION: 98 %

## 2021-06-28 PROCEDURE — G0439: CPT

## 2021-06-28 RX ORDER — HYDROXYZINE HYDROCHLORIDE 25 MG/1
25 TABLET ORAL EVERY 8 HOURS
Qty: 90 | Refills: 0 | Status: DISCONTINUED | COMMUNITY
Start: 2021-03-02 | End: 2021-06-28

## 2021-06-28 NOTE — HISTORY OF PRESENT ILLNESS
[de-identified] : 70 year old female is here for a followup visit. Patient is here for medication renewals and for blood work discussion. Medications and allergies were reviewed and assessed.  There has been no new medications since the last visit. Patient is feeling well with no active changes or issues since Her last visit.\par \par fu for mood, pain, anxiety, insomnia\par patient with multiple issues to discuss\par son has testicular cancer\par  [Stopped use since ___] : No tobacco use since [unfilled] [Cigarettes ___ packs/day] : Patient smokes [unfilled] packs of cigarettes per day [___ Year(s)] : [unfilled] year(s) ago [Maintenance] : Maintenance: former smoker who stopped smoking  longer that 6 months ago but less that 5 years [Maintain commitment] : Maintain commitment [FreeTextEntry8] : \par \par

## 2021-06-28 NOTE — ASSESSMENT
[FreeTextEntry1] : Patient was counseled on healthy eating habits, on daily exercise and stress relief. All medications and allergies were reviewed with the patient and any adjustments necessary were made. Patient was counseled to try engage in an exercise activity for at least 30 minutes 3-4 times a week.  Patient was advised to eat a diet low in fat and carbohydrates and high in protein, with plenty of vegetables. Patient was advised to try and engage in relaxing activities whenever possible.\par \par reviewed bw with patient\par \par new tremor of right hand\par intermittent, worse with lifting \par start with orthopedist\par if no outcomes, neurologist\par \par sister is in rehab since january 2021 - calls patient multiple times a day\par \par DEPRESSION/ANXIETY\par  Discussed diagnosis of anxiety and depression with the patient and potential outcomes/side affects of treatment versus non treatment. Medications were assessed and described at length. Side affects and black box warning were discussed. Patient was advised to continue will all medications prescribed and the need for compliance was discussed and emphasized. Patient was advised to not stop medications without discussing with a health care provider first. Patient was advised to continue psychotherapy or seek therapy if not currently attending. Patient was educated on addictive potential of controlled substances and was counseled to use only as needed and sparingly. Patient verbalized understanding of all the above.  due to covid and her situation living with her sister who is very difficult, financial issues, patient cannot sleep, is always unhappy, stressed, panic attacks, long family history of psych and mood disorders prior to the current situation, patient has long standing history of depression, anxiety and insomnia, this is severe acute on chronic issues, strong family history of psychiatric disorders reinforced patient must see psychotherapist for counseling - will call and make an appointment with the number I provided  \par realizes that she does not like klonopin, worked in the beginning, did not like how it made her feel, also failed prozac, failed effexor because it stopped working, failed lexapro , at this point will continue wellbutrin, which was helping, \par increased herself to wellbutrin 450 which helped, on buspar 30 bid\par hydroxyzine as needed\par seroquel did not help at night to sleep\par   at 150, 200 did not work, 300 is too much - hung over all day\par now on paxil, instead of being outright depressed and sad all the time now indifferent and has good days\par now also helping with sleep\par \par \par FEELS ALONE, HER BROTHER IS DYING IN A PSYCH DUARTE \par  son has testicular cancer, very worried\par MUST STOP COMBINING PREVIOUS MEDICATIONS - ADVISED TO DISCARD PREVIOUS MEDICATIONS   major issues are depression and insomnia:  spent time discussing that some of her feelings are situational due to covid, having no money, no where to go and stuck with her sister, lost 10 friends to covid, brother is dying, unhappy, resentful of people who are happy, has strong family history of psych issues, long history of personal psych issues  \par \par INSOMNIA \par chronic, now in acute flare failed, ambien, benadryl, remeron, failed ambien CR, failed trazodone, failed tempazam, seroquel , tried klonipin 1-2 prior to bedtime, however did not like it due to morning after, failed zyprexa\par again not sleeping\par then failed seroquel 150, did 200 and slept a little, 300 was too much, does not wish to be on seroquel anymore\par  now with paxil, can sleep a little better\par   uses xanax as needed, does not combine\par \par COPD\par from years of smoking,  finally aggress to maintenance inhaler \par started advair and is feeling much better\par ventolin as needed\par \par ALLERGIES\par  singulair works a little, continue , added advair, but could not afford, gave other options,  saw pulmonologist - who also referred to cardiology prior to changing medications\par is on CPAP\par \par   HTN \par The patient has a diagnosis of hypertension.  The diagnosis was discussed with patient and need for medication compliance and possible side affects and risks of noncompliance. Patient was told to adhere to a low salt diet and try to incorporate exercise daily. monitor\par \par bilateral knee pain/chronic\par reports that she cannot get surgery because she has no one to help her at home.  mobic did not help\par patient already has had bilateral knee injections - miserable with pain, still needs to work\par using tramadol only as needed, would like a refill, identified that she must only use as needed

## 2021-06-28 NOTE — REVIEW OF SYSTEMS
[Insomnia] : insomnia [Anxiety] : anxiety [Depression] : depression [Negative] : Heme/Lymph [FreeTextEntry9] : knee pain - bilateral and severe [de-identified] : severe/chronic

## 2021-06-28 NOTE — PHYSICAL EXAM
[Well Nourished] : well nourished [Well Developed] : well developed [Well-Appearing] : well-appearing [Clear to Auscultation] : lungs were clear to auscultation bilaterally [Regular Rhythm] : with a regular rhythm [Normal S1, S2] : normal S1 and S2 [Normal Affect] : the affect was normal [Alert and Oriented x3] : oriented to person, place, and time [Normal Insight/Judgement] : insight and judgment were intact

## 2021-06-28 NOTE — HEALTH RISK ASSESSMENT
[Very Good] : ~his/her~ current health as very good [Fair] :  ~his/her~ mood as fair [] : No [No] : No [No falls in past year] : Patient reported no falls in the past year [2] : 2) Feeling down, depressed, or hopeless for more than half of the days (2) [[id number]] : 4 [[id number]] : 11 [Patient declined mammogram] : Patient declined mammogram [Patient declined PAP Smear] : Patient declined PAP Smear [Patient declined colonoscopy] : Patient declined colonoscopy [HIV Test offered] : HIV Test offered [Hepatitis C test offered] : Hepatitis C test offered [With Family] : lives with family [] :  [# Of Children ___] : has [unfilled] children [Fully functional (bathing, dressing, toileting, transferring, walking, feeding)] : Fully functional (bathing, dressing, toileting, transferring, walking, feeding) [Fully functional (using the telephone, shopping, preparing meals, housekeeping, doing laundry, using] : Fully functional and needs no help or supervision to perform IADLs (using the telephone, shopping, preparing meals, housekeeping, doing laundry, using transportation, managing medications and managing finances) [Smoke Detector] : smoke detector [Seat Belt] :  uses seat belt [de-identified] : lives with sister [FreeTextEntry3] : passed 2004 [With Patient/Caregiver] : With Patient/Caregiver [Designated Healthcare Proxy] : Designated healthcare proxy [Name: ___] : Health Care Proxy's Name: [unfilled]  [Relationship: ___] : Relationship: [unfilled] [AdvancecareDate] : 06/22/2020 [FreeTextEntry4] : daughter knows wishes

## 2021-07-01 ENCOUNTER — RX RENEWAL (OUTPATIENT)
Age: 71
End: 2021-07-01

## 2021-07-25 ENCOUNTER — RX RENEWAL (OUTPATIENT)
Age: 71
End: 2021-07-25

## 2021-07-28 ENCOUNTER — TRANSCRIPTION ENCOUNTER (OUTPATIENT)
Age: 71
End: 2021-07-28

## 2021-07-29 ENCOUNTER — TRANSCRIPTION ENCOUNTER (OUTPATIENT)
Age: 71
End: 2021-07-29

## 2021-08-06 ENCOUNTER — RX RENEWAL (OUTPATIENT)
Age: 71
End: 2021-08-06

## 2021-08-25 ENCOUNTER — APPOINTMENT (OUTPATIENT)
Dept: FAMILY MEDICINE | Facility: CLINIC | Age: 71
End: 2021-08-25
Payer: MEDICARE

## 2021-08-25 PROCEDURE — 99213 OFFICE O/P EST LOW 20 MIN: CPT

## 2021-08-25 RX ORDER — PAROXETINE HYDROCHLORIDE 20 MG/1
20 TABLET, FILM COATED ORAL
Qty: 30 | Refills: 2 | Status: DISCONTINUED | COMMUNITY
Start: 2021-04-26 | End: 2021-08-25

## 2021-08-29 NOTE — HISTORY OF PRESENT ILLNESS
[Home] : at home, [unfilled] , at the time of the visit. [Medical Office: ([facility])___] : at the medical office located in  [Verbal consent obtained from patient] : the patient, [unfilled] [de-identified] : follow-up on medications

## 2021-09-01 ENCOUNTER — APPOINTMENT (OUTPATIENT)
Dept: FAMILY MEDICINE | Facility: CLINIC | Age: 71
End: 2021-09-01
Payer: MEDICARE

## 2021-09-01 VITALS
WEIGHT: 222 LBS | OXYGEN SATURATION: 98 % | HEIGHT: 63 IN | DIASTOLIC BLOOD PRESSURE: 80 MMHG | RESPIRATION RATE: 16 BRPM | SYSTOLIC BLOOD PRESSURE: 140 MMHG | HEART RATE: 76 BPM | TEMPERATURE: 98 F | BODY MASS INDEX: 39.34 KG/M2

## 2021-09-01 PROCEDURE — 99214 OFFICE O/P EST MOD 30 MIN: CPT

## 2021-09-01 RX ORDER — ALPRAZOLAM 0.5 MG/1
0.5 TABLET ORAL
Qty: 60 | Refills: 0 | Status: DISCONTINUED | COMMUNITY
Start: 2021-08-06 | End: 2021-09-01

## 2021-09-01 NOTE — ASSESSMENT
[FreeTextEntry1] : \par \par reviewed bw with patient\par \par new tremor of right hand\par stopped when she stopped paxil\par \par sister is in rehab since 2021 - calls patient multiple times a day\par \par OBESITY\par The diagnosis of obesity was discussed with the patient. The patient was counseled on diet and exercise. Patient was advised to eat a diet low in carbohydrates and low in fat with high protein diet with plenty of vegetables. Patient was counseled to eat small meals throughout the day avoiding carbohydrates, foods high in fat, foods that are fried and fast food.  Patient was advised to monitor fluid intake, to drink at least 8 glasses of pure water a day and reduce/stop intake of all sugar drinks including juice and soda. Patient was advised to try and exercise for 30-40 minutes per day for at least three days a week. Pros and cons of using medical management for weight loss versus diet/exercise alone was discussed. Medication options were provided for the patient and side affects and risks were identified and discussed.  Patient was advised to take any medications as prescribed and to call office or go to the ER immediately if any issues with the medications occur. All questions were answered.\par patient admits to emotional eating, fast food, cookies, junk food\par \par DEPRESSION/ANXIETY\par  Discussed diagnosis of anxiety and depression with the patient and potential outcomes/side affects of treatment versus non treatment. Medications were assessed and described at length. Side affects and black box warning were discussed. Patient was advised to continue will all medications prescribed and the need for compliance was discussed and emphasized. Patient was advised to not stop medications without discussing with a health care provider first. Patient was advised to continue psychotherapy or seek therapy if not currently attending. Patient was educated on addictive potential of controlled substances and was counseled to use only as needed and sparingly. Patient verbalized understanding of all the above.  due to covid and her situation living with her sister who is very difficult, financial issues, patient cannot sleep, is always unhappy, stressed, panic attacks, long family history of psych and mood disorders prior to the current situation, patient has long standing history of depression, anxiety and insomnia, this is severe acute on chronic issues, strong family history of psychiatric disorders reinforced patient must see psychotherapist for counseling - will call and make an appointment with the number I provided  \par realizes that she does not like klonopin, worked in the beginning, did not like how it made her feel, also failed prozac, failed effexor because it stopped working, failed lexapro , at this point will continue wellbutrin, which was helping, \par increased herself to wellbutrin 450 which helped, on buspar 30 bid\par hydroxyzine as needed\par seroquel did not help at night to sleep\par   at 150, 200 did not work, 300 is too much - hung over all day\par stopped paxil, tremors stopped, now on zoloft 50\par \par suggest in patient evaluation, refuses at this time\par \par \par FEELS ALONE, HER BROTHER IS DYING IN A PSYCH DUARTE \par  SISTER  OF [place]  AFTER HOSPITAL STAY IN PATIENTS APARTMENT\par son has testicular cancer, very worried\par MUST STOP COMBINING PREVIOUS MEDICATIONS - ADVISED TO DISCARD PREVIOUS MEDICATIONS   major issues are depression and insomnia:  spent time discussing that some of her feelings are situational due to covid, having no money, no where to go and stuck with her sister, lost 10 friends to covid, brother is dying, unhappy, resentful of people who are happy, has strong family history of psych issues, long history of personal psych issues  \par \par INSOMNIA \par chronic, now in acute flare failed, ambien, benadryl, remeron, failed ambien CR, failed trazodone, failed tempazam, seroquel , tried klonipin 1-2 prior to bedtime, however did not like it due to morning after, failed zyprexa\par again not sleeping\par then failed seroquel 150, did 200 and slept a little, 300 was too much, does not wish to be on seroquel anymore\par  now with paxil, can sleep a little better\par   uses xanax as needed, does not combine\par \par COPD\par from years of smoking,  finally aggress to maintenance inhaler \par started advair and is feeling much better\par ventolin as needed\par \par ALLERGIES\par  singulair works a little, continue , added advair, but could not afford, gave other options,  saw pulmonologist - who also referred to cardiology prior to changing medications\par is on CPAP\par \par   HTN \par The patient has a diagnosis of hypertension.  The diagnosis was discussed with patient and need for medication compliance and possible side affects and risks of noncompliance. Patient was told to adhere to a low salt diet and try to incorporate exercise daily. monitor\par \par bilateral knee pain/chronic\par reports that she cannot get surgery because she has no one to help her at home.  mobic did not help\par patient already has had bilateral knee injections - miserable with pain, still needs to work\par using tramadol only as needed, would like a refill, identified that she must only use as needed\par needs to lose 30 pounds in order for surgeon to do surgery

## 2021-09-01 NOTE — HISTORY OF PRESENT ILLNESS
[Stopped use since ___] : No tobacco use since [unfilled] [Cigarettes ___ packs/day] : Patient smokes [unfilled] packs of cigarettes per day [___ Year(s)] : [unfilled] year(s) ago [Maintenance] : Maintenance: former smoker who stopped smoking  longer that 6 months ago but less that 5 years [Maintain commitment] : Maintain commitment [de-identified] : 70 year old female is here for a followup visit. Patient is here for medication renewals and for blood work discussion. Medications and allergies were reviewed and assessed.  There has been no new medications since the last visit. Patient is feeling well with no active changes or issues since Her last visit.\par \par fu for mood, pain, anxiety, insomnia\par patient with multiple issues to discuss\par son has testicular cancer\par lost sister 08/2021 from [place] after hospital stay\par  [FreeTextEntry8] : \par \par

## 2021-09-01 NOTE — REVIEW OF SYSTEMS
[Insomnia] : insomnia [Anxiety] : anxiety [Depression] : depression [Negative] : Heme/Lymph [FreeTextEntry9] : knee pain - bilateral and severe [de-identified] : severe/chronic

## 2021-09-01 NOTE — HEALTH RISK ASSESSMENT
[No] : No [No falls in past year] : Patient reported no falls in the past year [2] : 2) Feeling down, depressed, or hopeless for more than half of the days (2) [Very Good] : ~his/her~ current health as very good [Fair] :  ~his/her~ mood as fair [Patient declined mammogram] : Patient declined mammogram [Patient declined PAP Smear] : Patient declined PAP Smear [Patient declined colonoscopy] : Patient declined colonoscopy [HIV Test offered] : HIV Test offered [Hepatitis C test offered] : Hepatitis C test offered [With Family] : lives with family [] :  [# Of Children ___] : has [unfilled] children [Fully functional (bathing, dressing, toileting, transferring, walking, feeding)] : Fully functional (bathing, dressing, toileting, transferring, walking, feeding) [Fully functional (using the telephone, shopping, preparing meals, housekeeping, doing laundry, using] : Fully functional and needs no help or supervision to perform IADLs (using the telephone, shopping, preparing meals, housekeeping, doing laundry, using transportation, managing medications and managing finances) [Smoke Detector] : smoke detector [Seat Belt] :  uses seat belt [] : No [[id number]] : 4 [de-identified] : lives with sister [FreeTextEntry3] : passed 2004

## 2021-11-14 NOTE — COUNSELING
[None] : None [Good understanding] : Patient has a good understanding of lifestyle changes and the steps needed to achieve self management goals There are no Wet Read(s) to document.

## 2021-12-06 ENCOUNTER — APPOINTMENT (OUTPATIENT)
Dept: FAMILY MEDICINE | Facility: CLINIC | Age: 71
End: 2021-12-06

## 2021-12-06 ENCOUNTER — TRANSCRIPTION ENCOUNTER (OUTPATIENT)
Age: 71
End: 2021-12-06

## 2022-01-24 ENCOUNTER — APPOINTMENT (OUTPATIENT)
Dept: FAMILY MEDICINE | Facility: CLINIC | Age: 72
End: 2022-01-24
Payer: MEDICARE

## 2022-01-24 VITALS
DIASTOLIC BLOOD PRESSURE: 82 MMHG | HEART RATE: 70 BPM | SYSTOLIC BLOOD PRESSURE: 120 MMHG | OXYGEN SATURATION: 96 % | WEIGHT: 226 LBS | BODY MASS INDEX: 40.04 KG/M2 | TEMPERATURE: 98.6 F | HEIGHT: 63 IN

## 2022-01-24 PROCEDURE — 99214 OFFICE O/P EST MOD 30 MIN: CPT

## 2022-01-24 RX ORDER — IPRATROPIUM BROMIDE 42 UG/1
0.06 SPRAY NASAL
Qty: 15 | Refills: 0 | Status: COMPLETED | COMMUNITY
Start: 2021-12-06

## 2022-01-24 RX ORDER — BENZONATATE 200 MG/1
200 CAPSULE ORAL
Qty: 21 | Refills: 0 | Status: COMPLETED | COMMUNITY
Start: 2021-12-06

## 2022-01-24 NOTE — HEALTH RISK ASSESSMENT
[Former] : Former [No] : No [No falls in past year] : Patient reported no falls in the past year [2] : 2) Feeling down, depressed, or hopeless for more than half of the days (2) [PHQ-9 Positive] : PHQ-9 Positive [I have developed a follow-up plan documented below in the note.] : I have developed a follow-up plan documented below in the note. [[id number]] : 4 [Very Good] : ~his/her~ current health as very good [Fair] :  ~his/her~ mood as fair [Patient declined mammogram] : Patient declined mammogram [Patient declined PAP Smear] : Patient declined PAP Smear [Patient declined colonoscopy] : Patient declined colonoscopy [HIV Test offered] : HIV Test offered [Hepatitis C test offered] : Hepatitis C test offered [With Family] : lives with family [] :  [# Of Children ___] : has [unfilled] children [Fully functional (bathing, dressing, toileting, transferring, walking, feeding)] : Fully functional (bathing, dressing, toileting, transferring, walking, feeding) [Fully functional (using the telephone, shopping, preparing meals, housekeeping, doing laundry, using] : Fully functional and needs no help or supervision to perform IADLs (using the telephone, shopping, preparing meals, housekeeping, doing laundry, using transportation, managing medications and managing finances) [Smoke Detector] : smoke detector [Seat Belt] :  uses seat belt [de-identified] : lives with sister [FreeTextEntry3] : passed 2004

## 2022-01-24 NOTE — REVIEW OF SYSTEMS
[Insomnia] : insomnia [Anxiety] : anxiety [Depression] : depression [Negative] : Heme/Lymph [FreeTextEntry9] : knee pain - bilateral and severe [de-identified] : severe/chronic

## 2022-01-24 NOTE — HISTORY OF PRESENT ILLNESS
[de-identified] : 71 year old female is here for a followup visit. Patient is here for medication renewals and for blood work discussion. Medications and allergies were reviewed and assessed.  There has been no new medications since the last visit. Patient is feeling well with no active changes or issues since Her last visit.\par \par fu for mood, pain, anxiety, insomnia\par patient with multiple issues to discuss\par son has testicular cancer\par lost sister 08/2021 from [place] after hospital stay\par  [Stopped use since ___] : No tobacco use since [unfilled] [Cigarettes ___ packs/day] : Patient smokes [unfilled] packs of cigarettes per day [___ Year(s)] : [unfilled] year(s) ago [Maintenance] : Maintenance: former smoker who stopped smoking  longer that 6 months ago but less that 5 years [Maintain commitment] : Maintain commitment [FreeTextEntry8] : \par \par

## 2022-01-24 NOTE — ASSESSMENT
[FreeTextEntry1] : \par \par reviewed bw with patient\par \par  tremor of right hand\par stopped when she stopped paxil\par \par lost sister 2021 from [place] after hospital stay\par son with testicular cancer\par \par OBESITY\par The diagnosis of obesity was discussed with the patient. The patient was counseled on diet and exercise. Patient was advised to eat a diet low in carbohydrates and low in fat with high protein diet with plenty of vegetables. Patient was counseled to eat small meals throughout the day avoiding carbohydrates, foods high in fat, foods that are fried and fast food.  Patient was advised to monitor fluid intake, to drink at least 8 glasses of pure water a day and reduce/stop intake of all sugar drinks including juice and soda. Patient was advised to try and exercise for 30-40 minutes per day for at least three days a week. Pros and cons of using medical management for weight loss versus diet/exercise alone was discussed. Medication options were provided for the patient and side affects and risks were identified and discussed.  Patient was advised to take any medications as prescribed and to call office or go to the ER immediately if any issues with the medications occur. All questions were answered.\par patient admits to emotional eating, fast food, cookies, junk food\par \par DEPRESSION/ANXIETY\par  Discussed diagnosis of anxiety and depression with the patient and potential outcomes/side affects of treatment versus non treatment. Medications were assessed and described at length. Side affects and black box warning were discussed. Patient was advised to continue will all medications prescribed and the need for compliance was discussed and emphasized. Patient was advised to not stop medications without discussing with a health care provider first. Patient was advised to continue psychotherapy or seek therapy if not currently attending. Patient was educated on addictive potential of controlled substances and was counseled to use only as needed and sparingly. Patient verbalized understanding of all the above.  due to covid and her situation living with her sister who is very difficult, financial issues, patient cannot sleep, is always unhappy, stressed, panic attacks, long family history of psych and mood disorders prior to the current situation, patient has long standing history of depression, anxiety and insomnia, this is severe acute on chronic issues, strong family history of psychiatric disorders reinforced patient must see psychotherapist for counseling - will call and make an appointment with the number I provided  \par realizes that she does not like klonopin, worked in the beginning, did not like how it made her feel, also failed prozac, failed effexor because it stopped working, failed lexapro , at this point will continue wellbutrin, which was helping, \par increased herself to wellbutrin 450 which helped, on buspar 30 bid\par hydroxyzine as needed\par seroquel did not help at night to sleep\par  trazodone at 150, 200 did not work, 300 is too much - hung over all day\par stopped paxil, tremors stopped, now on zoloft 50\par \par suggest in patient evaluation, refuses at this time\par \par FEELS ALONE, HER BROTHER IS DYING IN A PSYCH DUARTE \par  SISTER  OF [place]  AFTER HOSPITAL STAY IN PATIENTS APARTMENT \par son has testicular cancer, very worried\par MUST STOP COMBINING PREVIOUS MEDICATIONS - ADVISED TO DISCARD PREVIOUS MEDICATIONS   major issues are depression and insomnia:  spent time discussing that some of her feelings are situational due to covid, having no money, no where to go and stuck with her sister, lost 10 friends to covid, brother is dying, unhappy, resentful of people who are happy, has strong family history of psych issues, long history of personal psych issues  \par \par 21 - had three children, one who helps with finances, son [place], other daughter cant help financially, \par depressed, sad, unmotivated, unhappy, wants to watch tv all day, no interested in job or anything, feels alone, will increase zoloft to 100\par \par INSOMNIA \par chronic, now in acute flare failed, ambien, benadryl, remeron, failed ambien CR, failed trazodone, failed tempazam, seroquel , tried klonipin 1-2 prior to bedtime, however did not like it due to morning after, failed zyprexa\par again not sleeping\par then failed seroquel 150, did 200 and slept a little, 300 was too much, does not wish to be on seroquel anymore\par  now with paxil, can sleep a little better\par   uses xanax as needed, does not combine\par 22 - ambien was helping, no longer, will try seroquel again\par \par COPD\par from years of smoking,  finally aggress to maintenance inhaler \par started advair and is feeling much better\par ventolin as needed\par \par ALLERGIES\par  singulair works a little, continue , added advair, but could not afford, gave other options,  saw pulmonologist - who also referred to cardiology prior to changing medications\par is on CPAP\par \par   HTN \par The patient has a diagnosis of hypertension.  The diagnosis was discussed with patient and need for medication compliance and possible side affects and risks of noncompliance. Patient was told to adhere to a low salt diet and try to incorporate exercise daily. monitor\par \par bilateral knee pain/chronic\par reports that she cannot get surgery because she has no one to help her at home.  mobic did not help\par patient already has had bilateral knee injections - miserable with pain, still needs to work\par using tramadol only as needed, would like a refill, identified that she must only use as needed\par needs to lose 30 pounds in order for surgeon to do surgery\par give tramadol prn

## 2022-02-08 ENCOUNTER — TRANSCRIPTION ENCOUNTER (OUTPATIENT)
Age: 72
End: 2022-02-08

## 2022-02-14 RX ORDER — QUETIAPINE FUMARATE 50 MG/1
50 TABLET ORAL
Qty: 30 | Refills: 3 | Status: DISCONTINUED | COMMUNITY
Start: 2020-07-20 | End: 2022-02-14

## 2022-02-15 ENCOUNTER — APPOINTMENT (OUTPATIENT)
Dept: FAMILY MEDICINE | Facility: CLINIC | Age: 72
End: 2022-02-15

## 2022-03-07 ENCOUNTER — TRANSCRIPTION ENCOUNTER (OUTPATIENT)
Age: 72
End: 2022-03-07

## 2022-03-09 ENCOUNTER — APPOINTMENT (OUTPATIENT)
Dept: FAMILY MEDICINE | Facility: CLINIC | Age: 72
End: 2022-03-09
Payer: MEDICARE

## 2022-03-09 ENCOUNTER — LABORATORY RESULT (OUTPATIENT)
Age: 72
End: 2022-03-09

## 2022-03-09 VITALS
HEART RATE: 85 BPM | BODY MASS INDEX: 40.04 KG/M2 | HEIGHT: 63 IN | TEMPERATURE: 98 F | SYSTOLIC BLOOD PRESSURE: 140 MMHG | DIASTOLIC BLOOD PRESSURE: 80 MMHG | OXYGEN SATURATION: 98 % | WEIGHT: 226 LBS

## 2022-03-09 LAB
ALBUMIN SERPL ELPH-MCNC: 5 G/DL
ALP BLD-CCNC: 135 U/L
ALT SERPL-CCNC: 17 U/L
ANION GAP SERPL CALC-SCNC: 15 MMOL/L
APPEARANCE: CLEAR
AST SERPL-CCNC: 18 U/L
BASOPHILS # BLD AUTO: 0.01 K/UL
BASOPHILS NFR BLD AUTO: 0.2 %
BILIRUB SERPL-MCNC: 0.2 MG/DL
BILIRUBIN URINE: NEGATIVE
BLOOD URINE: NEGATIVE
BUN SERPL-MCNC: 18 MG/DL
CALCIUM SERPL-MCNC: 10 MG/DL
CHLORIDE SERPL-SCNC: 106 MMOL/L
CHOLEST SERPL-MCNC: 243 MG/DL
CO2 SERPL-SCNC: 22 MMOL/L
COLOR: NORMAL
CREAT SERPL-MCNC: 0.86 MG/DL
EGFR: 72 ML/MIN/1.73M2
EOSINOPHIL # BLD AUTO: 0 K/UL
EOSINOPHIL NFR BLD AUTO: 0 %
ESTIMATED AVERAGE GLUCOSE: 100 MG/DL
GLUCOSE QUALITATIVE U: NEGATIVE
GLUCOSE SERPL-MCNC: 138 MG/DL
HBA1C MFR BLD HPLC: 5.1 %
HCT VFR BLD CALC: 41.1 %
HDLC SERPL-MCNC: 92 MG/DL
HGB BLD-MCNC: 13.1 G/DL
IMM GRANULOCYTES NFR BLD AUTO: 0.3 %
KETONES URINE: NEGATIVE
LDLC SERPL CALC-MCNC: 137 MG/DL
LEUKOCYTE ESTERASE URINE: NEGATIVE
LYMPHOCYTES # BLD AUTO: 0.46 K/UL
LYMPHOCYTES NFR BLD AUTO: 7.6 %
MAN DIFF?: NORMAL
MCHC RBC-ENTMCNC: 29 PG
MCHC RBC-ENTMCNC: 31.9 GM/DL
MCV RBC AUTO: 91.1 FL
MONOCYTES # BLD AUTO: 0.29 K/UL
MONOCYTES NFR BLD AUTO: 4.8 %
NEUTROPHILS # BLD AUTO: 5.29 K/UL
NEUTROPHILS NFR BLD AUTO: 87.1 %
NITRITE URINE: NEGATIVE
NONHDLC SERPL-MCNC: 151 MG/DL
PH URINE: 6.5
PLATELET # BLD AUTO: 374 K/UL
POTASSIUM SERPL-SCNC: 4.1 MMOL/L
PROT SERPL-MCNC: 6.9 G/DL
PROTEIN URINE: NORMAL
RBC # BLD: 4.51 M/UL
RBC # FLD: 12.7 %
SODIUM SERPL-SCNC: 144 MMOL/L
SPECIFIC GRAVITY URINE: 1.02
TRIGL SERPL-MCNC: 73 MG/DL
TSH SERPL-ACNC: 0.97 UIU/ML
UROBILINOGEN URINE: NORMAL
WBC # FLD AUTO: 6.07 K/UL

## 2022-03-09 PROCEDURE — 99214 OFFICE O/P EST MOD 30 MIN: CPT | Mod: 25

## 2022-03-09 PROCEDURE — 36415 COLL VENOUS BLD VENIPUNCTURE: CPT

## 2022-03-09 NOTE — REVIEW OF SYSTEMS
[Insomnia] : insomnia [Anxiety] : anxiety [Depression] : depression [Negative] : Heme/Lymph [FreeTextEntry9] : knee pain - bilateral and severe [de-identified] : unbalance [de-identified] : severe/chronic

## 2022-03-09 NOTE — ASSESSMENT
[FreeTextEntry1] : balance issues/history of mini strokes\par refer to neuro, balance issues\par patient fell, fell forward, needs rolling walker for now until evaluated\par has rolling walker at home, will start to use\par shuffling, PD? \par \par  tremor of right hand\par stopped when she stopped paxil\par \par lost sister 2021 from [place] after hospital stay\par son with testicular cancer\par \par OBESITY\par The diagnosis of obesity was discussed with the patient. The patient was counseled on diet and exercise. Patient was advised to eat a diet low in carbohydrates and low in fat with high protein diet with plenty of vegetables. Patient was counseled to eat small meals throughout the day avoiding carbohydrates, foods high in fat, foods that are fried and fast food.  Patient was advised to monitor fluid intake, to drink at least 8 glasses of pure water a day and reduce/stop intake of all sugar drinks including juice and soda. Patient was advised to try and exercise for 30-40 minutes per day for at least three days a week. Pros and cons of using medical management for weight loss versus diet/exercise alone was discussed. Medication options were provided for the patient and side affects and risks were identified and discussed.  Patient was advised to take any medications as prescribed and to call office or go to the ER immediately if any issues with the medications occur. All questions were answered.\par patient admits to emotional eating, fast food, cookies, junk food\par \par DEPRESSION/ANXIETY\par  Discussed diagnosis of anxiety and depression with the patient and potential outcomes/side affects of treatment versus non treatment. Medications were assessed and described at length. Side affects and black box warning were discussed. Patient was advised to continue will all medications prescribed and the need for compliance was discussed and emphasized. Patient was advised to not stop medications without discussing with a health care provider first. Patient was advised to continue psychotherapy or seek therapy if not currently attending. Patient was educated on addictive potential of controlled substances and was counseled to use only as needed and sparingly. Patient verbalized understanding of all the above.  due to covid and her situation living with her sister who is very difficult, financial issues, patient cannot sleep, is always unhappy, stressed, panic attacks, long family history of psych and mood disorders prior to the current situation, patient has long standing history of depression, anxiety and insomnia, this is severe acute on chronic issues, strong family history of psychiatric disorders reinforced patient must see psychotherapist for counseling - will call and make an appointment with the number I provided  \par realizes that she does not like klonopin, worked in the beginning, did not like how it made her feel, also failed prozac, failed effexor because it stopped working, failed lexapro , at this point will continue wellbutrin, which was helping, \par increased herself to wellbutrin 450 which helped, on buspar 30 bid\par hydroxyzine as needed\par seroquel did not help at night to sleep\par  trazodone at 150, 200 did not work, 300 is too much - hung over all day\par stopped paxil, tremors stopped, now on zoloft 50\par \par suggest in patient evaluation, refuses at this time\par \par FEELS ALONE, HER BROTHER IS DYING IN A PSYCH DUARTE \par  SISTER  OF MI  AFTER HOSPITAL STAY IN PATIENTS APARTMENT \par son has testicular cancer, very worried\par MUST STOP COMBINING PREVIOUS MEDICATIONS - ADVISED TO DISCARD PREVIOUS MEDICATIONS   major issues are depression and insomnia:  spent time discussing that some of her feelings are situational due to covid, having no money, no where to go and stuck with her sister, lost 10 friends to covid, brother is dying, unhappy, resentful of people who are happy, has strong family history of psych issues, long history of personal psych issues  \par \par 21 - had three children, one who helps with finances, son out [place], other daughter cant help financially, \par depressed, sad, unmotivated, unhappy, wants to watch tv all day, no interested in job or anything, feels alone, will increase zoloft to 100\par \par INSOMNIA \par chronic, now in acute flare failed, ambien, benadryl, remeron, failed ambien CR, failed trazodone, failed tempazam, seroquel , tried klonipin 1-2 prior to bedtime, however did not like it due to morning after, failed zyprexa\par again not sleeping\par then failed seroquel 150, did 200 and slept a little, 300 was too much, does not wish to be on seroquel anymore\par  now with paxil, can sleep a little better\par   uses xanax as needed, does not combine\par 21 - ambien was helping, no longer, will try seroquel again\par \par COPD\par from years of smoking,  finally aggress to maintenance inhaler \par started advair and is feeling much better\par ventolin as needed\par \par ALLERGIES\par  singulair works a little, continue , added advair, but could not afford, gave other options,  saw pulmonologist - who also referred to cardiology prior to changing medications\par is on CPAP\par \par   HTN \par The patient has a diagnosis of hypertension.  The diagnosis was discussed with patient and need for medication compliance and possible side affects and risks of noncompliance. Patient was told to adhere to a low salt diet and try to incorporate exercise daily. monitor\par \par bilateral knee pain/chronic\par reports that she cannot get surgery because she has no one to help her at home.  mobic did not help\par patient already has had bilateral knee injections - miserable with pain, still needs to work\par using tramadol only as needed, would like a refill, identified that she must only use as needed\par needs to lose 30 pounds in order for surgeon to do surgery\par give tramadol prn

## 2022-03-09 NOTE — HISTORY OF PRESENT ILLNESS
[de-identified] : 71 year old female is here for a followup visit. Patient is here for medication renewals and for blood work discussion. Medications and allergies were reviewed and assessed.  There has been no new medications since the last visit. Patient is feeling well with no active changes or issues since Her last visit.\par \par fu for mood, pain, anxiety, insomnia, balance issues\par patient with multiple issues to discuss\par son has testicular cancer\par lost sister 08/2021 from [place] after hospital stay\par  [Stopped use since ___] : No tobacco use since [unfilled] [Cigarettes ___ packs/day] : Patient smokes [unfilled] packs of cigarettes per day [___ Year(s)] : [unfilled] year(s) ago [Maintenance] : Maintenance: former smoker who stopped smoking  longer that 6 months ago but less that 5 years [Maintain commitment] : Maintain commitment [FreeTextEntry8] : \par \par

## 2022-03-09 NOTE — HEALTH RISK ASSESSMENT
[Former] : Former [No] : No [No falls in past year] : Patient reported no falls in the past year [2] : 2) Feeling down, depressed, or hopeless for more than half of the days (2) [PHQ-9 Positive] : PHQ-9 Positive [I have developed a follow-up plan documented below in the note.] : I have developed a follow-up plan documented below in the note. [[id number]] : 4 [Very Good] : ~his/her~ current health as very good [Fair] :  ~his/her~ mood as fair [Patient declined mammogram] : Patient declined mammogram [Patient declined PAP Smear] : Patient declined PAP Smear [Patient declined colonoscopy] : Patient declined colonoscopy [HIV Test offered] : HIV Test offered [Hepatitis C test offered] : Hepatitis C test offered [With Family] : lives with family [] :  [# Of Children ___] : has [unfilled] children [Fully functional (bathing, dressing, toileting, transferring, walking, feeding)] : Fully functional (bathing, dressing, toileting, transferring, walking, feeding) [Fully functional (using the telephone, shopping, preparing meals, housekeeping, doing laundry, using] : Fully functional and needs no help or supervision to perform IADLs (using the telephone, shopping, preparing meals, housekeeping, doing laundry, using transportation, managing medications and managing finances) [Smoke Detector] : smoke detector [Seat Belt] :  uses seat belt [de-identified] : lives with sister [FreeTextEntry3] : passed 2004

## 2022-03-22 ENCOUNTER — TRANSCRIPTION ENCOUNTER (OUTPATIENT)
Age: 72
End: 2022-03-22

## 2022-03-23 ENCOUNTER — RX RENEWAL (OUTPATIENT)
Age: 72
End: 2022-03-23

## 2022-03-24 ENCOUNTER — APPOINTMENT (OUTPATIENT)
Dept: FAMILY MEDICINE | Facility: CLINIC | Age: 72
End: 2022-03-24
Payer: MEDICARE

## 2022-03-24 PROCEDURE — 99442: CPT | Mod: CS,95

## 2022-03-27 ENCOUNTER — TRANSCRIPTION ENCOUNTER (OUTPATIENT)
Age: 72
End: 2022-03-27

## 2022-04-06 ENCOUNTER — TRANSCRIPTION ENCOUNTER (OUTPATIENT)
Age: 72
End: 2022-04-06

## 2022-04-07 ENCOUNTER — TRANSCRIPTION ENCOUNTER (OUTPATIENT)
Age: 72
End: 2022-04-07

## 2022-04-11 PROBLEM — Z11.59 SCREENING FOR VIRAL DISEASE: Status: ACTIVE | Noted: 2020-06-22

## 2022-04-19 ENCOUNTER — APPOINTMENT (OUTPATIENT)
Dept: ORTHOPEDIC SURGERY | Facility: CLINIC | Age: 72
End: 2022-04-19

## 2022-04-30 ENCOUNTER — RX RENEWAL (OUTPATIENT)
Age: 72
End: 2022-04-30

## 2022-05-10 ENCOUNTER — APPOINTMENT (OUTPATIENT)
Dept: INTERNAL MEDICINE | Facility: CLINIC | Age: 72
End: 2022-05-10
Payer: MEDICARE

## 2022-05-10 VITALS
SYSTOLIC BLOOD PRESSURE: 175 MMHG | BODY MASS INDEX: 39.34 KG/M2 | HEIGHT: 63 IN | HEART RATE: 72 BPM | WEIGHT: 222 LBS | OXYGEN SATURATION: 94 % | DIASTOLIC BLOOD PRESSURE: 82 MMHG | TEMPERATURE: 98 F

## 2022-05-10 VITALS — SYSTOLIC BLOOD PRESSURE: 155 MMHG | DIASTOLIC BLOOD PRESSURE: 90 MMHG

## 2022-05-10 PROCEDURE — 99496 TRANSJ CARE MGMT HIGH F2F 7D: CPT

## 2022-05-11 NOTE — PLAN
[FreeTextEntry1] : Hospital d/c summary was reviewed\par Recommend  to continue present medication, use insensitive spirometry to prevent atelectasis of the lung\par F/u with a pulmonologist, cardiologist\par Refused Tdap vaccine\par Recommend to continue present meds\par Recommend to monitor BP reading at home, low salt diet , decrease caffeine intake is recommended.\par F/u with a PCP.\par Patient is verbalized understanding.

## 2022-05-11 NOTE — HISTORY OF PRESENT ILLNESS
[Post-hospitalization from ___ Hospital] : Post-hospitalization from [unfilled] Hospital [Admitted on: ___] : The patient was admitted on [unfilled] [Discharged on ___] : discharged on [unfilled] [Pertinent Labs] : pertinent labs [Radiology Findings] : radiology findings [Discharge Med List] : discharge medication list [FreeTextEntry2] : Ms. BAILEY CISNEROS 71 year  female  with a PMH HTN, depression, insomnia, XENIA on a CPAP, COPD  presents to the office to f/u after ER. As per patient on  05/03/22,  she felt down on the  walkway  near a senior citizen home  Patient was walking her dog, and was walking on a straight path, and as she was walking on a straight path, her leg got caught on the grass.  Patient tripped and fell on the L side, hit her chest, L arm, L knee over the concrete. As per patient , she was not able to stand up,  she crawl to the bench and called  her daughter. Patient's daughter took her to a [facility] MD, had an x-ray and d/c home(was told that everything is OK).  2 days later pain got much worse went to [facility]. Where did a blood test, cxr, ct scan of the head, CT scan of the chest,abd,pelvis. Patient has all results on her phone(portal).  CT scan of the chest from 05/07/22  showed -  minimally acute L lateral 5th through 7th ribs fx with hematoma of the chest wall musculature. Has an acute PE of segmental arteries in the LLL, RLL and RUL. Had a ct scan of the abd/pelvis, has a R renal cysts(not new as per patient), small umbillical hernia. Doppler u/s is neg for a DVT. Ct scan of the head - no evidence of acute intracranial abnormality. 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 CBC is nl, no anemia, platelet count is 298, lft's, are nl. Patient was d/c home on xarelto, percocet and robaxin. Initiallo was sent with [facility] but it is expensive, switch to xarelto). As per patient she feels better, still has pain on the L side of the chest wall. Pain aggravates on a deep inspiration, on a movement, turning to the side.\par

## 2022-05-18 ENCOUNTER — APPOINTMENT (OUTPATIENT)
Dept: CARDIOLOGY | Facility: CLINIC | Age: 72
End: 2022-05-18
Payer: MEDICARE

## 2022-05-18 ENCOUNTER — NON-APPOINTMENT (OUTPATIENT)
Age: 72
End: 2022-05-18

## 2022-05-18 VITALS — DIASTOLIC BLOOD PRESSURE: 90 MMHG | SYSTOLIC BLOOD PRESSURE: 164 MMHG

## 2022-05-18 VITALS
WEIGHT: 217 LBS | OXYGEN SATURATION: 96 % | DIASTOLIC BLOOD PRESSURE: 92 MMHG | BODY MASS INDEX: 38.45 KG/M2 | HEART RATE: 73 BPM | SYSTOLIC BLOOD PRESSURE: 178 MMHG | HEIGHT: 63 IN | TEMPERATURE: 97.2 F

## 2022-05-18 PROCEDURE — 93000 ELECTROCARDIOGRAM COMPLETE: CPT

## 2022-05-18 PROCEDURE — 99214 OFFICE O/P EST MOD 30 MIN: CPT

## 2022-05-18 RX ORDER — ASPIRIN 325 MG/1
325 TABLET ORAL
Refills: 0 | Status: DISCONTINUED | COMMUNITY
End: 2022-05-18

## 2022-05-18 NOTE — HISTORY OF PRESENT ILLNESS
[FreeTextEntry1] : 71F with HTN, COPD, Hx of DVT who presents for evaluation for worsening MINOR, exertional CP, in the setting of a recent PE\par \par On 22, tripped and fell- rib fractures and CTA notable for bilateral segmental PE. Went to [place], started on Xarelto. She has noted over the last few months worsening dyspnea on exertion with associated exertional CP. Since her recent PE, she notices an exacerbation of those symptoms. Went to see pulmonary today, could barely walk down the hallway without having dyspnea and chest tightness, sent here for further eval. She notes occasional lightheadedness \par \par Former smoker, 50 pack year hx. Brother had CABG at age 66. Brother  at 60 from an MI. Both parents had MI's. Used to work in a senior center. \par \par ECG: SR, with anteroseptal ST changes, possible ischemia\par CTA : , mild CAD < 20%\par TTE 2020: 65-70%, PSB 1.65cm, mild MR \par \par Xarelto 10mg BID --> 20mg daily\par \par Atenolol 25mg

## 2022-05-18 NOTE — DISCUSSION/SUMMARY
[FreeTextEntry1] : MINOR: Likely multifactorial given recent PE, weight, deconditioning, underlying COPD, uncontrolled BP. TTE in 2020 with possible LVOT outflow turbulence but no significant gradient was detected, otherwise normal LV function. CT coronaries in July 2020 with mild CAD. ECG today is grossly unchanged from prior\par \par Will repeat TTE\par Consider repeat CT coronaries although test from July 2020 with nonobstructive CAD\par Needs more aggressive BP control, would consider adding diuretic but pt having trouble getting around due to rib pain so will avoid diuretics. Start norvasc 5mg today \par \par HTN- On atenolol, add norvasc 5mg \par \par PE: On Xarelto, following with pulmonary \par \par RV 4 weeks

## 2022-06-03 ENCOUNTER — APPOINTMENT (OUTPATIENT)
Dept: FAMILY MEDICINE | Facility: CLINIC | Age: 72
End: 2022-06-03
Payer: MEDICARE

## 2022-06-03 VITALS
OXYGEN SATURATION: 95 % | DIASTOLIC BLOOD PRESSURE: 72 MMHG | WEIGHT: 222.44 LBS | HEIGHT: 63 IN | SYSTOLIC BLOOD PRESSURE: 134 MMHG | BODY MASS INDEX: 39.41 KG/M2 | HEART RATE: 71 BPM

## 2022-06-03 DIAGNOSIS — S22.39XA FRACTURE OF ONE RIB, UNSPECIFIED SIDE, INITIAL ENCOUNTER FOR CLOSED FRACTURE: ICD-10-CM

## 2022-06-03 PROCEDURE — 99215 OFFICE O/P EST HI 40 MIN: CPT | Mod: 25

## 2022-06-03 RX ORDER — METHOCARBAMOL 500 MG/1
500 TABLET, FILM COATED ORAL
Refills: 0 | Status: COMPLETED | COMMUNITY

## 2022-06-03 RX ORDER — OXYCODONE HYDROCHLORIDE AND ACETAMINOPHEN 5; 325 MG/1; MG/1
5-325 TABLET ORAL
Refills: 0 | Status: COMPLETED | COMMUNITY

## 2022-06-03 RX ORDER — LEVOFLOXACIN 500 MG/1
500 TABLET, FILM COATED ORAL DAILY
Qty: 10 | Refills: 0 | Status: DISCONTINUED | COMMUNITY
Start: 2022-03-24 | End: 2022-06-03

## 2022-06-03 RX ORDER — AZELASTINE HYDROCHLORIDE 137 UG/1
0.1 SPRAY, METERED NASAL
Refills: 0 | Status: COMPLETED | COMMUNITY

## 2022-06-03 RX ORDER — AMLODIPINE BESYLATE 5 MG/1
5 TABLET ORAL
Refills: 0 | Status: COMPLETED | COMMUNITY

## 2022-06-03 RX ORDER — BENZONATATE 100 MG/1
100 CAPSULE ORAL
Qty: 30 | Refills: 0 | Status: DISCONTINUED | COMMUNITY
Start: 2022-05-13 | End: 2022-06-03

## 2022-06-03 RX ORDER — PREDNISONE 20 MG/1
20 TABLET ORAL
Qty: 18 | Refills: 0 | Status: DISCONTINUED | COMMUNITY
Start: 2022-03-24 | End: 2022-06-03

## 2022-06-03 RX ORDER — FLUTICASONE PROPIONATE 50 UG/1
50 SPRAY, METERED NASAL
Refills: 0 | Status: COMPLETED | COMMUNITY

## 2022-06-03 RX ORDER — OXYCODONE AND ACETAMINOPHEN TABLETS 10; 300 MG/1; MG/1
TABLET ORAL
Refills: 0 | Status: DISCONTINUED | COMMUNITY
End: 2022-06-03

## 2022-06-03 NOTE — HEALTH RISK ASSESSMENT
Skin Type: III Protocol For Puva: The patient received PUVA. Total Treatment Time: 10 mins Detail Level: Generalized Protocol For Photochemotherapy: Tar And Nbuvb (Goeckerman Treatment): The patient received Photochemotherapy: Tar and NBUVB (Goeckerman treatment). Protocol For Photochemotherapy: Mineral Oil And Nbuvb: The patient received Photochemotherapy: Mineral Oil and NBUVB (mineral oil applied to all lesions prior to phototherapy). [Former] : Former Protocol: Photochemotherapy: Mineral Oil and NBUVB [No] : No Protocol For Photochemotherapy For Severe Photoresponsive Dermatoses: Petrolatum And Broad Band Uvb: The patient received Photochemotherapyfor severe photoresponsive dermatoses: Petrolatum and Broad Band UVB requiring at least 4 to 8 hours of care under direct physician supervision. [No falls in past year] : Patient reported no falls in the past year Post-Care Instructions: I reviewed with the patient in detail post-care instructions. Patient is to wear sun protection. Patients may expect sunburn like redness, discomfort and scabbing. [2] : 2) Feeling down, depressed, or hopeless for more than half of the days (2) Protocol For Photochemotherapy: Baby Oil And Nbuvb: The patient received Photochemotherapy: Baby Oil and NBUVB (baby oil applied to all lesions prior to phototherapy). [PHQ-9 Positive] : PHQ-9 Positive Protocol For Photochemotherapy For Severe Photoresponsive Dermatoses: Tar And Nbuvb (Goeckerman Treatment): The patient received Photochemotherapy for severe photoresponsive dermatoses: Tar and NBUVB (Goeckerman treatment) requiring at least 4 to 8 hours of care under direct physician supervision. [I have developed a follow-up plan documented below in the note.] : I have developed a follow-up plan documented below in the note. Protocol For Broad Band Uvb: The patient received Broad Band UVB. [Very Good] : ~his/her~ current health as very good Protocol For Photochemotherapy For Severe Photoresponsive Dermatoses: Petrolatum And Nbuvb: The patient received Photochemotherapy for severe photoresponsive dermatoses: Petrolatum and NBUVB requiring at least 4 to 8 hours of care under direct physician supervision. [Fair] :  ~his/her~ mood as fair Protocol For Protocol For Photochemotherapy For Severe Photoresponsive Dermatoses: Bath Puva: The patient received Photochemotherapy for severe photoresponsive dermatoses: Bath PUVA requiring at least 4 to 8 hours of care under direct physician supervision. [Patient declined mammogram] : Patient declined mammogram Protocol For Photochemotherapy: Triamcinolone Ointment And Nbuvb: The patient received Photochemotherapy: Triamcinolone and NBUVB (triamcinolone ointment applied to all lesions prior to phototherapy). [Patient declined PAP Smear] : Patient declined PAP Smear Protocol For Photochemotherapy: Tar And Broad Band Uvb (Goeckerman Treatment): The patient received Photochemotherapy: Tar and Broad Band UVB (Goeckerman treatment). [Patient declined colonoscopy] : Patient declined colonoscopy Comments On Previous Treatment: Skin type III.   \\nFace is getting red and sore [HIV Test offered] : HIV Test offered [employer]: 1600 mj [Hepatitis C test offered] : Hepatitis C test offered Total Body Time: 6:47 [With Family] : lives with family [employer] (Optional- Include Units): 3.98 [] :  Protocol For Nbuvb: The patient received NBUVB. [# Of Children ___] : has [unfilled] children Protocol For Nb Uva: The patient received NB UVA. [Fully functional (bathing, dressing, toileting, transferring, walking, feeding)] : Fully functional (bathing, dressing, toileting, transferring, walking, feeding) Treatment Number: [id number] [Fully functional (using the telephone, shopping, preparing meals, housekeeping, doing laundry, using] : Fully functional and needs no help or supervision to perform IADLs (using the telephone, shopping, preparing meals, housekeeping, doing laundry, using transportation, managing medications and managing finances) Protocol For Bath Puva: The patient received Bath PUVA. [Smoke Detector] : smoke detector Protocol For Uva1: The patient received UVA1. [Seat Belt] :  uses seat belt Protocol For Photochemotherapy: Petrolatum And Nbuvb: The patient received Photochemotherapy: Petrolatum and NBUVB (petrolatum applied to all lesions prior to phototherapy). [[id number]] : 4 Protocol For Photochemotherapy For Severe Photoresponsive Dermatoses: Puva: The patient received Photochemotherapy for severe photoresponsive dermatoses: PUVA requiring at least 4 to 8 hours of care under direct physician supervision. [de-identified] : lives with sister Name Of Supervising Technician: Lillie Ruiz [FreeTextEntry3] : passed 2004 Consent: Written consent obtained. The risks were reviewed with the patient including but not limited to: burn, pigmentary changes, pain, blistering, scabbing, redness, increased risk of skin cancers, and the remote possibility of scarring. Protocol For Photochemotherapy For Severe Photoresponsive Dermatoses: Tar And Broad Band Uvb (Goeckerman Treatment): The patient received Photochemotherapy for severe photoresponsive dermatoses: Tar and Broad Band UVB (Goeckerman treatment) requiring at least 4 to 8 hours of care under direct physician supervision. Protocol For Photochemotherapy: Petrolatum And Broad Band Uvb: The patient received Photochemotherapy: Petrolatum and Broad Band UVB. Protocol For Uva: The patient received UVA. Changes In Treatment Protocol: May ^ 40mJ at next tx as tolerated by patient.  Hold dose Protocol For Photochemotherapy: Mineral Oil And Broad Band Uvb: The patient received Photochemotherapy: Mineral Oil and Broad Band UVB.

## 2022-06-03 NOTE — HISTORY OF PRESENT ILLNESS
[Stopped use since ___] : No tobacco use since [unfilled] [Cigarettes ___ packs/day] : Patient smokes [unfilled] packs of cigarettes per day [___ Year(s)] : [unfilled] year(s) ago [Maintenance] : Maintenance: former smoker who stopped smoking  longer that 6 months ago but less that 5 years [Maintain commitment] : Maintain commitment [de-identified] : 71 year old female is here for a followup visit. Patient is here for medication renewals and for blood work discussion. Medications and allergies were reviewed and assessed.  \par many issues, fall, PE, had covid \par fu for mood, pain, anxiety, insomnia, balance issues\par patient with multiple issues to discuss\par son has testicular cancer\par lost sister 08/2021 from [place] after hospital stay\par  [FreeTextEntry8] : \par \par

## 2022-06-03 NOTE — REVIEW OF SYSTEMS
[Cough] : cough [Insomnia] : insomnia [Anxiety] : anxiety [Depression] : depression [Negative] : Heme/Lymph [FreeTextEntry9] : knee pain - bilateral and severe [de-identified] : unbalance [de-identified] : severe/chronic

## 2022-06-03 NOTE — ASSESSMENT
[FreeTextEntry1] : COVID & HOSPITALIZATION \par went to [facility]. Where did a blood test, cxr, ct scan of the head, CT scan of the chest,abd,pelvis. Patient has all results on her phone(portal). \par \par CT scan of the chest from 22 showed - minimally acute L lateral 5th through 7th ribs fx with hematoma of the chest wall musculature. 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 Had a ct scan of the abd/pelvis, has a R renal cysts(not new as per patient), small umbillical hernia. Doppler u/s is neg for a DVT. \par \par Ct scan of the head - no evidence of acute intracranial abnormality. 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 CBC is nl, no anemia, platelet count is 298, lft's, are nl. Patient was d/c home on xarelto, percocet and robaxin. InitiallY was sent with [employer] but it is expensive, switch to xarelto). \par wishes us to refill xarelto, only has four pills, will refill, however patient has been made aware that pulmonologist and hematologist have to determination cessation time\par \par reviewed hospital records with patient\par left 5-7 rib fractures\par improved with muscle relaxer, will refill for patient\par \par went to pulm 22 - Who gave patient new inhaler, \par \par saw cardiologist 22 \par had ekg, walking tests\par \par balance issues/history of mini strokes\par refer to neuro, balance issues\par patient fell, fell forward, needs rolling walker for now until evaluated\par has rolling walker at home, will start to use\par  had fu with neurology, no acute changes, no intervention\par \par  tremor of right hand\par stopped when she stopped paxil\par \par lost sister 2021 from [place] after hospital stay\par son with testicular cancer\par \par OBESITY\par The diagnosis of obesity was discussed with the patient. The patient was counseled on diet and exercise. Patient was advised to eat a diet low in carbohydrates and low in fat with high protein diet with plenty of vegetables. Patient was counseled to eat small meals throughout the day avoiding carbohydrates, foods high in fat, foods that are fried and fast food.  Patient was advised to monitor fluid intake, to drink at least 8 glasses of pure water a day and reduce/stop intake of all sugar drinks including juice and soda. Patient was advised to try and exercise for 30-40 minutes per day for at least three days a week. Pros and cons of using medical management for weight loss versus diet/exercise alone was discussed. Medication options were provided for the patient and side affects and risks were identified and discussed.  Patient was advised to take any medications as prescribed and to call office or go to the ER immediately if any issues with the medications occur. All questions were answered.\par patient admits to emotional eating, fast food, cookies, junk food\par \par DEPRESSION/ANXIETY\par  Discussed diagnosis of anxiety and depression with the patient and potential outcomes/side affects of treatment versus non treatment. Medications were assessed and described at length. Side affects and black box warning were discussed. Patient was advised to continue will all medications prescribed and the need for compliance was discussed and emphasized. Patient was advised to not stop medications without discussing with a health care provider first. Patient was advised to continue psychotherapy or seek therapy if not currently attending. Patient was educated on addictive potential of controlled substances and was counseled to use only as needed and sparingly. Patient verbalized understanding of all the above.  due to covid and her situation living with her sister who is very difficult, financial issues, patient cannot sleep, is always unhappy, stressed, panic attacks, long family history of psych and mood disorders prior to the current situation, patient has long standing history of depression, anxiety and insomnia, this is severe acute on chronic issues, strong family history of psychiatric disorders reinforced patient must see psychotherapist for counseling - will call and make an appointment with the number I provided  \par realizes that she does not like klonopin, worked in the beginning, did not like how it made her feel, also failed prozac, failed effexor because it stopped working, failed lexapro , at this point will continue wellbutrin, which was helping, \par increased herself to wellbutrin 450 which helped, on buspar 30 bid\par hydroxyzine as needed\par seroquel did not help at night to sleep\par  trazodone at 150, 200 did not work, 300 is too much - hung over all day\par stopped paxil, tremors stopped, now on zoloft 50\par \par suggest in patient evaluation, refuses at this time\par \par FEELS ALONE, HER BROTHER IS DYING IN A PSYCH DUARTE \par  SISTER  OF [place]  AFTER HOSPITAL STAY IN PATIENTS APARTMENT \par son has testicular cancer, very worried\par MUST STOP COMBINING PREVIOUS MEDICATIONS - ADVISED TO DISCARD PREVIOUS MEDICATIONS   major issues are depression and insomnia:  spent time discussing that some of her feelings are situational due to covid, having no money, no where to go and stuck with her sister, lost 10 friends to covid, brother is dying, unhappy, resentful of people who are happy, has strong family history of psych issues, long history of personal psych issues  \par \par 22 - had three children, one who helps with finances, son out east, other daughter cant help financially, \par depressed, sad, unmotivated, unhappy, wants to watch tv all day, no interested in job or anything, feels alone, will increase zoloft to 100\par \par INSOMNIA \par chronic, now in acute flare failed, ambien, benadryl, remeron, failed ambien CR, failed trazodone, failed tempazam, seroquel , tried klonipin 1-2 prior to bedtime, however did not like it due to morning after, failed zyprexa\par again not sleeping\par then failed seroquel 150, did 200 and slept a little, 300 was too much, does not wish to be on seroquel anymore\par  now with paxil, can sleep a little better\par   uses xanax as needed, does not combine\par 22 - ambien was helping, no longer, will try seroquel again\par \par COPD\par from years of smoking,  finally aggress to maintenance inhaler \par started advair and is feeling much better\par ventolin as needed\par \par ALLERGIES\par  singulair works a little, continue , added advair, but could not afford, gave other options,  saw pulmonologist - who also referred to cardiology prior to changing medications\par is on CPAP\par \par   HTN \par The patient has a diagnosis of hypertension.  The diagnosis was discussed with patient and need for medication compliance and possible side affects and risks of noncompliance. Patient was told to adhere to a low salt diet and try to incorporate exercise daily. monitor\par \par bilateral knee pain/chronic\par reports that she cannot get surgery because she has no one to help her at home.  mobic did not help\par patient already has had bilateral knee injections - miserable with pain, still needs to work\par using tramadol only as needed, would like a refill, identified that she must only use as needed\par needs to lose 30 pounds in order for surgeon to do surgery\par give tramadol prn

## 2022-06-09 ENCOUNTER — APPOINTMENT (OUTPATIENT)
Dept: INTERNAL MEDICINE | Facility: CLINIC | Age: 72
End: 2022-06-09
Payer: MEDICARE

## 2022-06-09 PROCEDURE — 93306 TTE W/DOPPLER COMPLETE: CPT

## 2022-06-10 ENCOUNTER — RX RENEWAL (OUTPATIENT)
Age: 72
End: 2022-06-10

## 2022-06-21 ENCOUNTER — APPOINTMENT (OUTPATIENT)
Dept: FAMILY MEDICINE | Facility: CLINIC | Age: 72
End: 2022-06-21
Payer: MEDICARE

## 2022-06-21 VITALS
TEMPERATURE: 97.3 F | OXYGEN SATURATION: 97 % | SYSTOLIC BLOOD PRESSURE: 140 MMHG | HEART RATE: 74 BPM | HEIGHT: 63 IN | DIASTOLIC BLOOD PRESSURE: 74 MMHG | WEIGHT: 222 LBS | BODY MASS INDEX: 39.34 KG/M2

## 2022-06-21 PROCEDURE — 99215 OFFICE O/P EST HI 40 MIN: CPT

## 2022-06-21 RX ORDER — FLUTICASONE PROPIONATE AND SALMETEROL 50; 250 UG/1; UG/1
250-50 POWDER RESPIRATORY (INHALATION)
Qty: 1 | Refills: 3 | Status: DISCONTINUED | COMMUNITY
Start: 2021-03-02 | End: 2022-06-21

## 2022-06-21 RX ORDER — APIXABAN 5 MG/1
5 TABLET, FILM COATED ORAL
Qty: 60 | Refills: 0 | Status: COMPLETED | COMMUNITY
Start: 2022-05-08

## 2022-06-21 RX ORDER — RIVAROXABAN 20 MG/1
20 TABLET, FILM COATED ORAL
Qty: 90 | Refills: 1 | Status: DISCONTINUED | COMMUNITY
End: 2022-06-21

## 2022-06-21 NOTE — HISTORY OF PRESENT ILLNESS
[de-identified] : 71 year old female is here for a followup visit. Patient is here for medication renewals and for blood work discussion. Medications and allergies were reviewed and assessed.  \par many issues, fall, PE, had covid \par fu for mood, pain, anxiety, insomnia, balance issues\par patient with multiple issues to discuss\par son has testicular cancer\par lost sister 08/2021 from [place] after hospital stay\par  [Stopped use since ___] : No tobacco use since [unfilled] [Cigarettes ___ packs/day] : Patient smokes [unfilled] packs of cigarettes per day [___ Year(s)] : [unfilled] year(s) ago [Maintenance] : Maintenance: former smoker who stopped smoking  longer that 6 months ago but less that 5 years [Maintain commitment] : Maintain commitment [FreeTextEntry8] : \par \par

## 2022-06-21 NOTE — REVIEW OF SYSTEMS
[Insomnia] : insomnia [Anxiety] : anxiety [Depression] : depression [Negative] : Heme/Lymph [FreeTextEntry9] : knee pain - bilateral and severe [de-identified] : unbalance [de-identified] : severe/chronic

## 2022-06-21 NOTE — ASSESSMENT
[FreeTextEntry1] : COVID & HOSPITALIZATION & PE\par went to [facility]. Where did a blood test, cxr, ct scan of the head, CT scan of the chest,abd,pelvis. Patient has all results on her phone(portal). \par CT scan of the chest from 22 showed - minimally acute L lateral 5th through 7th ribs fx with hematoma of the chest wall musculature. 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 Had a ct scan of the abd/pelvis, has a R renal cysts(not new as per patient), small umbillical hernia. Doppler u/s is neg for a DVT. \par \par Patient was d/c home on xarelto, percocet and robaxin. Initially was sent with [employer] but it is expensive, switch to xarelto). \par \par 22 patient no longer wants to take xarelto, feels it is giving her diarrhea, spoke to trauma surgeon who originally prescribed, at patients insistance will switch to coumadin, must come back in 7-10 days for INR check\par will start coumadin, in past was on 3 mg daily, will start with three and reassess \par \par left 5-7 rib fractures\par improved with muscle relaxer, will refill for patient\par is miserable every day\par \par went to [facility] 22 - Who gave patient new inhaler, wishes to switch to someone new, 22\par \par saw cardiologist 22 \par had ekg, walking tests\par \par balance issues/history of mini strokes\par refer to neuro, balance issues\par patient fell, fell forward, needs rolling walker for now until evaluated\par has rolling walker at home, will start to use\par  had fu with neurology, no acute changes, no intervention\par \par  tremor of right hand\par stopped when she stopped paxil\par \par lost sister 2021 from [place] after hospital stay\par son with testicular cancer\par \par OBESITY\par The diagnosis of obesity was discussed with the patient. The patient was counseled on diet and exercise. Patient was advised to eat a diet low in carbohydrates and low in fat with high protein diet with plenty of vegetables. Patient was counseled to eat small meals throughout the day avoiding carbohydrates, foods high in fat, foods that are fried and fast food.  Patient was advised to monitor fluid intake, to drink at least 8 glasses of pure water a day and reduce/stop intake of all sugar drinks including juice and soda. Patient was advised to try and exercise for 30-40 minutes per day for at least three days a week. Pros and cons of using medical management for weight loss versus diet/exercise alone was discussed. Medication options were provided for the patient and side affects and risks were identified and discussed.  Patient was advised to take any medications as prescribed and to call office or go to the ER immediately if any issues with the medications occur. All questions were answered.\par patient admits to emotional eating, fast food, cookies, junk food\par \par DEPRESSION/ANXIETY\par  Discussed diagnosis of anxiety and depression with the patient and potential outcomes/side affects of treatment versus non treatment. Medications were assessed and described at length. Side affects and black box warning were discussed. Patient was advised to continue will all medications prescribed and the need for compliance was discussed and emphasized. Patient was advised to not stop medications without discussing with a health care provider first. Patient was advised to continue psychotherapy or seek therapy if not currently attending. Patient was educated on addictive potential of controlled substances and was counseled to use only as needed and sparingly. Patient verbalized understanding of all the above.  due to covid and her situation living with her sister who is very difficult, financial issues, patient cannot sleep, is always unhappy, stressed, panic attacks, long family history of psych and mood disorders prior to the current situation, patient has long standing history of depression, anxiety and insomnia, this is severe acute on chronic issues, strong family history of psychiatric disorders reinforced patient must see psychotherapist for counseling - will call and make an appointment with the number I provided  \par realizes that she does not like klonopin, worked in the beginning, did not like how it made her feel, also failed prozac, failed effexor because it stopped working, failed lexapro , at this point will continue wellbutrin, which was helping, \par increased herself to wellbutrin 450 which helped, on buspar 30 bid\par hydroxyzine as needed\par seroquel did not help at night to sleep\par  trazodone at 150, 200 did not work, 300 is too much - hung over all day\par stopped paxil, tremors stopped, now on zoloft 100\par \par suggest in patient evaluation, refuses at this time\par \par FEELS ALONE, HER BROTHER IS DYING IN A PSYCH DUARTE \par  SISTER  OF [place]  AFTER HOSPITAL STAY IN PATIENTS APARTMENT \par son has testicular cancer, very worried\par MUST STOP COMBINING PREVIOUS MEDICATIONS - ADVISED TO DISCARD PREVIOUS MEDICATIONS   major issues are depression and insomnia:  spent time discussing that some of her feelings are situational due to covid, having no money, no where to go and stuck with her sister, lost 10 friends to covid, brother is dying, unhappy, resentful of people who are happy, has strong family history of psych issues, long history of personal psych issues  \par \par 22 - had three children, one who helps with finances, son out east, other daughter cant help financially, \par depressed, sad, unmotivated, unhappy, wants to watch tv all day, no interested in job or anything, feels alone, will increase zoloft to 100\par \par INSOMNIA \par chronic, now in acute flare failed, ambien, benadryl, remeron, failed ambien CR, failed trazodone, failed tempazam, seroquel , tried klonipin 1-2 prior to bedtime, however did not like it due to morning after, failed zyprexa\par again not sleeping\par then failed seroquel 150, did 200 and slept a little, 300 was too much, does not wish to be on seroquel anymore\par  now with paxil, can sleep a little better\par   uses xanax as needed, does not combine\par 22 - ambien was helping, no longer, will try seroquel again\par   using trazodone\par \par COPD\par from years of smoking,  finally aggress to maintenance inhaler \par started advair and is feeling much better\par ventolin as needed\par \par ALLERGIES\par  singulair works a little, continue , added advair, but could not afford, gave other options,  saw pulmonologist - who also referred to cardiology prior to changing medications\par is on CPAP\par \par   HTN \par The patient has a diagnosis of hypertension.  The diagnosis was discussed with patient and need for medication compliance and possible side affects and risks of noncompliance. Patient was told to adhere to a low salt diet and try to incorporate exercise daily. monitor\par \par bilateral knee pain/chronic\par reports that she cannot get surgery because she has no one to help her at home.  mobic did not help\par patient already has had bilateral knee injections - miserable with pain, still needs to work\par using tramadol only as needed, would like a refill, identified that she must only use as needed\par needs to lose 30 pounds in order for surgeon to do surgery\par give tramadol prn

## 2022-06-21 NOTE — HEALTH RISK ASSESSMENT
[Former] : Former [No] : No [No falls in past year] : Patient reported no falls in the past year [2] : 2) Feeling down, depressed, or hopeless for more than half of the days (2) [PHQ-9 Positive] : PHQ-9 Positive [I have developed a follow-up plan documented below in the note.] : I have developed a follow-up plan documented below in the note. [[id number]] : 4 [Very Good] : ~his/her~ current health as very good [Fair] :  ~his/her~ mood as fair [Patient declined mammogram] : Patient declined mammogram [Patient declined PAP Smear] : Patient declined PAP Smear [Patient declined colonoscopy] : Patient declined colonoscopy [HIV Test offered] : HIV Test offered [Hepatitis C test offered] : Hepatitis C test offered [With Family] : lives with family [] :  [# Of Children ___] : has [unfilled] children [Fully functional (bathing, dressing, toileting, transferring, walking, feeding)] : Fully functional (bathing, dressing, toileting, transferring, walking, feeding) [Fully functional (using the telephone, shopping, preparing meals, housekeeping, doing laundry, using] : Fully functional and needs no help or supervision to perform IADLs (using the telephone, shopping, preparing meals, housekeeping, doing laundry, using transportation, managing medications and managing finances) [Smoke Detector] : smoke detector [Seat Belt] :  uses seat belt [de-identified] : lives with sister [FreeTextEntry3] : passed 2004

## 2022-06-27 ENCOUNTER — APPOINTMENT (OUTPATIENT)
Dept: ORTHOPEDIC SURGERY | Facility: CLINIC | Age: 72
End: 2022-06-27

## 2022-06-27 PROCEDURE — 73610 X-RAY EXAM OF ANKLE: CPT | Mod: LT

## 2022-06-27 PROCEDURE — 99214 OFFICE O/P EST MOD 30 MIN: CPT

## 2022-06-27 NOTE — HISTORY OF PRESENT ILLNESS
[5] : 5 [3] : 3 [Localized] : localized [Sharp] : sharp [Intermittent] : intermittent [Household chores] : household chores [Leisure] : leisure [Social interactions] : social interactions [Rest] : rest [Standing] : standing [Walking] : walking [Stairs] : stairs [de-identified] : Pt is a 71 year old F who presents today for evaluation of their left foot/ankle. Pt states that she was home in a chair, stood up and felt a sharp pain in her heel radiating up her leg. Went to podiatrist 6/21/22 who took XR and diagnosed Achilles tendinitis. Had XR taken which showed no evidence of fx. Denies previous injury. Using a walker and short CAM boot which has been somewhat helpful. Reports balance issues and has had multiple falls in the past.  [] : Post Surgical Visit: no [FreeTextEntry1] : L foot [FreeTextEntry7] : leg

## 2022-06-27 NOTE — PHYSICAL EXAM
[NL (40)] : plantar flexion 40 degrees [5___] : eversion 5[unfilled]/5 [2+] : posterior tibialis pulse: 2+ [Normal] : saphenous nerve sensation normal [Left] : left ankle [Mild] : mild diffused ankle swelling [4___] : plantar flexion 4[unfilled]/5 [] : no pain when stressing lateral tarsal metatarsal joint [There are no fractures, subluxations or dislocations. No significant abnormalities are seen] : There are no fractures, subluxations or dislocations. No significant abnormalities are seen [de-identified] : WB in short CAM boot.  [FreeTextEntry3] : Focal thickening in the achilles tendon watershed area. [FreeTextEntry9] : Small achilles insertional/plantar calcaneal spurs [de-identified] : inversion 20 degrees [de-identified] : eversion 15 degrees [TWNoteComboBox7] : dorsiflexion 10 degrees

## 2022-06-27 NOTE — ASSESSMENT
[FreeTextEntry1] : Patient can wean out of the cam walker into a supportive sneaker as tolerated. Heel lifts provided.\par MDP sent (patient on warfarin).\par She will start PT.\par Icing to affected area is encouraged.

## 2022-07-05 ENCOUNTER — APPOINTMENT (OUTPATIENT)
Dept: FAMILY MEDICINE | Facility: CLINIC | Age: 72
End: 2022-07-05

## 2022-07-05 VITALS
HEART RATE: 72 BPM | SYSTOLIC BLOOD PRESSURE: 132 MMHG | BODY MASS INDEX: 39.34 KG/M2 | HEIGHT: 63 IN | OXYGEN SATURATION: 96 % | WEIGHT: 222 LBS | DIASTOLIC BLOOD PRESSURE: 70 MMHG | TEMPERATURE: 97.6 F

## 2022-07-05 DIAGNOSIS — R26.89 OTHER ABNORMALITIES OF GAIT AND MOBILITY: ICD-10-CM

## 2022-07-05 LAB
INR PPP: 6.8 RATIO
POCT-PROTHROMBIN TIME: 81.8 SECS

## 2022-07-05 PROCEDURE — 85610 PROTHROMBIN TIME: CPT | Mod: QW

## 2022-07-05 PROCEDURE — 99215 OFFICE O/P EST HI 40 MIN: CPT | Mod: 25

## 2022-07-05 RX ORDER — METHYLPREDNISOLONE 4 MG/1
4 TABLET ORAL
Qty: 1 | Refills: 1 | Status: DISCONTINUED | COMMUNITY
Start: 2022-06-27 | End: 2022-07-05

## 2022-07-05 NOTE — PHYSICAL EXAM
[Well Nourished] : well nourished [Well Developed] : well developed [Well-Appearing] : well-appearing [Clear to Auscultation] : lungs were clear to auscultation bilaterally [Regular Rhythm] : with a regular rhythm [Normal S1, S2] : normal S1 and S2 [Normal Affect] : the affect was normal [Alert and Oriented x3] : oriented to person, place, and time [Normal Insight/Judgement] : insight and judgment were intact [de-identified] : in walking boot, on the left, requires rolling walker

## 2022-07-05 NOTE — REVIEW OF SYSTEMS
[Insomnia] : insomnia [Anxiety] : anxiety [Depression] : depression [Negative] : Heme/Lymph [FreeTextEntry9] : knee pain - bilateral , left achilles tendonitis [de-identified] : unbalance [de-identified] : severe/chronic

## 2022-07-05 NOTE — ASSESSMENT
[FreeTextEntry1] : COVID & HOSPITALIZATION & PE\par went to [facility]. Where did a blood test, cxr, ct scan of the head, CT scan of the chest,abd,pelvis. Patient has all results on her phone(portal). \par CT scan of the chest from 05/07/22 showed - minimally acute L lateral 5th through 7th ribs fx with hematoma of the chest wall musculature. 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 Had a ct scan of the abd/pelvis, has a R renal cysts(not new as per patient), small umbillical hernia. Doppler u/s is neg for a DVT. \par \par Patient was d/c home on xarelto, percocet and robaxin. Initially was sent with [id number] but it is expensive, switch to xarelto). \par \par 6/21/22 patient no longer wants to take xarelto, feels it is giving her diarrhea, spoke to trauma surgeon who originally prescribed, at patients insistence will switch to coumadin, must come back in 7-10 days for INR check\par will start coumadin, in past was on 3 mg daily, will start with three and reassess \par 7/5/22  INR check - was placed on 3 mg daily as she was on in the past, but today inr was 6.9\par all the side affects that she was experiencing due to xarelto such as diarrhea has stopped\par will hold coumadin for three days, then start 2 mg daily and reassess in 3 weeks\par any issues, all falls, neuro symptoms, go directly to the ER\par \par left 5-7 rib fractures\par improved with muscle relaxer, will refill for patient\par is miserable every day\par \par achilles tendonitis\par saw ortho 6/27/22 in walking cam, doing well \par \par went to pulm 5/18/22 - Who gave patient new inhaler, wishes to switch to someone new, 7/6/22\par \par saw cardiologist 5/18/22 \par had ekg, walking tests\par \par balance issues/history of mini strokes\par refer to neuro, balance issues\par patient fell, fell forward, needs rolling walker for now until evaluated\par has rolling walker at home, will start to use\par 5/22 had fu with neurology, no acute changes, no intervention\par \par  tremor of right hand\par stopped when she stopped paxil\par \par lost sister 08/2021 from [place] after hospital stay\par son with testicular cancer\par \par OBESITY\par The diagnosis of obesity was discussed with the patient. The patient was counseled on diet and exercise. Patient was advised to eat a diet low in carbohydrates and low in fat with high protein diet with plenty of vegetables. Patient was counseled to eat small meals throughout the day avoiding carbohydrates, foods high in fat, foods that are fried and fast food.  Patient was advised to monitor fluid intake, to drink at least 8 glasses of pure water a day and reduce/stop intake of all sugar drinks including juice and soda. Patient was advised to try and exercise for 30-40 minutes per day for at least three days a week. Pros and cons of using medical management for weight loss versus diet/exercise alone was discussed. Medication options were provided for the patient and side affects and risks were identified and discussed.  Patient was advised to take any medications as prescribed and to call office or go to the ER immediately if any issues with the medications occur. All questions were answered.\par patient admits to emotional eating, fast food, cookies, junk food\par \par DEPRESSION/ANXIETY\par  Discussed diagnosis of anxiety and depression with the patient and potential outcomes/side affects of treatment versus non treatment. Medications were assessed and described at length. Side affects and black box warning were discussed. Patient was advised to continue will all medications prescribed and the need for compliance was discussed and emphasized. Patient was advised to not stop medications without discussing with a health care provider first. Patient was advised to continue psychotherapy or seek therapy if not currently attending. Patient was educated on addictive potential of controlled substances and was counseled to use only as needed and sparingly. Patient verbalized understanding of all the above.  due to covid and her situation living with her sister who is very difficult, financial issues, patient cannot sleep, is always unhappy, stressed, panic attacks, long family history of psych and mood disorders prior to the current situation, patient has long standing history of depression, anxiety and insomnia, this is severe acute on chronic issues, strong family history of psychiatric disorders reinforced patient must see psychotherapist for counseling - will call and make an appointment with the number I provided  \par realizes that she does not like klonopin, worked in the beginning, did not like how it made her feel, also failed prozac, failed effexor because it stopped working, failed lexapro , at this point will continue wellbutrin, which was helping, \par increased herself to wellbutrin 450 which helped, on buspar 30 bid\par hydroxyzine as needed\par seroquel did not help at night to sleep\par  trazodone at 150, 200 did not work, 300 is too much - hung over all day\par stopped paxil, tremors stopped, now on zoloft 100\par \par 1/24/22 - had three children, one who helps with finances, son out east, other daughter cant help financially, \par depressed, sad, unmotivated, unhappy, wants to watch tv all day, no interested in job or anything, feels alone, will increase zoloft to 100\par \par 7/5/22 more anxiety and depression lately due to current situation which is understandable, will try increasing to 150 daily and reassess\par suggest in patient evaluation, refuses at this time\par \par INSOMNIA \par chronic, now in acute flare failed, ambien, benadryl, remeron, failed ambien CR, failed trazodone, failed tempazam, seroquel , tried klonipin 1-2 prior to bedtime, however did not like it due to morning after, failed zyprexa\par again not sleeping\par then failed seroquel 150, did 200 and slept a little, 300 was too much, does not wish to be on seroquel anymore\par  now with paxil, can sleep a little better\par   uses xanax as needed, does not combine\par 1/24/22 - ambien was helping, no longer, will try seroquel again\par 6/22  using trazodone\par \par COPD\par from years of smoking,  finally aggress to maintenance inhaler \par started advair and is feeling much better\par ventolin as needed\par \par ALLERGIES\par  singulair works a little, continue , added advair, but could not afford, gave other options,  saw pulmonologist - who also referred to cardiology prior to changing medications\par is on CPAP\par \par   HTN \par The patient has a diagnosis of hypertension.  The diagnosis was discussed with patient and need for medication compliance and possible side affects and risks of noncompliance. Patient was told to adhere to a low salt diet and try to incorporate exercise daily. monitor\par \par bilateral knee pain/chronic\par reports that she cannot get surgery because she has no one to help her at home.  mobic did not help\par patient already has had bilateral knee injections - miserable with pain, still needs to work\par using tramadol only as needed, would like a refill, identified that she must only use as needed\par needs to lose 30 pounds in order for surgeon to do surgery\par

## 2022-07-05 NOTE — HEALTH RISK ASSESSMENT
[Former] : Former [No] : No [No falls in past year] : Patient reported no falls in the past year [2] : 2) Feeling down, depressed, or hopeless for more than half of the days (2) [PHQ-9 Positive] : PHQ-9 Positive [I have developed a follow-up plan documented below in the note.] : I have developed a follow-up plan documented below in the note. [Very Good] : ~his/her~ current health as very good [Fair] :  ~his/her~ mood as fair [Patient declined mammogram] : Patient declined mammogram [Patient declined PAP Smear] : Patient declined PAP Smear [Patient declined colonoscopy] : Patient declined colonoscopy [HIV Test offered] : HIV Test offered [With Family] : lives with family [Hepatitis C test offered] : Hepatitis C test offered [] :  [# Of Children ___] : has [unfilled] children [Fully functional (bathing, dressing, toileting, transferring, walking, feeding)] : Fully functional (bathing, dressing, toileting, transferring, walking, feeding) [Fully functional (using the telephone, shopping, preparing meals, housekeeping, doing laundry, using] : Fully functional and needs no help or supervision to perform IADLs (using the telephone, shopping, preparing meals, housekeeping, doing laundry, using transportation, managing medications and managing finances) [Smoke Detector] : smoke detector [Seat Belt] :  uses seat belt [[id number]] : 4 [de-identified] : lives with sister [FreeTextEntry3] : passed 2004

## 2022-07-05 NOTE — HISTORY OF PRESENT ILLNESS
[Stopped use since ___] : No tobacco use since [unfilled] [Cigarettes ___ packs/day] : Patient smokes [unfilled] packs of cigarettes per day [___ Year(s)] : [unfilled] year(s) ago [Maintenance] : Maintenance: former smoker who stopped smoking  longer that 6 months ago but less that 5 years [Maintain commitment] : Maintain commitment [de-identified] : 71 year old female is here for a followup visit. Patient is here for medication renewals and for blood work discussion. Medications and allergies were reviewed and assessed.  \par last visit switched to coumadin, here for a recheck \par many issues, fall, PE, had covid \par fu for mood, pain, anxiety, insomnia, balance issues\par patient with multiple issues to discuss\par son has testicular cancer\par lost sister 08/2021 from [place] after hospital stay\par  [FreeTextEntry8] : \par \par

## 2022-07-06 ENCOUNTER — APPOINTMENT (OUTPATIENT)
Dept: PULMONOLOGY | Facility: CLINIC | Age: 72
End: 2022-07-06

## 2022-07-06 VITALS — HEART RATE: 74 BPM | DIASTOLIC BLOOD PRESSURE: 72 MMHG | SYSTOLIC BLOOD PRESSURE: 108 MMHG | OXYGEN SATURATION: 95 %

## 2022-07-06 DIAGNOSIS — Z87.891 PERSONAL HISTORY OF NICOTINE DEPENDENCE: ICD-10-CM

## 2022-07-06 LAB — POCT - HEMOGLOBIN (HGB), QUANTITATIVE, TRANSCUTANEOUS: 13.7

## 2022-07-06 PROCEDURE — 88738 HGB QUANT TRANSCUTANEOUS: CPT

## 2022-07-06 PROCEDURE — 99205 OFFICE O/P NEW HI 60 MIN: CPT | Mod: CS,25

## 2022-07-06 PROCEDURE — ZZZZZ: CPT

## 2022-07-06 PROCEDURE — 71046 X-RAY EXAM CHEST 2 VIEWS: CPT

## 2022-07-06 PROCEDURE — 94727 GAS DIL/WSHOT DETER LNG VOL: CPT

## 2022-07-06 PROCEDURE — 94010 BREATHING CAPACITY TEST: CPT

## 2022-07-06 PROCEDURE — 94729 DIFFUSING CAPACITY: CPT

## 2022-07-06 PROCEDURE — 36415 COLL VENOUS BLD VENIPUNCTURE: CPT

## 2022-07-06 RX ORDER — NAPROXEN 500 MG/1
500 TABLET ORAL
Qty: 14 | Refills: 0 | Status: DISCONTINUED | COMMUNITY
Start: 2022-06-22 | End: 2022-07-06

## 2022-07-06 NOTE — REVIEW OF SYSTEMS
[Fatigue] : fatigue [GERD] : gerd [Depression] : depression [Negative] : Allergy/Immunology [Fever] : no fever [Chills] : no chills [Anxiety] : no anxiety [Diabetes] : no diabetes [Thyroid Problem] : no thyroid problem [TextBox_30] : HPI [TextBox_44] : Hypertension [TextBox_83] : History of a renal cyst [TextBox_94] : Arthritis [TextBox_122] : Balance disorder

## 2022-07-06 NOTE — CONSULT LETTER
[Dear  ___] : Dear  [unfilled], [Please see my note below.] : Please see my note below. [Consult Closing:] : Thank you very much for allowing me to participate in the care of this patient.  If you have any questions, please do not hesitate to contact me. [Sincerely,] : Sincerely, [FreeTextEntry3] : Cholo Browning D.O., CODI\par  of Medicine\par [employer] [employer]\par

## 2022-07-06 NOTE — DISCUSSION/SUMMARY
[FreeTextEntry1] : COPD pulmonary physiology relatively stable on\par Obesity\par Pulmonary embolism\par Provoked pulmonary embolism dating back to early May 2022 with COVID-19 infection\par Status post chest wall trauma with left rib fractures\par Component of obesity and physical deconditioning contributing to exertional dyspnea\par Recommendations\par Baseline D-dimer\par As noted Coumadin adjustments per PMD\par I did explain that ideally oral anticoagulation with medication such as Eliquis or Xarelto would be easier without management of Coumadin dosing in therapeutic range but she is convinced that she had significant GI side effects with Xarelto \par I would recommend a minimum of 3 months for provoked PE but prefer a 6-month treatment protocol\par At 6 months can repeat a CT angiogram protocol and then if evidence of discontinuation for Coumadin can then follow-up with hypercoagulable work-up for completeness\par At present completed 2/6 months of anticoagulation\par \par Patient is requesting accommodations at work because of 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  My office follow-up 1 month\par

## 2022-07-06 NOTE — PHYSICAL EXAM
[No Acute Distress] : no acute distress [Normal Oropharynx] : normal oropharynx [II] : Mallampati Class: II [Normal Appearance] : normal appearance [Supple] : supple [No JVD] : no jvd [Normal Rate/Rhythm] : normal rate/rhythm [Normal S1, S2] : normal s1, s2 [No Murmurs] : no murmurs [No Resp Distress] : no resp distress [No Acc Muscle Use] : no acc muscle use [Normal Palpation] : normal palpation [Normal Rhythm and Effort] : normal rhythm and effort [Clear to Auscultation Bilaterally] : clear to auscultation bilaterally [Normal to Percussion] : normal to percussion [Benign] : benign [Not Tender] : not tender [Soft] : soft [No HSM] : no hsm [Normal Bowel Sounds] : normal bowel sounds [No Clubbing] : no clubbing [No Cyanosis] : no cyanosis [No Edema] : no edema [Normal Color/ Pigmentation] : normal color/ pigmentation [No Focal Deficits] : no focal deficits [Oriented x3] : oriented x3 [Normal Affect] : normal affect [TextBox_2] : Overweight [TextBox_99] : Ambulating with walker in a boot

## 2022-07-06 NOTE — PROCEDURE
[FreeTextEntry1] : PFT July 6, 2022\par Mild reduction in flow rates\par FVC 70% predicted\par TLC normal range 82% predicted\par RV/TLC ratio 141% predicted\par Diffusion 79% predicted\par Hemoglobin 13.7\par Impression air trapping\par Sawtooth pattern clinical correlation consistent with obstructive sleep apnea rule out noted that this patient has prior data dating back to February 4, 2007 there is some noted decline at the flow rates with stable diffusion and lung volumes\par \par Chest x-ray PA lateral\par Cardiac size normal\par Lung fields clear\par No parenchymal infiltrates pleural effusions or dominant pulmonary nodules\par No pneumothorax\par Pulmonary artery size grossly normal based on plain film

## 2022-07-06 NOTE — HISTORY OF PRESENT ILLNESS
[Former] : former [< 20 pack-years] : < 20 pack-years [TextBox_4] : 71-year-old female\par Pulmonary referral\par Complicated pulmonary history\par Chart reviewed\par History COPD on Y axilla\par Patient is status post [facility] May 7, 2022\par COVID positive\par CT scan of the chest demonstrated minimally of acute left lateral 5 through 7 ribs fracture within hematoma of the chest wall post fall on\par Also detected pulmonary embolism of the segmental arteries at the left lower lobe right lower lobe right upper lobe\par Ultrasound Doppler was negative for DVT\par Patient was initially sent home on Eliquis but it was too expensive and switched to Xarelto which patient states was giving her GI symptomatology with diarrhea and subsequently was switched to Coumadin\par Should be noted at today's visit the last INR on July 5, 2022 was 6.9 was advised to hold the Coumadin and then start 2 mg daily after 3 days with repeat evaluation\par Management per primary care physician\par Present is not complaining of any significant left-sided rib pain\par Cough additional complications include patient in a walking boot for Achilles tendinitis\par Also reports a prior history of pneumonia x2 remotely and bronchitis\par Does not describe any recent hospitalizations requiring steroids for COPD\par Has a subacute chronic cough\par Does not describe any wheezing chest congestion chest tightness at rest pleuritic chest pain at present hemoptysis\par No reported purulent sputum\par Patient states she was seen by cardiology reportedly had echocardiogram it was okay I did tell the cardiologist there was a component of exertional chest discomfort\par Patient received the COVID-vaccine Pfizer times 2+ a booster\par Reviewing her additional immunization profile she states flu shots are up-to-date\par She has previously received Prevnar 13 but not PCV 23\par Says has a very minimal tobacco history discontinued all tobacco dating back approximately 50 years\par No reported other history of chemical toxic inhalational exposures\par  [YearQuit] : 1973 [TextBox_19] : Reported positive history obstructive sleep apnea, insomnia

## 2022-07-07 ENCOUNTER — NON-APPOINTMENT (OUTPATIENT)
Age: 72
End: 2022-07-07

## 2022-07-07 LAB
ANION GAP SERPL CALC-SCNC: 9 MMOL/L
BASOPHILS # BLD AUTO: 0.04 K/UL
BASOPHILS NFR BLD AUTO: 0.3 %
BUN SERPL-MCNC: 21 MG/DL
CALCIUM SERPL-MCNC: 9.6 MG/DL
CHLORIDE SERPL-SCNC: 103 MMOL/L
CO2 SERPL-SCNC: 26 MMOL/L
CREAT SERPL-MCNC: 0.91 MG/DL
DEPRECATED D DIMER PPP IA-ACNC: <150 NG/ML DDU
EGFR: 67 ML/MIN/1.73M2
EOSINOPHIL # BLD AUTO: 0.24 K/UL
EOSINOPHIL NFR BLD AUTO: 2.1 %
GLUCOSE SERPL-MCNC: 86 MG/DL
HCT VFR BLD CALC: 41.7 %
HGB BLD-MCNC: 13.4 G/DL
IMM GRANULOCYTES NFR BLD AUTO: 0.7 %
LYMPHOCYTES # BLD AUTO: 0.91 K/UL
LYMPHOCYTES NFR BLD AUTO: 7.9 %
MAN DIFF?: NORMAL
MCHC RBC-ENTMCNC: 29.4 PG
MCHC RBC-ENTMCNC: 32.1 GM/DL
MCV RBC AUTO: 91.4 FL
MONOCYTES # BLD AUTO: 0.7 K/UL
MONOCYTES NFR BLD AUTO: 6 %
NEUTROPHILS # BLD AUTO: 9.62 K/UL
NEUTROPHILS NFR BLD AUTO: 83 %
PLATELET # BLD AUTO: 274 K/UL
POTASSIUM SERPL-SCNC: 4.2 MMOL/L
RBC # BLD: 4.56 M/UL
RBC # FLD: 13.2 %
SODIUM SERPL-SCNC: 138 MMOL/L
WBC # FLD AUTO: 11.59 K/UL

## 2022-07-11 ENCOUNTER — APPOINTMENT (OUTPATIENT)
Dept: FAMILY MEDICINE | Facility: CLINIC | Age: 72
End: 2022-07-11

## 2022-07-11 VITALS
BODY MASS INDEX: 39.34 KG/M2 | HEART RATE: 80 BPM | HEIGHT: 63 IN | OXYGEN SATURATION: 97 % | TEMPERATURE: 97 F | SYSTOLIC BLOOD PRESSURE: 120 MMHG | WEIGHT: 222 LBS | DIASTOLIC BLOOD PRESSURE: 82 MMHG

## 2022-07-11 PROCEDURE — 99214 OFFICE O/P EST MOD 30 MIN: CPT

## 2022-07-11 NOTE — ASSESSMENT
[FreeTextEntry1] : COVID & HOSPITALIZATION & PE\par went to [facility]. Where did a blood test, cxr, ct scan of the head, CT scan of the chest,abd,pelvis. Patient has all results on her phone(portal). \par CT scan of the chest from 05/07/22 showed - minimally acute L lateral 5th through 7th ribs fx with hematoma of the chest wall musculature. 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 Had a ct scan of the abd/pelvis, has a R renal cysts(not new as per patient), small umbillical hernia. Doppler u/s is neg for a DVT. \par \par Patient was d/c home on xarelto, percocet and robaxin. Initially was sent with [employer] but it is expensive, switch to xarelto). \par \par 6/21/22 patient no longer wants to take xarelto, feels it is giving her diarrhea, spoke to trauma surgeon who originally prescribed, at patients insistence will switch to coumadin, must come back in 7-10 days for INR check\par will start coumadin, in past was on 3 mg daily, will start with three and reassess \par 7/5/22  INR check - was placed on 3 mg daily as she was on in the past, but today inr was 6.9\par all the side affects that she was experiencing due to xarelto such as diarrhea has stopped\par will hold coumadin for three days, then start 2 mg daily and reassess in 3 weeks\par any issues, all falls, neuro symptoms, go directly to the ER\par 7/11/22  INR now 1.9 on 2 mg daily, will do 2 mg six days a week, sundays 3 mg, will recheck in 3 weeks\par \par left 5-7 rib fractures\par improved with muscle relaxer, will refill for patient\par is miserable every day\par \par achilles tendonitis\par saw ortho 6/27/22 in walking cam, doing well \par \par went to pulm 5/18/22 - Who gave patient new inhaler, wishes to switch to someone new, 7/6/22\par \par saw cardiologist 5/18/22 \par had ekg, walking tests\par \par balance issues/history of mini strokes\par refer to neuro, balance issues\par patient fell, fell forward, needs rolling walker for now until evaluated\par has rolling walker at home, will start to use\par 5/22 had fu with neurology, no acute changes, no intervention\par \par  tremor of right hand\par stopped when she stopped paxil\par \par lost sister 08/2021 from [place] after hospital stay\par son with testicular cancer\par \par OBESITY\par The diagnosis of obesity was discussed with the patient. The patient was counseled on diet and exercise. Patient was advised to eat a diet low in carbohydrates and low in fat with high protein diet with plenty of vegetables. Patient was counseled to eat small meals throughout the day avoiding carbohydrates, foods high in fat, foods that are fried and fast food.  Patient was advised to monitor fluid intake, to drink at least 8 glasses of pure water a day and reduce/stop intake of all sugar drinks including juice and soda. Patient was advised to try and exercise for 30-40 minutes per day for at least three days a week. Pros and cons of using medical management for weight loss versus diet/exercise alone was discussed. Medication options were provided for the patient and side affects and risks were identified and discussed.  Patient was advised to take any medications as prescribed and to call office or go to the ER immediately if any issues with the medications occur. All questions were answered.\par patient admits to emotional eating, fast food, cookies, junk food\par \par DEPRESSION/ANXIETY\par  Discussed diagnosis of anxiety and depression with the patient and potential outcomes/side affects of treatment versus non treatment. Medications were assessed and described at length. Side affects and black box warning were discussed. Patient was advised to continue will all medications prescribed and the need for compliance was discussed and emphasized. Patient was advised to not stop medications without discussing with a health care provider first. Patient was advised to continue psychotherapy or seek therapy if not currently attending. Patient was educated on addictive potential of controlled substances and was counseled to use only as needed and sparingly. Patient verbalized understanding of all the above.  due to covid and her situation living with her sister who is very difficult, financial issues, patient cannot sleep, is always unhappy, stressed, panic attacks, long family history of psych and mood disorders prior to the current situation, patient has long standing history of depression, anxiety and insomnia, this is severe acute on chronic issues, strong family history of psychiatric disorders reinforced patient must see psychotherapist for counseling - will call and make an appointment with the number I provided  \par realizes that she does not like klonopin, worked in the beginning, did not like how it made her feel, also failed prozac, failed effexor because it stopped working, failed lexapro , at this point will continue wellbutrin, which was helping, \par increased herself to wellbutrin 450 which helped, on buspar 30 bid\par hydroxyzine as needed\par seroquel did not help at night to sleep\par  trazodone at 150, 200 did not work, 300 is too much - hung over all day\par stopped paxil, tremors stopped, now on zoloft 100\par \par 1/24/22 - had three children, one who helps with finances, son out east, other daughter cant help financially, \par depressed, sad, unmotivated, unhappy, wants to watch tv all day, no interested in job or anything, feels alone, will increase zoloft to 100\par \par 7/5/22 more anxiety and depression lately due to current situation which is understandable, will try increasing to 150 daily and reassess\par suggest in patient evaluation, refuses at this time\par 7/11/22  on the zoloft 150, no difference yet\par \par INSOMNIA \par chronic, now in acute flare failed, ambien, benadryl, remeron, failed ambien CR, failed trazodone, failed tempazam, seroquel , tried klonipin 1-2 prior to bedtime, however did not like it due to morning after, failed zyprexa\par again not sleeping\par then failed seroquel 150, did 200 and slept a little, 300 was too much, does not wish to be on seroquel anymore\par  now with paxil, can sleep a little better\par   uses xanax as needed, does not combine\par 1/24/22 - ambien was helping, no longer, will try seroquel again\par 6/22  using trazodone\par \par COPD\par from years of smoking,  finally aggress to maintenance inhaler \par started advair and is feeling much better\par ventolin as needed\par \par ALLERGIES\par  singulair works a little, continue , added advair, but could not afford, gave other options,  saw pulmonologist - who also referred to cardiology prior to changing medications\par is on CPAP\par \par   HTN \par The patient has a diagnosis of hypertension.  The diagnosis was discussed with patient and need for medication compliance and possible side affects and risks of noncompliance. Patient was told to adhere to a low salt diet and try to incorporate exercise daily. monitor\par \par bilateral knee pain/chronic\par reports that she cannot get surgery because she has no one to help her at home.  mobic did not help\par patient already has had bilateral knee injections - miserable with pain, still needs to work\par using tramadol only as needed, would like a refill, identified that she must only use as needed\par needs to lose 30 pounds in order for surgeon to do surgery\par

## 2022-07-11 NOTE — HISTORY OF PRESENT ILLNESS
[de-identified] : 71 year old female is here for a followup visit. Patient is here for medication renewals and for blood work discussion. Medications and allergies were reviewed and assessed.  \par last visit switched to coumadin, here for a recheck \par many issues, fall, PE, had covid \par fu for mood, pain, anxiety, insomnia, balance issues\par patient with multiple issues to discuss\par son has testicular cancer\par lost sister 08/2021 from [place] after hospital stay\par  [Stopped use since ___] : No tobacco use since [unfilled] [Cigarettes ___ packs/day] : Patient smokes [unfilled] packs of cigarettes per day [___ Year(s)] : [unfilled] year(s) ago [Maintenance] : Maintenance: former smoker who stopped smoking  longer that 6 months ago but less that 5 years [Maintain commitment] : Maintain commitment [FreeTextEntry8] : \par \par

## 2022-07-11 NOTE — REVIEW OF SYSTEMS
[Insomnia] : insomnia [Anxiety] : anxiety [Depression] : depression [Negative] : Heme/Lymph [FreeTextEntry9] : knee pain - bilateral , left achilles tendonitis [de-identified] : unbalance [de-identified] : severe/chronic

## 2022-07-11 NOTE — HEALTH RISK ASSESSMENT
[Former] : Former [No] : No [No falls in past year] : Patient reported no falls in the past year [2] : 2) Feeling down, depressed, or hopeless for more than half of the days (2) [PHQ-9 Positive] : PHQ-9 Positive [I have developed a follow-up plan documented below in the note.] : I have developed a follow-up plan documented below in the note. [SMB4Yamks] : 4 [Very Good] : ~his/her~ current health as very good [Fair] :  ~his/her~ mood as fair [Patient declined mammogram] : Patient declined mammogram [Patient declined PAP Smear] : Patient declined PAP Smear [Patient declined colonoscopy] : Patient declined colonoscopy [HIV Test offered] : HIV Test offered [Hepatitis C test offered] : Hepatitis C test offered [With Family] : lives with family [] :  [# Of Children ___] : has [unfilled] children [Fully functional (bathing, dressing, toileting, transferring, walking, feeding)] : Fully functional (bathing, dressing, toileting, transferring, walking, feeding) [Fully functional (using the telephone, shopping, preparing meals, housekeeping, doing laundry, using] : Fully functional and needs no help or supervision to perform IADLs (using the telephone, shopping, preparing meals, housekeeping, doing laundry, using transportation, managing medications and managing finances) [Smoke Detector] : smoke detector [Seat Belt] :  uses seat belt [de-identified] : lives with sister [FreeTextEntry3] : passed 2004

## 2022-07-11 NOTE — PHYSICAL EXAM
[Well Nourished] : well nourished [Well Developed] : well developed [Well-Appearing] : well-appearing [Clear to Auscultation] : lungs were clear to auscultation bilaterally [Regular Rhythm] : with a regular rhythm [Normal S1, S2] : normal S1 and S2 [Normal Affect] : the affect was normal [Alert and Oriented x3] : oriented to person, place, and time [Normal Insight/Judgement] : insight and judgment were intact [de-identified] : in walking boot, on the left, requires rolling walker

## 2022-07-22 ENCOUNTER — APPOINTMENT (OUTPATIENT)
Dept: FAMILY MEDICINE | Facility: CLINIC | Age: 72
End: 2022-07-22

## 2022-07-22 VITALS
HEIGHT: 63 IN | HEART RATE: 72 BPM | DIASTOLIC BLOOD PRESSURE: 68 MMHG | TEMPERATURE: 97 F | SYSTOLIC BLOOD PRESSURE: 120 MMHG | WEIGHT: 222 LBS | OXYGEN SATURATION: 95 % | BODY MASS INDEX: 39.34 KG/M2

## 2022-07-22 LAB
INR PPP: 1.9 RATIO
INR PPP: 2.9 RATIO
POCT-PROTHROMBIN TIME: 22.5 SECS
POCT-PROTHROMBIN TIME: 34.3 SECS
QUALITY CONTROL: YES

## 2022-07-22 PROCEDURE — 85610 PROTHROMBIN TIME: CPT | Mod: QW

## 2022-07-22 PROCEDURE — 99214 OFFICE O/P EST MOD 30 MIN: CPT | Mod: 25

## 2022-07-22 NOTE — PHYSICAL EXAM
[Well Nourished] : well nourished [Well Developed] : well developed [Well-Appearing] : well-appearing [Clear to Auscultation] : lungs were clear to auscultation bilaterally [Regular Rhythm] : with a regular rhythm [Normal S1, S2] : normal S1 and S2 [Normal Affect] : the affect was normal [Alert and Oriented x3] : oriented to person, place, and time [Normal Insight/Judgement] : insight and judgment were intact [de-identified] : in walking boot, on the left, requires rolling walker

## 2022-07-22 NOTE — HISTORY OF PRESENT ILLNESS
[Stopped use since ___] : No tobacco use since [unfilled] [Cigarettes ___ packs/day] : Patient smokes [unfilled] packs of cigarettes per day [___ Year(s)] : [unfilled] year(s) ago [Maintenance] : Maintenance: former smoker who stopped smoking  longer that 6 months ago but less that 5 years [Maintain commitment] : Maintain commitment [de-identified] : 71 year old female is here for a followup visit. Patient is here for medication renewals and for blood work discussion. Medications and allergies were reviewed and assessed.  \par last visit switched to coumadin, here for a recheck \par many issues, fall, PE, had covid \par fu for mood, pain, anxiety, insomnia, balance issues\par patient with multiple issues to discuss\par son has testicular cancer\par lost sister 08/2021 from [place] after hospital stay\par  [FreeTextEntry8] : \par \par

## 2022-07-22 NOTE — REVIEW OF SYSTEMS
[Insomnia] : insomnia [Anxiety] : anxiety [Depression] : depression [Negative] : Heme/Lymph [FreeTextEntry9] : knee pain - bilateral , left Achilles' tendonitis [de-identified] : severe/chronic

## 2022-07-22 NOTE — ASSESSMENT
[FreeTextEntry1] : COVID & HOSPITALIZATION & PE\par went to [facility]. Where did a blood test, cxr, ct scan of the head, CT scan of the chest,abd,pelvis. Patient has all results on her phone(portal). \par CT scan of the chest from 05/07/22 showed - minimally acute L lateral 5th through 7th ribs fx with hematoma of the chest wall musculature. 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 Had a ct scan of the abd/pelvis, has a R renal cysts(not new as per patient), small umbillical hernia. Doppler u/s is neg for a DVT. \par \par Patient was d/c home on xarelto, percocet and robaxin. Initially was sent with [facility] but it is expensive, switch to xarelto). \par \par 6/21/22 patient no longer wants to take xarelto, feels it is giving her diarrhea, spoke to trauma surgeon who originally prescribed, at patients insistence will switch to coumadin, must come back in 7-10 days for INR check\par will start coumadin, in past was on 3 mg daily, will start with three and reassess \par 7/5/22  INR check - was placed on 3 mg daily as she was on in the past, but today inr was 6.9\par all the side affects that she was experiencing due to xarelto such as diarrhea has stopped\par will hold coumadin for three days, then start 2 mg daily and reassess in 3 weeks\par any issues, all falls, neuro symptoms, go directly to the ER\par 7/11/22  INR now 1.9 on 2 mg daily, will do 2 mg six days a week, sundays 3 mg, will recheck in 3 weeks\par 7/22/22  INR 2.9, 2 mg six days a week, 3 mg sunday, fu in 4 weeks\par \par left 5-7 rib fractures\par improved with muscle relaxer, will refill for patient\par \par achilles tendonitis\par saw ortho 6/27/22 in walking cam, seeing ortho next week \par \par went to pulm 5/18/22 - Who gave patient new inhaler, wishes to switch to someone new, 7/6/22\par 7/6/22 - saw pulm, felt he did a great job, is confident in her evaluation\par \par saw cardiologist 5/18/22 \par had ekg, walking tests\par \par balance issues/history of mini strokes\par refer to neuro, balance issues\par patient fell, fell forward, needs rolling walker for now until evaluated\par has rolling walker at home, will start to use\par 5/22 had fu with neurology, no acute changes, no intervention\par \par  tremor of right hand\par stopped when she stopped paxil\par \par OBESITY\par The diagnosis of obesity was discussed with the patient. The patient was counseled on diet and exercise. Patient was advised to eat a diet low in carbohydrates and low in fat with high protein diet with plenty of vegetables. Patient was counseled to eat small meals throughout the day avoiding carbohydrates, foods high in fat, foods that are fried and fast food.  Patient was advised to monitor fluid intake, to drink at least 8 glasses of pure water a day and reduce/stop intake of all sugar drinks including juice and soda. Patient was advised to try and exercise for 30-40 minutes per day for at least three days a week. Pros and cons of using medical management for weight loss versus diet/exercise alone was discussed. Medication options were provided for the patient and side affects and risks were identified and discussed.  Patient was advised to take any medications as prescribed and to call office or go to the ER immediately if any issues with the medications occur. All questions were answered.\par patient admits to emotional eating, fast food, cookies, junk food\par \par DEPRESSION/ANXIETY\par  Discussed diagnosis of anxiety and depression with the patient and potential outcomes/side affects of treatment versus non treatment. Medications were assessed and described at length. Side affects and black box warning were discussed. Patient was advised to continue will all medications prescribed and the need for compliance was discussed and emphasized. Patient was advised to not stop medications without discussing with a health care provider first. Patient was advised to continue psychotherapy or seek therapy if not currently attending. Patient was educated on addictive potential of controlled substances and was counseled to use only as needed and sparingly. Patient verbalized understanding of all the above.  due to covid and her situation living with her sister who is very difficult, financial issues, patient cannot sleep, is always unhappy, stressed, panic attacks, long family history of psych and mood disorders prior to the current situation, patient has long standing history of depression, anxiety and insomnia, this is severe acute on chronic issues, strong family history of psychiatric disorders reinforced patient must see psychotherapist for counseling - will call and make an appointment with the number I provided  \par realizes that she does not like klonopin, worked in the beginning, did not like how it made her feel, also failed prozac, failed effexor because it stopped working, failed lexapro , at this point will continue wellbutrin, which was helping, \par increased herself to wellbutrin 450 which helped, on buspar 30 bid\par hydroxyzine as needed\par seroquel did not help at night to sleep\par  trazodone at 150, 200 did not work, 300 is too much - hung over all day\par stopped paxil, tremors stopped, now on zoloft 100\par \par 1/24/22 - had three children, one who helps with finances, son out east, other daughter cant help financially, \par depressed, sad, unmotivated, unhappy, wants to watch tv all day, no interested in job or anything, feels alone, will increase zoloft to 100\par \par 7/5/22 more anxiety and depression lately due to current situation which is understandable, will try increasing to 150 daily and reassess\par suggest in patient evaluation, refuses at this time\par 7/11/22  on the zoloft 150, no difference yet\par \par INSOMNIA \par chronic, now in acute flare failed, ambien, benadryl, remeron, failed ambien CR, failed trazodone, failed tempazam, seroquel , tried klonipin 1-2 prior to bedtime, however did not like it due to morning after, failed zyprexa\par again not sleeping\par then failed seroquel 150, did 200 and slept a little, 300 was too much, does not wish to be on seroquel anymore\par  now with paxil, can sleep a little better\par   uses xanax as needed, does not combine\par 1/24/22 - ambien was helping, no longer, will try seroquel again\par 6/22  using trazodone\par \par COPD\par from years of smoking,  finally aggress to maintenance inhaler \par started advair and is feeling much better\par ventolin as needed\par \par ALLERGIES\par  singulair works a little, continue , added advair, but could not afford, gave other options,  saw pulmonologist - who also referred to cardiology prior to changing medications\par is on CPAP\par \par   HTN \par The patient has a diagnosis of hypertension.  The diagnosis was discussed with patient and need for medication compliance and possible side affects and risks of noncompliance. Patient was told to adhere to a low salt diet and try to incorporate exercise daily. monitor\par \par bilateral knee pain/chronic\par reports that she cannot get surgery because she has no one to help her at home.  mobic did not help\par patient already has had bilateral knee injections - miserable with pain, still needs to work\par using tramadol only as needed, would like a refill, identified that she must only use as needed\par needs to lose 30 pounds in order for surgeon to do surgery\par

## 2022-07-25 ENCOUNTER — APPOINTMENT (OUTPATIENT)
Dept: ORTHOPEDIC SURGERY | Facility: CLINIC | Age: 72
End: 2022-07-25

## 2022-07-25 DIAGNOSIS — M76.61 ACHILLES TENDINITIS, RIGHT LEG: ICD-10-CM

## 2022-07-25 PROCEDURE — 99213 OFFICE O/P EST LOW 20 MIN: CPT

## 2022-07-25 NOTE — HISTORY OF PRESENT ILLNESS
[5] : 5 [3] : 3 [Localized] : localized [Sharp] : sharp [Intermittent] : intermittent [Household chores] : household chores [Leisure] : leisure [Social interactions] : social interactions [Rest] : rest [Standing] : standing [Walking] : walking [Stairs] : stairs [de-identified] : Pt. presents for f/u L Achilles tendinitis from June 2022. Since last visit she continues to WB in CAM boot, using walker. Medrol dose pack, heel lifts and PT were recommended at last visit. Reports improvement overall but still some intermittent pain.  [] : Post Surgical Visit: no [FreeTextEntry1] : L foot [FreeTextEntry7] : leg

## 2022-07-25 NOTE — ASSESSMENT
[FreeTextEntry1] : D/C CAM boot.\par WBAT in supportive footwear.\par Continue PT.\par Ice to affected area.

## 2022-07-25 NOTE — PHYSICAL EXAM
[Mild] : mild diffused ankle swelling [NL (40)] : plantar flexion 40 degrees [4___] : plantar flexion 4[unfilled]/5 [5___] : eversion 5[unfilled]/5 [2+] : posterior tibialis pulse: 2+ [Normal] : saphenous nerve sensation normal [Left] : left ankle [There are no fractures, subluxations or dislocations. No significant abnormalities are seen] : There are no fractures, subluxations or dislocations. No significant abnormalities are seen [FreeTextEntry9] : Small achilles insertional/plantar calcaneal spurs [NL 30)] : inversion 30 degrees [NL (20)] : eversion 20 degrees [] : no pain when stressing lateral tarsal metatarsal joint [FreeTextEntry3] : Focal thickening in the achilles tendon watershed area. [de-identified] : WB in short CAM boot.  [de-identified] : inversion 20 degrees [de-identified] : eversion 15 degrees [TWNoteComboBox7] : dorsiflexion 10 degrees

## 2022-08-02 ENCOUNTER — RX RENEWAL (OUTPATIENT)
Age: 72
End: 2022-08-02

## 2022-08-04 ENCOUNTER — APPOINTMENT (OUTPATIENT)
Dept: PULMONOLOGY | Facility: CLINIC | Age: 72
End: 2022-08-04

## 2022-08-04 VITALS
TEMPERATURE: 98.2 F | HEART RATE: 75 BPM | OXYGEN SATURATION: 95 % | SYSTOLIC BLOOD PRESSURE: 99 MMHG | DIASTOLIC BLOOD PRESSURE: 63 MMHG

## 2022-08-04 PROCEDURE — 94618 PULMONARY STRESS TESTING: CPT

## 2022-08-04 PROCEDURE — 99214 OFFICE O/P EST MOD 30 MIN: CPT | Mod: CS,25

## 2022-08-04 NOTE — DISCUSSION/SUMMARY
[FreeTextEntry1] : COPD pulmonary physiology relatively stable \par Obesity\par Pulmonary embolism\par Provoked pulmonary embolism dating back to early May 2022 with COVID-19 infection\par Gating issue based on additional historical data and chart review this is the second provoked pulmonary embolism with a prior dating back to 2016\par Status post chest wall trauma with left rib fractures\par Component of obesity and physical deconditioning contributing to exertional dyspnea\par Recommendations\par Issue based on 2 pulmonary embolisms whether patient should receive long-term anticoagulation as opposed to just a 6-month protocol\par Discussed in detail with patient on\par If long-term anticoagulation was indicated with prefer to use low-dose Xarelto 10 mg as opposed to Coumadin with its difficulties in maintaining therapeutic INR and this was discussed with patient\par In addition we will hold off but eventually patient will get an extensive hypercoagulable work-up in the office\par At 6 months we will also repeat a CT angiogram protocol to confirm clot clearance and a lower extremity DVT and then make final decision with input from primary care physician\par Baseline D-dimer nl\par As noted Coumadin adjustments per PMD\par I did explain that ideally oral anticoagulation with medication such as Eliquis or Xarelto would be easier without management of Coumadin dosing in therapeutic range but she is convinced that she had significant GI side effects with Xarelto \par At 6 months can repeat a CT angiogram protocol and then if evidence of discontinuation for Coumadin can then follow-up with hypercoagulable work-up for completeness\par At present completed 3/6 months of anticoagulation\par \par Patient is requesting accommodations at work because of 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  My office follow-up 1 month\par

## 2022-08-04 NOTE — HISTORY OF PRESENT ILLNESS
[Former] : former [< 20 pack-years] : < 20 pack-years [TextBox_4] : 71-year-old female\par Pulmonary referral\par Additional information provided at today's visit including a more detailed chart review available with notes provided dating back to 2016 and\par Patient had an orthopedic surgical procedure which was complicated by a postoperative provoked pulmonary embolism\par This by definition and is the second provoked pulmonary embolism post COVID-19 infection\par Complicated pulmonary history\par nocturnal cough\par Chart reviewed\par History COPD \par Patient is status post [facility] May 7, 2022\par COVID positive\par CT scan of the chest demonstrated minimally of acute left lateral 5 through 7 ribs fracture within hematoma of the chest wall post fall on\par Also detected pulmonary embolism of the segmental arteries at the left lower lobe right lower lobe right upper lobe\par Ultrasound Doppler was negative for DVT\par Patient was initially sent home on Eliquis but it was too expensive and switched to Xarelto which patient states was giving her GI symptomatology with diarrhea and subsequently was switched to Coumadin\par Should be noted at today's visit the last INR on July 5, 2022 was 6.9 was advised to hold the Coumadin and then start 2 mg daily after 3 days with repeat evaluation\par Management per primary care physician\par Present is not complaining of any significant left-sided rib pain\par Cough additional complications include patient in a walking boot for Achilles tendinitis\par Also reports a prior history of pneumonia x2 remotely and bronchitis\par Does not describe any recent hospitalizations requiring steroids for COPD\par Has a subacute chronic cough\par Does not describe any wheezing chest congestion chest tightness at rest pleuritic chest pain at present hemoptysis\par No reported purulent sputum\par Patient states she was seen by cardiology reportedly had echocardiogram it was okay I did tell the cardiologist there was a component of exertional chest discomfort\par Patient received the COVID-vaccine Pfizer times 2+ a booster\par Reviewing her additional immunization profile she states flu shots are up-to-date\par She has previously received Prevnar 13 but not PCV 23\par Says has a very minimal tobacco history discontinued all tobacco dating back approximately 50 years\par No reported other history of chemical toxic inhalational exposures\par  [YearQuit] : 1972 [TextBox_19] : Reported positive history obstructive sleep apnea, insomnia

## 2022-08-04 NOTE — CONSULT LETTER
[Dear  ___] : Dear  [unfilled], [Please see my note below.] : Please see my note below. [Consult Closing:] : Thank you very much for allowing me to participate in the care of this patient.  If you have any questions, please do not hesitate to contact me. [Sincerely,] : Sincerely, [FreeTextEntry3] : Cholo Browning D.O., CODI\par  of Medicine\par [facility] [employer]\par

## 2022-08-04 NOTE — PROCEDURE
[FreeTextEntry1] : Pul 6  kin exercise study  8/4/2\par  amulated with walker\par  RA O2 sat 96 % \par No RA desaturation\par \par PFT July 6, 2022\par Mild reduction in flow rates\par FVC 70% predicted\par TLC normal range 82% predicted\par RV/TLC ratio 141% predicted\par Diffusion 79% predicted\par Hemoglobin 13.7\par Impression air trapping\par Sawtooth pattern clinical correlation consistent with obstructive sleep apnea rule out noted that this patient has prior data dating back to February 4, 2007 there is some noted decline at the flow rates with stable diffusion and lung volumes\par \par Chest x-ray PA lateral 7/6/22\par Cardiac size normal\par Lung fields clear\par No parenchymal infiltrates pleural effusions or dominant pulmonary nodules\par No pneumothorax\par Pulmonary artery size grossly normal based on plain film

## 2022-08-15 ENCOUNTER — TRANSCRIPTION ENCOUNTER (OUTPATIENT)
Age: 72
End: 2022-08-15

## 2022-08-15 ENCOUNTER — APPOINTMENT (OUTPATIENT)
Dept: PULMONOLOGY | Facility: CLINIC | Age: 72
End: 2022-08-15

## 2022-08-15 PROCEDURE — 95800 SLP STDY UNATTENDED: CPT

## 2022-08-16 ENCOUNTER — APPOINTMENT (OUTPATIENT)
Dept: ORTHOPEDIC SURGERY | Facility: CLINIC | Age: 72
End: 2022-08-16

## 2022-08-16 VITALS — BODY MASS INDEX: 39.34 KG/M2 | WEIGHT: 222 LBS | HEIGHT: 63 IN

## 2022-08-16 DIAGNOSIS — M77.8 OTHER ENTHESOPATHIES, NOT ELSEWHERE CLASSIFIED: ICD-10-CM

## 2022-08-16 PROCEDURE — 20610 DRAIN/INJ JOINT/BURSA W/O US: CPT | Mod: 50

## 2022-08-16 PROCEDURE — J3490M: CUSTOM

## 2022-08-16 PROCEDURE — 99214 OFFICE O/P EST MOD 30 MIN: CPT | Mod: 25

## 2022-08-16 NOTE — REASON FOR VISIT
[FreeTextEntry2] : Patient is here for a Follow Up appointment for the Right Shoulder and Both Knees.

## 2022-08-16 NOTE — ASSESSMENT
[FreeTextEntry1] : right knee pain with history of oa. pain worse\par left knee pain. mri 10 2018 shows mmt and oa. \par right shoulder pain with possible cuff tear. \par left shoulder pain. history of left shoulder cuff repair on 2/26/2016 done by me.\par \par pmhx: depression, anxiety, copd, htn, now on blood thinners for PE.\par history of dvt LUE postop - resolved.

## 2022-08-16 NOTE — PHYSICAL EXAM
[4 ___] : forward flexion 4[unfilled]/5 [Right] : right knee [Left] : left knee [NL (0)] : extension 0 degrees [4___] : quadriceps 4[unfilled]/5 [5___] : hamstring 5[unfilled]/5 [TWNoteComboBox6] : internal rotation L5 [de-identified] : external rotation 25 degrees [] : no guarding during exam [TWNoteComboBox7] : flexion 120 degrees

## 2022-08-16 NOTE — HISTORY OF PRESENT ILLNESS
[9] : 9 [Constant] : constant [de-identified] : 8/16/22: right shoulder and bilateral knee pain back again. [FreeTextEntry1] : right shoulder and both knees

## 2022-08-16 NOTE — PROCEDURE
[Large Joint Injection] : Large joint injection [Bilateral] : bilaterally of the [Knee] : knee [Pain] : pain [Inflammation] : inflammation [Alcohol] : alcohol [Betadine] : betadine [___ cc    3mg] :  Betamethasone (Celestone) ~Vcc of 3mg [___ cc    1%] : Lidocaine ~Vcc of 1%  [___ cc    0.25%] : Bupivacaine (Marcaine) ~Vcc of 0.25%  [] : Patient tolerated procedure well [Call if redness, pain or fever occur] : call if redness, pain or fever occur [Apply ice for 15min out of every hour for the next 12-24 hours as tolerated] : apply ice for 15 minutes out of every hour for the next 12-24 hours as tolerated [Patient was advised to rest the joint(s) for ____ days] : patient was advised to rest the joint(s) for [unfilled] days [Previous OTC use and PT nontherapeutic] : patient has tried OTC's including aspirin, Ibuprofen, Aleve, etc or prescription NSAIDS, and/or exercises at home and/or physical therapy without satisfactory response [Patient had decreased mobility in the joint] : patient had decreased mobility in the joint [Risks, benefits, alternatives discussed / Verbal consent obtained] : the risks benefits, and alternatives have been discussed, and verbal consent was obtained

## 2022-08-17 PROCEDURE — 95800 SLP STDY UNATTENDED: CPT

## 2022-08-22 ENCOUNTER — APPOINTMENT (OUTPATIENT)
Dept: ORTHOPEDIC SURGERY | Facility: CLINIC | Age: 72
End: 2022-08-22

## 2022-08-22 VITALS — WEIGHT: 222 LBS | BODY MASS INDEX: 39.34 KG/M2 | HEIGHT: 63 IN

## 2022-08-22 PROCEDURE — 99213 OFFICE O/P EST LOW 20 MIN: CPT

## 2022-08-22 NOTE — HISTORY OF PRESENT ILLNESS
[5] : 5 [3] : 3 [Localized] : localized [Sharp] : sharp [Intermittent] : intermittent [Household chores] : household chores [Leisure] : leisure [Social interactions] : social interactions [Rest] : rest [Standing] : standing [Walking] : walking [Stairs] : stairs [de-identified] : Pt. presents for f/u L Achilles tendinitis from June 2022. She has been going to PT and reports definite improvement.  She still has some mild intermittent pain.  [] : Post Surgical Visit: no [FreeTextEntry1] : L foot [FreeTextEntry7] : leg

## 2022-08-22 NOTE — PHYSICAL EXAM
[Left] : left foot and ankle [Mild] : mild diffused ankle swelling [NL (40)] : plantar flexion 40 degrees [NL 30)] : inversion 30 degrees [NL (20)] : eversion 20 degrees [4___] : plantar flexion 4[unfilled]/5 [5___] : eversion 5[unfilled]/5 [2+] : posterior tibialis pulse: 2+ [Normal] : saphenous nerve sensation normal [] : no pain when stressing lateral tarsal metatarsal joint [FreeTextEntry3] : Much lessl thickening in the achilles tendon watershed area. [de-identified] : WB in short CAM boot.

## 2022-08-23 ENCOUNTER — APPOINTMENT (OUTPATIENT)
Dept: FAMILY MEDICINE | Facility: CLINIC | Age: 72
End: 2022-08-23

## 2022-08-23 VITALS
SYSTOLIC BLOOD PRESSURE: 100 MMHG | BODY MASS INDEX: 39.34 KG/M2 | HEART RATE: 67 BPM | WEIGHT: 222 LBS | TEMPERATURE: 98.2 F | OXYGEN SATURATION: 95 % | DIASTOLIC BLOOD PRESSURE: 70 MMHG | HEIGHT: 63 IN

## 2022-08-23 LAB
INR PPP: 1.6 RATIO
POCT-PROTHROMBIN TIME: 19.6 SECS

## 2022-08-23 PROCEDURE — 99213 OFFICE O/P EST LOW 20 MIN: CPT | Mod: 25

## 2022-08-23 PROCEDURE — 85610 PROTHROMBIN TIME: CPT | Mod: QW

## 2022-09-02 ENCOUNTER — APPOINTMENT (OUTPATIENT)
Dept: PULMONOLOGY | Facility: CLINIC | Age: 72
End: 2022-09-02

## 2022-09-02 VITALS
TEMPERATURE: 98 F | DIASTOLIC BLOOD PRESSURE: 68 MMHG | HEART RATE: 64 BPM | OXYGEN SATURATION: 95 % | SYSTOLIC BLOOD PRESSURE: 113 MMHG

## 2022-09-02 LAB
INR PPP: 1.7 RATIO
POCT-PROTHROMBIN TIME: 20.6 SECS
QUALITY CONTROL: YES

## 2022-09-02 PROCEDURE — 99214 OFFICE O/P EST MOD 30 MIN: CPT | Mod: 25

## 2022-09-02 PROCEDURE — 85610 PROTHROMBIN TIME: CPT | Mod: QW

## 2022-09-02 NOTE — PROCEDURE
[FreeTextEntry1] : POCT 9/2/22\par PT INR 1.7\par \par Sleep study\par August 15 August 72,022\par Severe obstructive sleep apnea\par AHI 33\par Positional component\par Insufficient nonsupine study time\par Present time less than 90% saturation on room air 13.4%\par Greater than 30 dB 63%\par Sigifredo desaturation 77.8%\par Impression severe obstructive sleep apnea\par Address treatment protocol focus primarily on CPAP\par On treatment with CPAP we will then require overnight oximetry to use to evaluate for the noted severe oxygen desaturation also can address an attended titration study in view of the severe oxygen desaturation as an alternative\par \par Pulmonary 6 minute exercise study  8/4/2\par ambulatedwith walker\par  RA O2 sat 96 % \par No RA desaturation\par \par PFT July 6, 2022\par Mild reduction in flow rates\par FVC 70% predicted\par TLC normal range 82% predicted\par RV/TLC ratio 141% predicted\par Diffusion 79% predicted\par Hemoglobin 13.7\par Impression air trapping\par Sawtooth pattern clinical correlation consistent with obstructive sleep apnea rule out noted that this patient has prior data dating back to February 4, 2007 there is some noted decline at the flow rates with stable diffusion and lung volumes\par \par Chest x-ray PA lateral 7/6/22\par Cardiac size normal\par Lung fields clear\par No parenchymal infiltrates pleural effusions or dominant pulmonary nodules\par No pneumothorax\par Pulmonary artery size grossly normal based on plain film

## 2022-09-02 NOTE — HISTORY OF PRESENT ILLNESS
[Former] : former [< 20 pack-years] : < 20 pack-years [TextBox_4] : 71-year-old female\par since last visit required in hot weather required LIAN with chest congestion with relief\par No fever or sputum\par NO pleuritic CP \par Sleep study -severe XENIA\par Pulmonary referral\par Additional information provided at today's visit including a more detailed chart review available with notes provided dating back to 2016 and\par Patient had an orthopedic surgical procedure which was complicated by a postoperative provoked pulmonary embolism\par This by definition and is the second provoked pulmonary embolism post COVID-19 infection\par Complicated pulmonary history\par nocturnal cough\par Chart reviewed\par History COPD \par Patient is status post [facility] May 7, 2022\par COVID positive\par CT scan of the chest demonstrated minimally of acute left lateral 5 through 7 ribs fracture within hematoma of the chest wall post fall on\par Also detected pulmonary embolism of the segmental arteries at the left lower lobe right lower lobe right upper lobe\par Ultrasound Doppler was negative for DVT\par Patient was initially sent home on Eliquis but it was too expensive and switched to Xarelto which patient states was giving her GI symptomatology with diarrhea and subsequently was switched to Coumadin\par Should be noted at today's visit the last INR on July 5, 2022 was 6.9 was advised to hold the Coumadin and then start 2 mg daily after 3 days with repeat evaluation\par Management per primary care physician\par Present is not complaining of any significant left-sided rib pain\par Cough additional complications include patient in a walking boot for Achilles tendinitis\par Also reports a prior history of pneumonia x2 remotely and bronchitis\par Does not describe any recent hospitalizations requiring steroids for COPD\par Has a subacute chronic cough\par Does not describe any wheezing chest congestion chest tightness at rest pleuritic chest pain at present hemoptysis\par No reported purulent sputum\par Patient states she was seen by cardiology reportedly had echocardiogram it was okay I did tell the cardiologist there was a component of exertional chest discomfort\par Patient received the COVID-vaccine Pfizer times 2+ a booster\par Reviewing her additional immunization profile she states flu shots are up-to-date\par She has previously received Prevnar 13 but not PCV 23\par Says has a very minimal tobacco history discontinued all tobacco dating back approximately 50 years\par No reported other history of chemical toxic inhalational exposures\par  [YearQuit] : 1972 [TextBox_19] : Reported positive history obstructive sleep apnea, insomnia

## 2022-09-02 NOTE — CONSULT LETTER
[Dear  ___] : Dear  [unfilled], [Please see my note below.] : Please see my note below. [Consult Closing:] : Thank you very much for allowing me to participate in the care of this patient.  If you have any questions, please do not hesitate to contact me. [Sincerely,] : Sincerely, [FreeTextEntry3] : Cholo Browning D.O., CODI\par  of Medicine\par [facility] [employer]\par

## 2022-09-02 NOTE — DISCUSSION/SUMMARY
[FreeTextEntry1] : COPD pulmonary physiology relatively stable \par Obesity\par Pulmonary embolism\par Provoked pulmonary embolism dating back to early May 2022 with COVID-19 infection\par Gating issue based on additional historical data and chart review this is the second provoked pulmonary embolism with a prior dating back to 2016\par Status post chest wall trauma with left rib fractures\par Component of obesity and physical deconditioning contributing to exertional dyspnea\par Recommendations\par Issue based on 2 pulmonary embolisms whether patient should receive long-term anticoagulation as opposed to just a 6-month protocol\par Discussed in detail with patient on\par If long-term anticoagulation was indicated with prefer to use low-dose Xarelto 10 mg as opposed to Coumadin with its difficulties in maintaining therapeutic INR and this was discussed with patient\par In addition we will hold off but eventually patient will get an extensive hypercoagulable work-up in the office\par At 6 months we will also repeat a CT angiogram protocol to confirm clot clearance and a lower extremity DVT and then make final decision with input from primary care physician\par Baseline D-dimer nl\par As noted Coumadin adjustments per PMD\par I did explain that ideally oral anticoagulation with medication such as Eliquis or Xarelto would be easier without management of Coumadin dosing in therapeutic range but she is convinced that she had significant GI side effects with Xarelto \par At 6 months can repeat a CT angiogram protocol and then if evidence of discontinuation for Coumadin can then follow-up with hypercoagulable work-up for completeness\par At present completed 4/6 months of anticoagulation\par \par Patient is requesting accommodations at work because of 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  My office follow-up 1 month\par  Order RESMED CPAP \par Setting CPAP 8.0 cm H20 Nasal Pillow\par F/u Overnight Oximetry\par Increase Coumadin  3  mg QD until next INR

## 2022-09-06 ENCOUNTER — RX RENEWAL (OUTPATIENT)
Age: 72
End: 2022-09-06

## 2022-09-13 ENCOUNTER — APPOINTMENT (OUTPATIENT)
Dept: ORTHOPEDIC SURGERY | Facility: CLINIC | Age: 72
End: 2022-09-13

## 2022-09-13 VITALS — BODY MASS INDEX: 39.34 KG/M2 | HEIGHT: 63 IN | WEIGHT: 222 LBS

## 2022-09-13 PROCEDURE — 99214 OFFICE O/P EST MOD 30 MIN: CPT | Mod: 25

## 2022-09-13 PROCEDURE — 20610 DRAIN/INJ JOINT/BURSA W/O US: CPT | Mod: 50

## 2022-09-13 NOTE — ASSESSMENT
[FreeTextEntry1] : right knee pain with history of oa. pain worse.  csi 8/16/22 with temp relief. \par left knee pain. mri 10 2018 shows mmt and oa.   csi 8/16/22 with temp relief. \par right shoulder pain with possible cuff tear. \par left shoulder pain. history of left shoulder cuff repair on 2/26/2016 done by me.\par \par pmhx: depression, anxiety, copd, htn, now on blood thinners for PE.\par history of dvt LUE postop - resolved.\par \par patient gets tramadol from pcp for chronic pain  - discussed pain management consult with patient.

## 2022-09-13 NOTE — PROCEDURE
[FreeTextEntry3] : Procedure Name: Euflexxa (Large Joint)\par Viscosupplementation Injection: X-ray evidence of Osteoarthritis on this or prior visit and Patient has tried OTC's including aspirin, Ibuprofen, Aleve etc or prescription NSAIDS, and/or exercises at home and/ or physical therapy without satisfactory response.  The risks, benefits, and alternatives to Viscosupplementation injection were explained in full to the patient. Risks outlined include but are not limited to infection, sepsis, bleeding, scarring, skin discoloration, temporary increase in pain, syncopal episode, failure to resolve symptoms, allergic reaction, and symptom recurrence. Signs and symptoms of infection reviewed and patient advised to call immediately for redness, fevers, and/or chills. Patient understood the risks. All questions were answered. \par \par Oral informed consent was obtained.   Sterile technique was utilized for the procedure including the preparation of the solutions used for the injection. An injection of Euflexxa 2ml #1 was injected into BILATERAL KNEES.  Patient tolerated the procedure well. Advised to ice the injection site this evening.  Post Procedure Instructions: Patient was advised to call if redness, pain, or fever occur and apply ice for 15 min. out of every hour for the next 12-24 hours as tolerated. patient was advised to rest the joint(s) for 2 days.

## 2022-09-13 NOTE — DISCUSSION/SUMMARY
[de-identified] : Instructions:  Progress Note completed by Denise Clark PA-C\par * Dr. Diaz -- The documentation recorded accurately reflects the decisions made by me during this visit.

## 2022-09-13 NOTE — HISTORY OF PRESENT ILLNESS
[Constant] : constant [9] : 9 [de-identified] : 8/16/22: right shoulder and bilateral knee pain back again.\par 9/13/22:  temp improvement with csi bilateral knees.  right shoulder pain still.  [FreeTextEntry1] : right shoulder and both knees [de-identified] : cortisone injection

## 2022-09-13 NOTE — PHYSICAL EXAM
[4 ___] : forward flexion 4[unfilled]/5 [Right] : right knee [Left] : left knee [NL (0)] : extension 0 degrees [4___] : quadriceps 4[unfilled]/5 [5___] : hamstring 5[unfilled]/5 [TWNoteComboBox6] : internal rotation L5 [de-identified] : external rotation 25 degrees [] : no guarding during exam [TWNoteComboBox7] : flexion 120 degrees

## 2022-09-15 ENCOUNTER — FORM ENCOUNTER (OUTPATIENT)
Age: 72
End: 2022-09-15

## 2022-09-16 ENCOUNTER — APPOINTMENT (OUTPATIENT)
Dept: MRI IMAGING | Facility: CLINIC | Age: 72
End: 2022-09-16

## 2022-09-16 PROCEDURE — 73221 MRI JOINT UPR EXTREM W/O DYE: CPT | Mod: RT,MH

## 2022-09-20 ENCOUNTER — APPOINTMENT (OUTPATIENT)
Dept: FAMILY MEDICINE | Facility: CLINIC | Age: 72
End: 2022-09-20

## 2022-09-20 VITALS
DIASTOLIC BLOOD PRESSURE: 89 MMHG | HEART RATE: 72 BPM | WEIGHT: 222 LBS | OXYGEN SATURATION: 96 % | SYSTOLIC BLOOD PRESSURE: 150 MMHG | BODY MASS INDEX: 39.34 KG/M2 | HEIGHT: 63 IN

## 2022-09-20 DIAGNOSIS — Z09 ENCOUNTER FOR FOLLOW-UP EXAMINATION AFTER COMPLETED TREATMENT FOR CONDITIONS OTHER THAN MALIGNANT NEOPLASM: ICD-10-CM

## 2022-09-20 DIAGNOSIS — J45.909 UNSPECIFIED ASTHMA, UNCOMPLICATED: ICD-10-CM

## 2022-09-20 DIAGNOSIS — U07.1 COVID-19: ICD-10-CM

## 2022-09-20 DIAGNOSIS — Z01.818 ENCOUNTER FOR OTHER PREPROCEDURAL EXAMINATION: ICD-10-CM

## 2022-09-20 DIAGNOSIS — Z87.81 PERSONAL HISTORY OF (HEALED) TRAUMATIC FRACTURE: ICD-10-CM

## 2022-09-20 LAB
INR PPP: 1.9 RATIO
POCT-PROTHROMBIN TIME: 23.4 SECS
QUALITY CONTROL: YES

## 2022-09-20 PROCEDURE — 36415 COLL VENOUS BLD VENIPUNCTURE: CPT

## 2022-09-20 PROCEDURE — 85610 PROTHROMBIN TIME: CPT | Mod: QW

## 2022-09-20 PROCEDURE — 99213 OFFICE O/P EST LOW 20 MIN: CPT | Mod: 25

## 2022-09-20 RX ORDER — WARFARIN 2 MG/1
2 TABLET ORAL
Qty: 30 | Refills: 1 | Status: DISCONTINUED | COMMUNITY
Start: 2022-06-21 | End: 2022-09-20

## 2022-09-20 RX ORDER — WARFARIN 2 MG/1
2 TABLET ORAL DAILY
Qty: 30 | Refills: 0 | Status: DISCONTINUED | COMMUNITY
Start: 2022-08-02 | End: 2022-09-20

## 2022-09-20 NOTE — PHYSICAL EXAM
[No Acute Distress] : no acute distress [Normal Sclera/Conjunctiva] : normal sclera/conjunctiva [EOMI] : extraocular movements intact [Normal Outer Ear/Nose] : the outer ears and nose were normal in appearance [No JVD] : no jugular venous distention [No Respiratory Distress] : no respiratory distress  [No Accessory Muscle Use] : no accessory muscle use [Coordination Grossly Intact] : coordination grossly intact [Normal Affect] : the affect was normal [Alert and Oriented x3] : oriented to person, place, and time [Normal Insight/Judgement] : insight and judgment were intact

## 2022-09-21 ENCOUNTER — APPOINTMENT (OUTPATIENT)
Dept: FAMILY MEDICINE | Facility: CLINIC | Age: 72
End: 2022-09-21

## 2022-09-21 VITALS
SYSTOLIC BLOOD PRESSURE: 146 MMHG | HEART RATE: 62 BPM | DIASTOLIC BLOOD PRESSURE: 76 MMHG | HEIGHT: 63 IN | BODY MASS INDEX: 39.34 KG/M2 | WEIGHT: 222 LBS | OXYGEN SATURATION: 95 %

## 2022-09-21 DIAGNOSIS — T14.8XXA OTHER INJURY OF UNSPECIFIED BODY REGION, INITIAL ENCOUNTER: ICD-10-CM

## 2022-09-21 LAB
INR PPP: 2.2 RATIO
POCT-PROTHROMBIN TIME: 26 SECS
QUALITY CONTROL: YES

## 2022-09-21 PROCEDURE — 36415 COLL VENOUS BLD VENIPUNCTURE: CPT

## 2022-09-21 PROCEDURE — 99214 OFFICE O/P EST MOD 30 MIN: CPT | Mod: 25

## 2022-09-21 PROCEDURE — 85610 PROTHROMBIN TIME: CPT | Mod: QW

## 2022-09-21 NOTE — HISTORY OF PRESENT ILLNESS
[FreeTextEntry1] : c/o bruising to half of rt breast w/ lump since yesterday afternoon, admits dog jumps on top of her every so often\par took 4 mg warfarin last night

## 2022-09-21 NOTE — PHYSICAL EXAM
[No Acute Distress] : no acute distress [Normal Sclera/Conjunctiva] : normal sclera/conjunctiva [EOMI] : extraocular movements intact [Normal Outer Ear/Nose] : the outer ears and nose were normal in appearance [No JVD] : no jugular venous distention [No Respiratory Distress] : no respiratory distress  [No Accessory Muscle Use] : no accessory muscle use [Coordination Grossly Intact] : coordination grossly intact [Normal Affect] : the affect was normal [Alert and Oriented x3] : oriented to person, place, and time [Normal Insight/Judgement] : insight and judgment were intact [No Nipple Discharge] : no nipple discharge [No Axillary Lymphadenopathy] : no axillary lymphadenopathy [de-identified] : obese [de-identified] : ecchymosis to left half of rt breast, round firm tender mass noted to 9 o'clock [de-identified] : ecchymosis to left half of rt breast, round firm tender mass noted to 9 o'clock area

## 2022-09-22 ENCOUNTER — APPOINTMENT (OUTPATIENT)
Dept: ORTHOPEDIC SURGERY | Facility: CLINIC | Age: 72
End: 2022-09-22
Payer: MEDICARE

## 2022-09-22 VITALS — WEIGHT: 222 LBS | BODY MASS INDEX: 39.34 KG/M2 | HEIGHT: 63 IN

## 2022-09-22 DIAGNOSIS — S46.011D STRAIN OF MUSCLE(S) AND TENDON(S) OF THE ROTATOR CUFF OF RIGHT SHOULDER, SUBSEQUENT ENCOUNTER: ICD-10-CM

## 2022-09-22 PROCEDURE — 20611 DRAIN/INJ JOINT/BURSA W/US: CPT | Mod: 50

## 2022-09-22 PROCEDURE — 99214 OFFICE O/P EST MOD 30 MIN: CPT | Mod: 25

## 2022-09-22 PROCEDURE — 20610 DRAIN/INJ JOINT/BURSA W/O US: CPT | Mod: NC

## 2022-09-22 PROCEDURE — J3490N: CUSTOM

## 2022-09-22 NOTE — PHYSICAL EXAM
[4 ___] : forward flexion 4[unfilled]/5 [Right] : right knee [Left] : left knee [NL (0)] : extension 0 degrees [4___] : quadriceps 4[unfilled]/5 [5___] : hamstring 5[unfilled]/5 [TWNoteComboBox6] : internal rotation L5 [de-identified] : external rotation 25 degrees [] : no guarding during exam [TWNoteComboBox7] : flexion 120 degrees

## 2022-09-22 NOTE — PROCEDURE
[Right] : of the right [Shoulder] : shoulder [___ cc    3mg] :  Betamethasone (Celestone) ~Vcc of 3mg [___ cc    1%] : Lidocaine ~Vcc of 1%  [___ cc    0.25%] : Bupivacaine (Marcaine) ~Vcc of 0.25%  [Precise injection in area of tear] : precise injection in area of tear [Large Joint Injection] : Large joint injection [Bilateral] : bilaterally of the [Knee] : knee [Pain] : pain [Alcohol] : alcohol [Betadine] : betadine [Euflexxa] : Euflexxa [#2] : series #2 [] : Patient tolerated procedure well [Call if redness, pain or fever occur] : call if redness, pain or fever occur [Apply ice for 15min out of every hour for the next 12-24 hours as tolerated] : apply ice for 15 minutes out of every hour for the next 12-24 hours as tolerated [Patient was advised to rest the joint(s) for ____ days] : patient was advised to rest the joint(s) for [unfilled] days [Previous OTC use and PT nontherapeutic] : patient has tried OTC's including aspirin, Ibuprofen, Aleve, etc or prescription NSAIDS, and/or exercises at home and/or physical therapy without satisfactory response [Patient had decreased mobility in the joint] : patient had decreased mobility in the joint [Risks, benefits, alternatives discussed / Verbal consent obtained] : the risks benefits, and alternatives have been discussed, and verbal consent was obtained [Prior failure or difficult injection] : prior failure or difficult injection [All ultrasound images have been permanently captured and stored accordingly in our picture archiving and communication system] : All ultrasound images have been permanently captured and stored accordingly in our picture archiving and communication system [Visualization of the needle and placement of injection was performed without complication] : visualization of the needle and placement of injection was performed without complication [Inflammation] : inflammation [de-identified] : for accurate placement of needle into knee joint

## 2022-09-22 NOTE — ASSESSMENT
[FreeTextEntry1] : right knee pain with history of oa. pain worse.  csi 8/16/22 with temp relief. \par left knee pain. mri 10 2018 shows mmt and oa.   csi 8/16/22 with temp relief. \par right shoulder pain. mri shows small full thickness cuff tear.  discussed optoins with patient.  defers surgery now but aware of risks.\par left shoulder pain. history of left shoulder cuff repair on 2/26/2016 done by me.\par \par pmhx: depression, anxiety, copd, htn, now on blood thinners for PE.\par history of dvt LUE postop - resolved.\par \par patient gets tramadol from pcp for chronic pain  - discussed pain management consult with patient.

## 2022-09-22 NOTE — HISTORY OF PRESENT ILLNESS
[] : yes [9] : 9 [Constant] : constant [de-identified] : 8/16/22: right shoulder and bilateral knee pain back again.\par 9/13/22:  temp improvement with csi bilateral knees.  right shoulder pain still.  [FreeTextEntry1] : right shoulder and both knees [de-identified] : MRI [de-identified] : cortisone injection

## 2022-09-27 ENCOUNTER — RESULT CHARGE (OUTPATIENT)
Age: 72
End: 2022-09-27

## 2022-09-27 ENCOUNTER — APPOINTMENT (OUTPATIENT)
Dept: FAMILY MEDICINE | Facility: CLINIC | Age: 72
End: 2022-09-27

## 2022-09-27 VITALS
SYSTOLIC BLOOD PRESSURE: 130 MMHG | WEIGHT: 222 LBS | BODY MASS INDEX: 39.34 KG/M2 | DIASTOLIC BLOOD PRESSURE: 72 MMHG | HEART RATE: 61 BPM | OXYGEN SATURATION: 97 % | TEMPERATURE: 97.8 F | HEIGHT: 63 IN

## 2022-09-27 DIAGNOSIS — S46.911D STRAIN OF UNSPECIFIED MUSCLE, FASCIA AND TENDON AT SHOULDER AND UPPER ARM LEVEL, RIGHT ARM, SUBSEQUENT ENCOUNTER: ICD-10-CM

## 2022-09-27 DIAGNOSIS — R07.81 PLEURODYNIA: ICD-10-CM

## 2022-09-27 DIAGNOSIS — N63.10 UNSPECIFIED LUMP IN THE RIGHT BREAST, UNSPECIFIED QUADRANT: ICD-10-CM

## 2022-09-27 LAB
INR PPP: 2.6 RATIO
POCT-PROTHROMBIN TIME: 31.2 SECS

## 2022-09-27 PROCEDURE — 85610 PROTHROMBIN TIME: CPT | Mod: QW

## 2022-09-27 PROCEDURE — 99215 OFFICE O/P EST HI 40 MIN: CPT | Mod: 25

## 2022-09-27 NOTE — PHYSICAL EXAM
[No Acute Distress] : no acute distress [Normal Sclera/Conjunctiva] : normal sclera/conjunctiva [EOMI] : extraocular movements intact [Normal Outer Ear/Nose] : the outer ears and nose were normal in appearance [Normal] : normal rate, regular rhythm, normal S1 and S2 and no murmur heard

## 2022-09-27 NOTE — HISTORY OF PRESENT ILLNESS
[FreeTextEntry1] : here for inr chk, c/o left rib pain after chiropratic adjustment, hx of left rib fracture, still having bruising to rt breast w/ mass\par c/o high anxiety, sob when breathing, xanax seems to help, also dealing w/ son's return testicular cancer, has 1 yr old child\par used to speak to therapist\par c/o rt shoulder pain, would like more tramadol, knees are better

## 2022-09-27 NOTE — PLAN
[FreeTextEntry1] : pt going for breast sono tomorrow\par chk xray\par warm compresses\par cont coumadin\par refilled tramadol\par take xanax prn\par allowed to vent, emotional support provided, pt will f/u w/ different therapist

## 2022-09-29 ENCOUNTER — APPOINTMENT (OUTPATIENT)
Dept: ORTHOPEDIC SURGERY | Facility: CLINIC | Age: 72
End: 2022-09-29

## 2022-09-29 VITALS — HEIGHT: 63 IN | WEIGHT: 222 LBS | BODY MASS INDEX: 39.34 KG/M2

## 2022-09-29 PROCEDURE — 99212 OFFICE O/P EST SF 10 MIN: CPT | Mod: 25

## 2022-09-29 PROCEDURE — 20610 DRAIN/INJ JOINT/BURSA W/O US: CPT | Mod: 50

## 2022-09-29 NOTE — PROCEDURE
[FreeTextEntry3] : Viscosupplementation Injection: X-ray evidence of Osteoarthritis on this or prior visit and Patient has tried OTC's including aspirin, Ibuprofen, Aleve etc or prescription NSAIDS, and/or exercises at home and/ or physical therapy without satisfactory response.  The risks, benefits, and alternatives to Viscosupplementation injection were explained in full to the patient. Risks outlined include but are not limited to infection, sepsis, bleeding, scarring, skin discoloration, temporary increase in pain, syncopal episode, failure to resolve symptoms, allergic reaction, and symptom recurrence. Signs and symptoms of infection reviewed and patient advised to call immediately for redness, fevers, and/or chills. Patient understood the risks. All questions were answered. \par \par Oral informed consent was obtained.   Sterile technique was utilized for the procedure including the preparation of the solutions used for the injection. An injection of Euflexxa 2ml #3 was injected into BILATERAL KNEES.  Patient tolerated the procedure well. Advised to ice the injection site this evening.  Post Procedure Instructions: Patient was advised to call if redness, pain, or fever occur and apply ice for 15 min. out of every hour for the next 12-24 hours as tolerated. patient was advised to rest the joint(s) for 2 days.

## 2022-09-29 NOTE — ASSESSMENT
[FreeTextEntry1] : right knee pain with history of oa. pain worse.  csi 8/16/22 with temp relief. \par left knee pain. mri 10 2018 shows mmt and oa.   csi 8/16/22 with temp relief. \par right shoulder pain. mri shows small full thickness cuff tear.  discussed options with patient.  defers surgery now but aware of risks.  csi on 9/22/22 with temp relief. \par left shoulder pain. history of left shoulder cuff repair on 2/26/2016 done by me.\par \par pmhx: depression, anxiety, copd, htn, now on blood thinners for PE.\par history of dvt LUE postop - resolved.\par \par patient gets tramadol from pcp for chronic pain  - discussed pain management consult with patient.

## 2022-09-29 NOTE — HISTORY OF PRESENT ILLNESS
[3] : 3 [Euflexxa] : Euflexxa [de-identified] : 8/16/22: right shoulder and bilateral knee pain back again.\par 9/13/22:  temp improvement with csi bilateral knees.  right shoulder pain still. \par 9/29/22:  bilateral knee pain.  [] : no [FreeTextEntry1] : bilateral knees  [de-identified] : 09/22/22 [TWNoteComboBox1] : 40%

## 2022-09-29 NOTE — PHYSICAL EXAM
[4 ___] : forward flexion 4[unfilled]/5 [Right] : right knee [Left] : left knee [NL (0)] : extension 0 degrees [4___] : quadriceps 4[unfilled]/5 [5___] : hamstring 5[unfilled]/5 [TWNoteComboBox6] : internal rotation L5 [de-identified] : external rotation 25 degrees [] : no guarding during exam [TWNoteComboBox7] : flexion 120 degrees

## 2022-09-29 NOTE — DISCUSSION/SUMMARY
[de-identified] : Progress note completed by Denise Clark PA-C\par *Dr. Diaz - The MANUEL assigned on this date saw this patient independently.  I have reviewed the note and agree with the treatment provided.

## 2022-09-30 ENCOUNTER — APPOINTMENT (OUTPATIENT)
Dept: ORTHOPEDIC SURGERY | Facility: CLINIC | Age: 72
End: 2022-09-30

## 2022-09-30 DIAGNOSIS — M76.62 ACHILLES TENDINITIS, LEFT LEG: ICD-10-CM

## 2022-09-30 PROCEDURE — 99213 OFFICE O/P EST LOW 20 MIN: CPT

## 2022-09-30 NOTE — PHYSICAL EXAM
[Left] : left foot and ankle [Mild] : mild diffused ankle swelling [NL (40)] : plantar flexion 40 degrees [NL 30)] : inversion 30 degrees [NL (20)] : eversion 20 degrees [2+] : posterior tibialis pulse: 2+ [Normal] : saphenous nerve sensation normal [5___] : plantar flexion 5[unfilled]/5 [] : no pain when stressing lateral tarsal metatarsal joint [de-identified] : WB in short CAM boot.

## 2022-09-30 NOTE — HISTORY OF PRESENT ILLNESS
[5] : 5 [3] : 3 [Localized] : localized [Sharp] : sharp [Intermittent] : intermittent [Household chores] : household chores [Leisure] : leisure [Social interactions] : social interactions [Rest] : rest [Standing] : standing [Walking] : walking [Stairs] : stairs [de-identified] : Pt. presents for f/u L Achilles tendinitis from June 2022. She has been going to PT and reports continued improvement.  She has some intermittent soreness after prolonged walking.  Overall she is happy with her progress. [] : Post Surgical Visit: no [FreeTextEntry1] : L foot [FreeTextEntry7] : leg

## 2022-09-30 NOTE — ASSESSMENT
[FreeTextEntry1] : Patient has recovered well, and can slowly resume activities as tolerated.  Home stretching exercises were encouraged to maintain flexibility. Ice/nsaids can be used as needed. Patient will up as needed.\par

## 2022-10-06 ENCOUNTER — APPOINTMENT (OUTPATIENT)
Dept: ORTHOPEDIC SURGERY | Facility: CLINIC | Age: 72
End: 2022-10-06

## 2022-10-14 ENCOUNTER — APPOINTMENT (OUTPATIENT)
Dept: PULMONOLOGY | Facility: CLINIC | Age: 72
End: 2022-10-14

## 2022-10-14 PROCEDURE — 99442: CPT | Mod: 95

## 2022-10-21 ENCOUNTER — APPOINTMENT (OUTPATIENT)
Dept: FAMILY MEDICINE | Facility: CLINIC | Age: 72
End: 2022-10-21

## 2022-11-03 ENCOUNTER — APPOINTMENT (OUTPATIENT)
Dept: PULMONOLOGY | Facility: CLINIC | Age: 72
End: 2022-11-03

## 2022-11-03 VITALS
DIASTOLIC BLOOD PRESSURE: 80 MMHG | WEIGHT: 223 LBS | BODY MASS INDEX: 39.51 KG/M2 | HEIGHT: 63 IN | SYSTOLIC BLOOD PRESSURE: 133 MMHG | HEART RATE: 70 BPM | TEMPERATURE: 98.2 F | OXYGEN SATURATION: 93 %

## 2022-11-03 LAB
INR PPP: 4.4 RATIO
POCT-PROTHROMBIN TIME: 52.6 SECS

## 2022-11-03 PROCEDURE — G0009: CPT

## 2022-11-03 PROCEDURE — 99214 OFFICE O/P EST MOD 30 MIN: CPT | Mod: 25

## 2022-11-03 PROCEDURE — 36415 COLL VENOUS BLD VENIPUNCTURE: CPT

## 2022-11-03 PROCEDURE — 90677 PCV20 VACCINE IM: CPT

## 2022-11-03 PROCEDURE — 85610 PROTHROMBIN TIME: CPT | Mod: QW

## 2022-11-03 RX ORDER — AMMONIUM LACTATE 12 %
12 CREAM (GRAM) TOPICAL
Qty: 385 | Refills: 0 | Status: ACTIVE | COMMUNITY
Start: 2022-10-18

## 2022-11-03 NOTE — HISTORY OF PRESENT ILLNESS
[Former] : former [< 20 pack-years] : < 20 pack-years [TextBox_4] : 71-year-old female\par Oct 12 2022 telephone visit\par Chief complaint cough chest congestion\par Not start any wheezing\par She states home O2 saturations ranging from 92 to 95 96%\par She does have history of pulmonary embolus currently on active therapy with Coumadin\par She is not describing any pleuritic chest pain\par Noted that a review of her prior PT/INR over the past several weeks including September 27, 2022 she was therapeutic with an INR of 2.6\par Pression COPD rule out exacerbation flare\par Pulmonary embolism on active Coumadin (INR therapeutic\par Treatment Z-Munir\par Medrol Dosepak\par \par since last visit required in hot weather required LIAN with chest congestion with relief\par No fever or sputum\par NO pleuritic CP \par Sleep study -severe XENIA\par Pulmonary referral\par Additional information provided at today's visit including a more detailed chart review available with notes provided dating back to 2016 and\par Patient had an orthopedic surgical procedure which was complicated by a postoperative provoked pulmonary embolism\par This by definition and is the second provoked pulmonary embolism post COVID-19 infection\par Complicated pulmonary history\par nocturnal cough\par Chart reviewed\par History COPD \par Patient is status post [facility] May 7, 2022\par COVID positive\par CT scan of the chest demonstrated minimally of acute left lateral 5 through 7 ribs fracture within hematoma of the chest wall post fall on\par Also detected pulmonary embolism of the segmental arteries at the left lower lobe right lower lobe right upper lobe\par Ultrasound Doppler was negative for DVT\par Patient was initially sent home on Eliquis but it was too expensive and switched to Xarelto which patient states was giving her GI symptomatology with diarrhea and subsequently was switched to Coumadin\par Should be noted at today's visit the last INR on July 5, 2022 was 6.9 was advised to hold the Coumadin and then start 2 mg daily after 3 days with repeat evaluation\par Management per primary care physician\par Present is not complaining of any significant left-sided rib pain\par Cough additional complications include patient in a walking boot for Achilles tendinitis\par Also reports a prior history of pneumonia x2 remotely and bronchitis\par Does not describe any recent hospitalizations requiring steroids for COPD\par Has a subacute chronic cough\par Does not describe any wheezing chest congestion chest tightness at rest pleuritic chest pain at present hemoptysis\par No reported purulent sputum\par Patient states she was seen by cardiology reportedly had echocardiogram it was okay I did tell the cardiologist there was a component of exertional chest discomfort\par Patient received the COVID-vaccine Pfizer times 2+ a booster\par Reviewing her additional immunization profile she states flu shots are up-to-date\par She has previously received Prevnar 13 but not PCV 23\par Says has a very minimal tobacco history discontinued all tobacco dating back approximately 50 years\par No reported other history of chemical toxic inhalational exposures\par  [YearQuit] : 1972 [TextBox_19] : Reported positive history obstructive sleep apnea, insomnia

## 2022-11-03 NOTE — HISTORY OF PRESENT ILLNESS
[Former] : former [< 20 pack-years] : < 20 pack-years [TextBox_4] : 71-year-old female\par Oct 12 2022 telephone visit\par Chief complaint cough chest congestion\par Not start any wheezing\par She states home O2 saturations ranging from 92 to 95 96%\par She does have history of pulmonary embolus currently on active therapy with Coumadin\par She is not describing any pleuritic chest pain\par Noted that a review of her prior PT/INR over the past several weeks including September 27, 2022 she was therapeutic with an INR of 2.6\par Pression COPD rule out exacerbation flare\par Pulmonary embolism on active Coumadin (INR therapeutic\par Treatment Z-Munir\par Medrol Dosepak\par \par since last visit required in hot weather required LIAN with chest congestion with relief\par No fever or sputum\par NO pleuritic CP \par Sleep study -severe XENIA\par Pulmonary referral\par Additional information provided at today's visit including a more detailed chart review available with notes provided dating back to 2016 and\par Patient had an orthopedic surgical procedure which was complicated by a postoperative provoked pulmonary embolism\par This by definition and is the second provoked pulmonary embolism post COVID-19 infection\par Complicated pulmonary history\par nocturnal cough\par Chart reviewed\par History COPD \par Patient is status post [facility] May 7, 2022\par COVID positive\par CT scan of the chest demonstrated minimally of acute left lateral 5 through 7 ribs fracture within hematoma of the chest wall post fall on\par Also detected pulmonary embolism of the segmental arteries at the left lower lobe right lower lobe right upper lobe\par Ultrasound Doppler was negative for DVT\par Patient was initially sent home on Eliquis but it was too expensive and switched to Xarelto which patient states was giving her GI symptomatology with diarrhea and subsequently was switched to Coumadin\par Should be noted at today's visit the last INR on July 5, 2022 was 6.9 was advised to hold the Coumadin and then start 2 mg daily after 3 days with repeat evaluation\par Management per primary care physician\par Present is not complaining of any significant left-sided rib pain\par Cough additional complications include patient in a walking boot for Achilles tendinitis\par Also reports a prior history of pneumonia x2 remotely and bronchitis\par Does not describe any recent hospitalizations requiring steroids for COPD\par Has a subacute chronic cough\par Does not describe any wheezing chest congestion chest tightness at rest pleuritic chest pain at present hemoptysis\par No reported purulent sputum\par Patient states she was seen by cardiology reportedly had echocardiogram it was okay I did tell the cardiologist there was a component of exertional chest discomfort\par Patient received the COVID-vaccine Pfizer times 2+ a booster\par Reviewing her additional immunization profile she states flu shots are up-to-date\par She has previously received Prevnar 13 but not PCV 23\par Says has a very minimal tobacco history discontinued all tobacco dating back approximately 50 years\par No reported other history of chemical toxic inhalational exposures\par  [YearQuit] : 1972 [TextBox_19] : Reported positive history obstructive sleep apnea, insomnia

## 2022-11-03 NOTE — CONSULT LETTER
[Dear  ___] : Dear  [unfilled], [Please see my note below.] : Please see my note below. [Consult Closing:] : Thank you very much for allowing me to participate in the care of this patient.  If you have any questions, please do not hesitate to contact me. [Sincerely,] : Sincerely, [FreeTextEntry3] : Cholo Browning D.O., CODI\par  of Medicine\par [facility] [employer]\par

## 2022-11-03 NOTE — DISCUSSION/SUMMARY
[FreeTextEntry1] : COPD pulmonary physiology relatively stable \par Obesity\par Pulmonary embolism\par Provoked pulmonary embolism dating back to early May 2022 with COVID-19 infection\par Gating issue based on additional historical data and chart review this is the second provoked pulmonary embolism with a prior dating back to 2016\par Status post chest wall trauma with left rib fractures\par Component of obesity and physical deconditioning contributing to exertional dyspnea\par Recommendations\par Issue based on 2 pulmonary embolisms whether patient should receive long-term anticoagulation as opposed to just a 6-month protocol\par Discussed in detail with patient on\par If long-term anticoagulation was indicated with prefer to use low-dose Xarelto 10 mg as opposed to Coumadin with its difficulties in maintaining therapeutic INR and this was discussed with patient\par In addition we will hold off but eventually patient will get an extensive hypercoagulable work-up in the office\par At 6 months we will also repeat a CT angiogram protocol to confirm clot clearance and a lower extremity DVT and then make final decision with input from primary care physician\par Baseline D-dimer nl\par As noted Coumadin adjustments per PMD\par I did explain that ideally oral anticoagulation with medication such as Eliquis or Xarelto would be easier without management of Coumadin dosing in therapeutic range but she is convinced that she had significant GI side effects with Xarelto \par At 6 months can repeat a CT angiogram protocol and then if evidence of discontinuation for Coumadin can then follow-up with hypercoagulable work-up for completeness\par At present completed 6/6 months of anticoagulation- get CT CHEST PA  and if neg d/c coumadin\par \par Patient is requesting accommodations at work because of 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  My office follow-up 1 month\par  Order RESMED CPAP \par Setting CPAP 8.0 cm H20 Nasal Pillow\par F/u Overnight Oximetry\par Increase Coumadin  3  mg QD until next INR\par \par NOTED Hold Advair and Change Trelegy 200 mcg 1 puff QD

## 2022-11-03 NOTE — PROCEDURE
[FreeTextEntry1] : POCT  11/3 22\par PT INR 4.4- note change coumadin to  3 mg\par \par Sleep study\par August 15 August 72,022\par Severe obstructive sleep apnea\par AHI 33\par Positional component\par Insufficient nonsupine study time\par Present time less than 90% saturation on room air 13.4%\par Greater than 30 dB 63%\par Sigifredo desaturation 77.8%\par Impression severe obstructive sleep apnea\par Address treatment protocol focus primarily on CPAP\par On treatment with CPAP we will then require overnight oximetry to use to evaluate for the noted severe oxygen desaturation also can address an attended titration study in view of the severe oxygen desaturation as an alternative\par \par Pulmonary 6 minute exercise study  8/4/2\par ambulatedwith walker\par  RA O2 sat 96 % \par No RA desaturation\par \par PFT July 6, 2022\par Mild reduction in flow rates\par FVC 70% predicted\par TLC normal range 82% predicted\par RV/TLC ratio 141% predicted\par Diffusion 79% predicted\par Hemoglobin 13.7\par Impression air trapping\par Sawtooth pattern clinical correlation consistent with obstructive sleep apnea rule out noted that this patient has prior data dating back to February 4, 2007 there is some noted decline at the flow rates with stable diffusion and lung volumes\par \par Chest x-ray PA lateral 7/6/22\par Cardiac size normal\par Lung fields clear\par No parenchymal infiltrates pleural effusions or dominant pulmonary nodules\par No pneumothorax\par Pulmonary artery size grossly normal based on plain film

## 2022-11-03 NOTE — CONSULT LETTER
[Dear  ___] : Dear  [unfilled], [Please see my note below.] : Please see my note below. [Consult Closing:] : Thank you very much for allowing me to participate in the care of this patient.  If you have any questions, please do not hesitate to contact me. [Sincerely,] : Sincerely, [FreeTextEntry3] : Cholo Browning D.O., CODI\par  of Medicine\par [facility] [employer]\par

## 2022-11-03 NOTE — PHYSICAL EXAM
[No Acute Distress] : no acute distress [Normal Oropharynx] : normal oropharynx [II] : Mallampati Class: II [Normal Appearance] : normal appearance [Supple] : supple [No JVD] : no jvd [Normal Rate/Rhythm] : normal rate/rhythm [Normal S1, S2] : normal s1, s2 [No Murmurs] : no murmurs [No Resp Distress] : no resp distress [No Acc Muscle Use] : no acc muscle use [Normal Palpation] : normal palpation [Normal Rhythm and Effort] : normal rhythm and effort [Clear to Auscultation Bilaterally] : clear to auscultation bilaterally [Normal to Percussion] : normal to percussion [Benign] : benign [Not Tender] : not tender [Soft] : soft [No HSM] : no hsm [Normal Bowel Sounds] : normal bowel sounds [No Clubbing] : no clubbing [No Cyanosis] : no cyanosis [No Edema] : no edema [Normal Color/ Pigmentation] : normal color/ pigmentation [No Focal Deficits] : no focal deficits [Oriented x3] : oriented x3 [Normal Affect] : normal affect Yes [TextBox_2] : Overweight [TextBox_99] : Ambulating with walker in a boot

## 2022-11-04 ENCOUNTER — NON-APPOINTMENT (OUTPATIENT)
Age: 72
End: 2022-11-04

## 2022-11-04 LAB
ANION GAP SERPL CALC-SCNC: 14 MMOL/L
BASOPHILS # BLD AUTO: 0.02 K/UL
BASOPHILS NFR BLD AUTO: 0.4 %
BUN SERPL-MCNC: 15 MG/DL
CALCIUM SERPL-MCNC: 9.3 MG/DL
CHLORIDE SERPL-SCNC: 105 MMOL/L
CO2 SERPL-SCNC: 23 MMOL/L
CREAT SERPL-MCNC: 0.87 MG/DL
DEPRECATED D DIMER PPP IA-ACNC: <150 NG/ML DDU
EGFR: 71 ML/MIN/1.73M2
EOSINOPHIL # BLD AUTO: 0.12 K/UL
EOSINOPHIL NFR BLD AUTO: 2.3 %
GLUCOSE SERPL-MCNC: 84 MG/DL
HCT VFR BLD CALC: 39.7 %
HGB BLD-MCNC: 13.1 G/DL
IMM GRANULOCYTES NFR BLD AUTO: 0.2 %
LYMPHOCYTES # BLD AUTO: 0.82 K/UL
LYMPHOCYTES NFR BLD AUTO: 15.5 %
MAN DIFF?: NORMAL
MCHC RBC-ENTMCNC: 28.8 PG
MCHC RBC-ENTMCNC: 33 GM/DL
MCV RBC AUTO: 87.3 FL
MONOCYTES # BLD AUTO: 0.37 K/UL
MONOCYTES NFR BLD AUTO: 7 %
NEUTROPHILS # BLD AUTO: 3.96 K/UL
NEUTROPHILS NFR BLD AUTO: 74.6 %
PLATELET # BLD AUTO: 250 K/UL
POTASSIUM SERPL-SCNC: 4 MMOL/L
RBC # BLD: 4.55 M/UL
RBC # FLD: 13.1 %
SODIUM SERPL-SCNC: 142 MMOL/L
WBC # FLD AUTO: 5.3 K/UL

## 2022-11-10 ENCOUNTER — APPOINTMENT (OUTPATIENT)
Dept: ORTHOPEDIC SURGERY | Facility: CLINIC | Age: 72
End: 2022-11-10

## 2022-11-15 ENCOUNTER — APPOINTMENT (OUTPATIENT)
Dept: FAMILY MEDICINE | Facility: CLINIC | Age: 72
End: 2022-11-15

## 2022-11-15 VITALS
OXYGEN SATURATION: 96 % | WEIGHT: 223 LBS | DIASTOLIC BLOOD PRESSURE: 80 MMHG | HEIGHT: 63 IN | BODY MASS INDEX: 39.51 KG/M2 | HEART RATE: 74 BPM | SYSTOLIC BLOOD PRESSURE: 122 MMHG

## 2022-11-15 PROCEDURE — 99214 OFFICE O/P EST MOD 30 MIN: CPT

## 2022-12-08 ENCOUNTER — APPOINTMENT (OUTPATIENT)
Dept: PULMONOLOGY | Facility: CLINIC | Age: 72
End: 2022-12-08

## 2022-12-08 VITALS
SYSTOLIC BLOOD PRESSURE: 133 MMHG | OXYGEN SATURATION: 96 % | BODY MASS INDEX: 39.75 KG/M2 | WEIGHT: 216 LBS | DIASTOLIC BLOOD PRESSURE: 74 MMHG | HEIGHT: 62 IN | HEART RATE: 63 BPM

## 2022-12-08 PROCEDURE — 94060 EVALUATION OF WHEEZING: CPT

## 2022-12-08 PROCEDURE — 99214 OFFICE O/P EST MOD 30 MIN: CPT | Mod: 25

## 2022-12-08 NOTE — HISTORY OF PRESENT ILLNESS
[Former] : former [< 20 pack-years] : < 20 pack-years [TextBox_4] : 71-year-old female\par Remains on TRELEGY with no decline resp sxs\par Oct 12 2022 telephone visit\par Chief complaint cough chest congestion\par Not start any wheezing\par She states home O2 saturations ranging from 92 to 95 96%\par She does have history of pulmonary embolus currently on active therapy with Coumadin\par She is not describing any pleuritic chest pain\par Noted that a review of her prior PT/INR over the past several weeks including September 27, 2022 she was therapeutic with an INR of 2.6\par Pression COPD rule out exacerbation flare\par Pulmonary embolism on active Coumadin (INR therapeutic\par Treatment Z-Munir\par Medrol Dosepak\par \par since last visit required in hot weather required LIAN with chest congestion with relief\par No fever or sputum\par NO pleuritic CP \par Sleep study -severe XENIA\par Pulmonary referral\par Additional information provided at today's visit including a more detailed chart review available with notes provided dating back to 2016 and\par Patient had an orthopedic surgical procedure which was complicated by a postoperative provoked pulmonary embolism\par This by definition and is the second provoked pulmonary embolism post COVID-19 infection\par Complicated pulmonary history\par nocturnal cough\par Chart reviewed\par History COPD \par Patient is status post [facility] May 7, 2022\par COVID positive\par CT scan of the chest demonstrated minimally of acute left lateral 5 through 7 ribs fracture within hematoma of the chest wall post fall on\par Also detected pulmonary embolism of the segmental arteries at the left lower lobe right lower lobe right upper lobe\par Ultrasound Doppler was negative for DVT\par Patient was initially sent home on Eliquis but it was too expensive and switched to Xarelto which patient states was giving her GI symptomatology with diarrhea and subsequently was switched to Coumadin\par Should be noted at today's visit the last INR on July 5, 2022 was 6.9 was advised to hold the Coumadin and then start 2 mg daily after 3 days with repeat evaluation\par Management per primary care physician\par Present is not complaining of any significant left-sided rib pain\par Cough additional complications include patient in a walking boot for Achilles tendinitis\par Also reports a prior history of pneumonia x2 remotely and bronchitis\par Does not describe any recent hospitalizations requiring steroids for COPD\par Has a subacute chronic cough\par Does not describe any wheezing chest congestion chest tightness at rest pleuritic chest pain at present hemoptysis\par No reported purulent sputum\par Patient states she was seen by cardiology reportedly had echocardiogram it was okay I did tell the cardiologist there was a component of exertional chest discomfort\par Patient received the COVID-vaccine Pfizer times 2+ a booster\par Reviewing her additional immunization profile she states flu shots are up-to-date\par She has previously received Prevnar 13 but not PCV 23\par Says has a very minimal tobacco history discontinued all tobacco dating back approximately 50 years\par No reported other history of chemical toxic inhalational exposures\par  [YearQuit] : 1972 [TextBox_19] : Reported positive history obstructive sleep apnea, insomnia

## 2022-12-08 NOTE — CONSULT LETTER
[Please see my note below.] : Please see my note below. [Consult Closing:] : Thank you very much for allowing me to participate in the care of this patient.  If you have any questions, please do not hesitate to contact me. [Sincerely,] : Sincerely, [FreeTextEntry3] : Cholo Browning D.O., CODI\par  of Medicine\par [facility] [employer]\par

## 2022-12-08 NOTE — DISCUSSION/SUMMARY
[FreeTextEntry1] : COPD pulmonary physiology relatively stable \par Obesity\par Pulmonary embolism\par Provoked pulmonary embolism dating back to early May 2022 with COVID-19 infection\par Gating issue based on additional historical data and chart review this is the second provoked pulmonary embolism with a prior dating back to 2016 BUT off AC\par Status post chest wall trauma with left rib fractures\par Component of obesity and physical deconditioning contributing to exertional dyspnea\par Recommendations\par Issue based on 2 pulmonary embolisms whether patient should receive long-term anticoagulation as opposed to just a 6-month protocol\par Discussed in detail with patient on\par If long-term anticoagulation was indicated with prefer to use low-dose Xarelto 10 mg as opposed to Coumadin with its difficulties in maintaining therapeutic INR and this was discussed with patient\par In addition we will hold off but eventually patient will get an extensive hypercoagulable work-up in the office\par At 6 months we will also repeat a CT angiogram protocol to confirm clot clearance and a lower extremity DVT and then make final decision with input from primary care physician\par Baseline D-dimer nl\par As noted Coumadin adjustments per PMD\par I did explain that ideally oral anticoagulation with medication such as Eliquis or Xarelto would be easier without management of Coumadin dosing in therapeutic range but she is convinced that she had significant GI side effects with Xarelto \par At 6 months can repeat a CT angiogram protocol and then if evidence of discontinuation for Coumadin can then follow-up with hypercoagulable work-up for completeness\par At present completed 6/6 months of anticoagulation- get CT CHEST PA  and if neg d/c coumadin\par \par Patient is requesting accommodations at work because of 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  My office follow-up 1 month\par  Order RESMED CPAP \par Setting CPAP 8.0 cm H20 Nasal Pillow\par F/u Overnight Oximetry\par  Coumadin OFF\par \par NOTED Hold Advair and Change Trelegy 200 mcg 1 puff QD\par strong recommended COVID Bi Valent booster

## 2022-12-08 NOTE — PROCEDURE
[FreeTextEntry1] : Armando 12/8/22\par Mild dec FVC\par stable\par \par CT chest November 7, 2022\par CT angiogram\par Indication prior pulmonary embolism\par Comparison study of July 30, 2020 Urecholine therapy report no filling infection\par Negative pulmonary embolism\par no dissection\par Pulmonary arteries normal caliber\par 3 mm subpleural pulmonary nodule right upper lobe\par No evidence of a CT angiogram findings of a pulmonary embolism with resolution and no residual we will discontinue Coumadin treatment protocol\par \par Sleep study\par August 15 August 72,022\par Severe obstructive sleep apnea\par AHI 33\par Positional component\par Insufficient nonsupine study time\par Present time less than 90% saturation on room air 13.4%\par Greater than 30 dB 63%\par Sigifredo desaturation 77.8%\par Impression severe obstructive sleep apnea\par Address treatment protocol focus primarily on CPAP\par On treatment with CPAP we will then require overnight oximetry to use to evaluate for the noted severe oxygen desaturation also can address an attended titration study in view of the severe oxygen desaturation as an alternative\par \par Pulmonary 6 minute exercise study  8/4/2\par ambulated with walker\par  RA O2 sat 96 % \par No RA desaturation\par \par PFT July 6, 2022\par Mild reduction in flow rates\par FVC 70% predicted\par TLC normal range 82% predicted\par RV/TLC ratio 141% predicted\par Diffusion 79% predicted\par Hemoglobin 13.7\par Impression air trapping\par Sawtooth pattern clinical correlation consistent with obstructive sleep apnea rule out noted that this patient has prior data dating back to February 4, 2007 there is some noted decline at the flow rates with stable diffusion and lung volumes\par \par Chest x-ray PA lateral 7/6/22\par Cardiac size normal\par Lung fields clear\par No parenchymal infiltrates pleural effusions or dominant pulmonary nodules\par No pneumothorax\par Pulmonary artery size grossly normal based on plain film

## 2023-01-24 ENCOUNTER — APPOINTMENT (OUTPATIENT)
Dept: ORTHOPEDIC SURGERY | Facility: CLINIC | Age: 73
End: 2023-01-24
Payer: MEDICARE

## 2023-01-24 VITALS — HEIGHT: 62 IN | WEIGHT: 216 LBS | BODY MASS INDEX: 39.75 KG/M2

## 2023-01-24 PROCEDURE — 99214 OFFICE O/P EST MOD 30 MIN: CPT | Mod: 25

## 2023-01-24 PROCEDURE — 20610 DRAIN/INJ JOINT/BURSA W/O US: CPT | Mod: RT

## 2023-01-24 PROCEDURE — J3490N: CUSTOM | Mod: NC

## 2023-01-24 NOTE — DISCUSSION/SUMMARY
[de-identified] : Progress note completed by Denise Clark PA-C\par *Dr. Diaz - The MANUEL assigned on this date saw this patient independently.  I have reviewed the note and agree with the treatment provided.

## 2023-01-24 NOTE — PHYSICAL EXAM
[4 ___] : forward flexion 4[unfilled]/5 [Right] : right knee [Left] : left knee [NL (0)] : extension 0 degrees [4___] : quadriceps 4[unfilled]/5 [5___] : hamstring 5[unfilled]/5 [TWNoteComboBox6] : internal rotation L5 [de-identified] : external rotation 25 degrees [] : no guarding during exam [TWNoteComboBox7] : flexion 120 degrees

## 2023-01-24 NOTE — PROCEDURE
[FreeTextEntry3] : Procedure Name: Large Joint Injection / Aspiration: Celestone, Lidocaine and Marcaine\par \par Large Joint Injection was performed because of pain and inflammation. Anesthesia: ethyl chloride sprayed topically.. \par Celestone: An injection of Celestone 6 mg , 1 cc. \par Lidocaine 1%: 3 cc.\par Marcaine 0.25%:  3 cc.\par \par Patient has tried OTC's including aspirin, Ibuprofen, Aleve etc or prescription NSAIDS, and/or exercises at home and/ or physical therapy without satisfactory response and Patient has decreased mobility in the joint. The risks, benefits, and alternatives to cortisone injection were explained in full to the patient. Risks outlined include but are not limited to infection, sepsis, bleeding, scarring, skin discoloration, temporary increase in pain, syncopal episode, failure to resolve symptoms, allergic reaction, symptom recurrence, and elevation of blood sugar in diabetics. Patient understood the risks. All questions were answered.   Oral informed consent was obtained.   Sterile technique was utilized for the procedure including the preparation of the solutions used for the injection. Medication was injected into the RIGHT SUBACROMIAL SPACE.   Patient tolerated the procedure well. Advised to ice the injection site this evening.  Post Procedure Instructions: Patient was advised to call if redness, pain, or fever occur and apply ice for 15 min. out of every hour for the next 12-24 hours as tolerated. patient was advised to rest the joint(s) for 2 days.

## 2023-01-24 NOTE — HISTORY OF PRESENT ILLNESS
[9] : 9 [5] : 5 [Stabbing] : stabbing [Tightness] : tightness [de-identified] : 8/16/22: right shoulder and bilateral knee pain back again.\par 9/13/22:  temp improvement with csi bilateral knees.  right shoulder pain still. \par 9/29/22:  bilateral knee pain. \par 1/24/23:  right shoulder pain.  [FreeTextEntry1] : rt shoulder, bilat knees  [FreeTextEntry6] : locking

## 2023-02-09 ENCOUNTER — NON-APPOINTMENT (OUTPATIENT)
Age: 73
End: 2023-02-09

## 2023-02-09 ENCOUNTER — APPOINTMENT (OUTPATIENT)
Dept: PULMONOLOGY | Facility: CLINIC | Age: 73
End: 2023-02-09
Payer: MEDICARE

## 2023-02-09 VITALS — SYSTOLIC BLOOD PRESSURE: 156 MMHG | DIASTOLIC BLOOD PRESSURE: 79 MMHG | HEART RATE: 60 BPM | OXYGEN SATURATION: 97 %

## 2023-02-09 PROCEDURE — 94010 BREATHING CAPACITY TEST: CPT

## 2023-02-09 PROCEDURE — 94729 DIFFUSING CAPACITY: CPT

## 2023-02-09 PROCEDURE — ZZZZZ: CPT

## 2023-02-09 PROCEDURE — 94727 GAS DIL/WSHOT DETER LNG VOL: CPT

## 2023-02-09 PROCEDURE — 36415 COLL VENOUS BLD VENIPUNCTURE: CPT

## 2023-02-09 PROCEDURE — 99215 OFFICE O/P EST HI 40 MIN: CPT | Mod: 25

## 2023-02-09 NOTE — CONSULT LETTER
[Please see my note below.] : Please see my note below. [Consult Closing:] : Thank you very much for allowing me to participate in the care of this patient.  If you have any questions, please do not hesitate to contact me. [Sincerely,] : Sincerely, [FreeTextEntry3] : Cholo Browning D.O., CODI\par  of Medicine\par [facility] [employer]\par

## 2023-02-09 NOTE — PROCEDURE
[FreeTextEntry1] : PFT feb 9 2023\par  Flow rates taylor nl\par  TLC 74 %\par  DLCO  57 % with mod  loss fx  alveolar capillary units HGB 13.1\par HGB 13.1\par \par CPAP data compliance\par Data unavailable to January 1, 2023\par Affect 100 symptoms 96.7%\par AHI 7.4\par Encouraged increased usage\par \par Taylor 12/8/22\par Mild dec FVC\par stable\par \par CT chest November 7, 2022\par CT angiogram\par Indication prior pulmonary embolism\par Comparison study of July 30, 2020  report no filling infection\par Negative pulmonary embolism\par no dissection\par Pulmonary arteries normal caliber\par 3 mm subpleural pulmonary nodule right upper lobe\par No evidence of a CT angiogram findings of a pulmonary embolism with resolution and no residual we will discontinue Coumadin treatment protocol\par \par Sleep study\par August 15 August 72,022\par Severe obstructive sleep apnea\par AHI 33\par Positional component\par Insufficient nonsupine study time\par Present time less than 90% saturation on room air 13.4%\par Greater than 30 dB 63%\par Sigifredo desaturation 77.8%\par Impression severe obstructive sleep apnea\par Address treatment protocol focus primarily on CPAP\par On treatment with CPAP we will then require overnight oximetry to use to evaluate for the noted severe oxygen desaturation also can address an attended titration study in view of the severe oxygen desaturation as an alternative\par \par Pulmonary 6 minute exercise study  8/4/2\par ambulated with walker\par  RA O2 sat 96 % \par No RA desaturation\par \par PFT July 6, 2022\par Mild reduction in flow rates\par FVC 70% predicted\par TLC normal range 82% predicted\par RV/TLC ratio 141% predicted\par Diffusion 79% predicted\par Hemoglobin 13.7\par Impression air trapping\par Sawtooth pattern clinical correlation consistent with obstructive sleep apnea rule out noted that this patient has prior data dating back to February 4, 2007 there is some noted decline at the flow rates with stable diffusion and lung volumes\par \par Chest x-ray PA lateral 7/6/22\par Cardiac size normal\par Lung fields clear\par No parenchymal infiltrates pleural effusions or dominant pulmonary nodules\par No pneumothorax\par Pulmonary artery size grossly normal based on plain film

## 2023-02-09 NOTE — HISTORY OF PRESENT ILLNESS
[TextBox_4] : 71-year-old female\par Remains on TRELEGY with no decline resp sxs\par exertional chest tightness\par Chief complaint cough chest congestion\par Not start any wheezing\par She states home O2 saturations ranging from 92 to 95 96%\par She does have history of pulmonary embolus currently on active therapy with Coumadin\par She is not describing any pleuritic chest pain\par Noted that a review of her prior PT/INR over the past several weeks including September 27, 2022 she was therapeutic with an INR of 2.6\par Pression COPD rule out exacerbation flare\par Pulmonary embolism on active Coumadin (INR therapeutic\par Treatment Z-Munir\par Medrol Dosepak\par \par since last visit required in hot weather required LIAN with chest congestion with relief\par No fever or sputum\par NO pleuritic CP \par Sleep study -severe XENIA\par Pulmonary referral\par Additional information provided at today's visit including a more detailed chart review available with notes provided dating back to 2016 and\par Patient had an orthopedic surgical procedure which was complicated by a postoperative provoked pulmonary embolism\par This by definition and is the second provoked pulmonary embolism post COVID-19 infection\par Complicated pulmonary history\par nocturnal cough\par Chart reviewed\par History COPD \par Patient is status post [facility] May 7, 2022\par COVID positive\par CT scan of the chest demonstrated minimally of acute left lateral 5 through 7 ribs fracture within hematoma of the chest wall post fall on\par Also detected pulmonary embolism of the segmental arteries at the left lower lobe right lower lobe right upper lobe\par Ultrasound Doppler was negative for DVT\par Patient was initially sent home on Eliquis but it was too expensive and switched to Xarelto which patient states was giving her GI symptomatology with diarrhea and subsequently was switched to Coumadin\par Should be noted at today's visit the last INR on July 5, 2022 was 6.9 was advised to hold the Coumadin and then start 2 mg daily after 3 days with repeat evaluation\par Management per primary care physician\par Present is not complaining of any significant left-sided rib pain\par Cough additional complications include patient in a walking boot for Achilles tendinitis\par Also reports a prior history of pneumonia x2 remotely and bronchitis\par Does not describe any recent hospitalizations requiring steroids for COPD\par Has a subacute chronic cough\par Does not describe any wheezing chest congestion chest tightness at rest pleuritic chest pain at present hemoptysis\par No reported purulent sputum\par Patient states she was seen by cardiology reportedly had echocardiogram it was okay I did tell the cardiologist there was a component of exertional chest discomfort\par Patient received the COVID-vaccine Pfizer times 2+ a booster\par Reviewing her additional immunization profile she states flu shots are up-to-date\par She has previously received Prevnar 13 but not PCV 23\par Says has a very minimal tobacco history discontinued all tobacco dating back approximately 50 years\par No reported other history of chemical toxic inhalational exposures\par  [TextBox_19] : Reported positive history obstructive sleep apnea, insomnia

## 2023-02-09 NOTE — DISCUSSION/SUMMARY
[FreeTextEntry1] : COPD pulmonary physiology  with decline at DLCO and lung volumes \par Pos exertional dyspnea  r/o recurrent PE as etiology vs cardiac ischemic disease\par Obesity\par Pulmonary embolism Off AC\par Provoked pulmonary embolism dating back to early May 2022 with COVID-19 infection\par Gating issue based on additional historical data and chart review this is the second provoked pulmonary embolism with a prior dating back to 2016 BUT off AC\par Status post chest wall trauma with left rib fractures\par Component of obesity and physical deconditioning contributing to exertional dyspnea\par XENIA on CPAP\par Recommendations\par Issue based on 2 pulmonary embolisms whether patient should receive long-term anticoagulation as opposed to just a 6-month protocol\par Discussed in detail with patient on\par If long-term anticoagulation was indicated with prefer to use low-dose Xarelto 10 mg as opposed to Coumadin with its difficulties in maintaining therapeutic INR and this was discussed with patient\par In addition we will hold off but eventually patient will get an extensive hypercoagulable work-up in the office\par At 6 months we will also repeat a CT angiogram protocol to confirm clot clearance and a lower extremity DVT and then make final decision with input from primary care physician\par Baseline D-dimer nl\par As noted Coumadin adjustments per PMD\par I did explain that ideally oral anticoagulation with medication such as Eliquis or Xarelto would be easier without management of Coumadin dosing in therapeutic range but she is convinced that she had significant GI side effects with Xarelto \par At 6 months can repeat a CT angiogram protocol and then if evidence of discontinuation for Coumadin can then follow-up with hypercoagulable work-up for completeness\par At present completed 6/6 months of anticoagulation- get CT CHEST PA  and if neg d/c coumadin COMPLETED\par recheck D DIMER CBC Renal fx\par \par Patient is requesting accommodations at work because of 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  My office follow-up 1 month\par  Order RESMED CPAP \par Setting CPAP 8.0 cm H20 Nasal Pillow\par F/u Overnight Oximetry\par  Coumadin OFF\par \par NOTED Hold Advair and Change Trelegy 200 mcg 1 puff QD\par strong recommended COVID Bi Valent booster

## 2023-02-10 ENCOUNTER — NON-APPOINTMENT (OUTPATIENT)
Age: 73
End: 2023-02-10

## 2023-02-10 ENCOUNTER — APPOINTMENT (OUTPATIENT)
Dept: FAMILY MEDICINE | Facility: CLINIC | Age: 73
End: 2023-02-10
Payer: MEDICARE

## 2023-02-10 VITALS
SYSTOLIC BLOOD PRESSURE: 130 MMHG | OXYGEN SATURATION: 98 % | BODY MASS INDEX: 39.56 KG/M2 | WEIGHT: 215 LBS | DIASTOLIC BLOOD PRESSURE: 74 MMHG | HEART RATE: 70 BPM | HEIGHT: 62 IN

## 2023-02-10 DIAGNOSIS — G47.00 INSOMNIA, UNSPECIFIED: ICD-10-CM

## 2023-02-10 DIAGNOSIS — M25.511 PAIN IN RIGHT SHOULDER: ICD-10-CM

## 2023-02-10 LAB
ANION GAP SERPL CALC-SCNC: 14 MMOL/L
BASOPHILS # BLD AUTO: 0.03 K/UL
BASOPHILS NFR BLD AUTO: 0.6 %
BUN SERPL-MCNC: 15 MG/DL
CALCIUM SERPL-MCNC: 9.5 MG/DL
CHLORIDE SERPL-SCNC: 107 MMOL/L
CO2 SERPL-SCNC: 22 MMOL/L
CREAT SERPL-MCNC: 0.88 MG/DL
DEPRECATED D DIMER PPP IA-ACNC: 318 NG/ML DDU
EGFR: 69 ML/MIN/1.73M2
EOSINOPHIL # BLD AUTO: 0.14 K/UL
EOSINOPHIL NFR BLD AUTO: 2.6 %
GLUCOSE SERPL-MCNC: 79 MG/DL
HCT VFR BLD CALC: 40 %
HGB BLD-MCNC: 12.7 G/DL
IMM GRANULOCYTES NFR BLD AUTO: 0.2 %
LYMPHOCYTES # BLD AUTO: 0.54 K/UL
LYMPHOCYTES NFR BLD AUTO: 10 %
MAN DIFF?: NORMAL
MCHC RBC-ENTMCNC: 29.6 PG
MCHC RBC-ENTMCNC: 31.8 GM/DL
MCV RBC AUTO: 93.2 FL
MONOCYTES # BLD AUTO: 0.3 K/UL
MONOCYTES NFR BLD AUTO: 5.5 %
NEUTROPHILS # BLD AUTO: 4.39 K/UL
NEUTROPHILS NFR BLD AUTO: 81.1 %
PLATELET # BLD AUTO: 249 K/UL
POTASSIUM SERPL-SCNC: 4.2 MMOL/L
RBC # BLD: 4.29 M/UL
RBC # FLD: 13.3 %
SODIUM SERPL-SCNC: 143 MMOL/L
WBC # FLD AUTO: 5.41 K/UL

## 2023-02-10 PROCEDURE — 93000 ELECTROCARDIOGRAM COMPLETE: CPT

## 2023-02-10 PROCEDURE — 99214 OFFICE O/P EST MOD 30 MIN: CPT | Mod: 25

## 2023-02-10 RX ORDER — METHYLPREDNISOLONE 4 MG/1
4 TABLET ORAL
Qty: 1 | Refills: 0 | Status: DISCONTINUED | COMMUNITY
Start: 2022-10-14 | End: 2023-02-10

## 2023-02-10 RX ORDER — FLUTICASONE PROPIONATE AND SALMETEROL 50; 250 UG/1; UG/1
250-50 POWDER RESPIRATORY (INHALATION)
Qty: 3 | Refills: 6 | Status: DISCONTINUED | COMMUNITY
End: 2023-02-10

## 2023-02-10 RX ORDER — AZITHROMYCIN 250 MG/1
250 TABLET, FILM COATED ORAL
Qty: 1 | Refills: 0 | Status: DISCONTINUED | COMMUNITY
Start: 2022-10-14 | End: 2023-02-10

## 2023-02-10 RX ORDER — NYSTATIN 100000 1/G
100000 POWDER TOPICAL TWICE DAILY
Qty: 1 | Refills: 2 | Status: ACTIVE | COMMUNITY
Start: 2022-08-23 | End: 1900-01-01

## 2023-02-10 NOTE — HISTORY OF PRESENT ILLNESS
[FreeTextEntry1] : c/o sob, went to pulm yesterday, had high d dimer, going for ct scan of chest today to r/o PE; pt off blood thinners since November\par c/o depression, trouble sleeping, would like increase in meds\par wants to cut down on meds due to cost, starting w/ buspar\par

## 2023-02-10 NOTE — PHYSICAL EXAM
[No Acute Distress] : no acute distress [Normal Sclera/Conjunctiva] : normal sclera/conjunctiva [EOMI] : extraocular movements intact [Normal Outer Ear/Nose] : the outer ears and nose were normal in appearance [Normal] : normal rate, regular rhythm, normal S1 and S2 and no murmur heard [Coordination Grossly Intact] : coordination grossly intact [Normal Affect] : the affect was normal [Alert and Oriented x3] : oriented to person, place, and time [Normal Insight/Judgement] : insight and judgment were intact [de-identified] : obese [de-identified] : ambulates w/ walker

## 2023-02-10 NOTE — PLAN
[FreeTextEntry1] : f/u w/ ct scan this pm\par increased trazadone and sertraline\par allowed to vent, emotional support provided\par speak to sw about med costs

## 2023-02-13 RX ORDER — WARFARIN 4 MG/1
4 TABLET ORAL
Qty: 45 | Refills: 1 | Status: DISCONTINUED | COMMUNITY
Start: 2022-09-20 | End: 2023-02-13

## 2023-02-13 RX ORDER — RIVAROXABAN 15 MG-20MG
15 & 20 KIT ORAL
Qty: 1 | Refills: 0 | Status: DISCONTINUED | COMMUNITY
Start: 2023-02-10 | End: 2023-02-13

## 2023-02-14 ENCOUNTER — APPOINTMENT (OUTPATIENT)
Dept: UROLOGY | Facility: CLINIC | Age: 73
End: 2023-02-14

## 2023-02-27 ENCOUNTER — APPOINTMENT (OUTPATIENT)
Dept: PULMONOLOGY | Facility: CLINIC | Age: 73
End: 2023-02-27
Payer: MEDICARE

## 2023-02-27 VITALS — SYSTOLIC BLOOD PRESSURE: 123 MMHG | HEART RATE: 65 BPM | DIASTOLIC BLOOD PRESSURE: 75 MMHG | OXYGEN SATURATION: 95 %

## 2023-02-27 PROCEDURE — 36415 COLL VENOUS BLD VENIPUNCTURE: CPT

## 2023-02-27 PROCEDURE — 99215 OFFICE O/P EST HI 40 MIN: CPT | Mod: 25

## 2023-02-27 PROCEDURE — 94618 PULMONARY STRESS TESTING: CPT

## 2023-02-27 NOTE — CONSULT LETTER
[Please see my note below.] : Please see my note below. [Consult Closing:] : Thank you very much for allowing me to participate in the care of this patient.  If you have any questions, please do not hesitate to contact me. [Sincerely,] : Sincerely, [FreeTextEntry3] : Cholo Browning D.O., CODI\par  of Medicine\par [facility] [employer]\par

## 2023-02-27 NOTE — HISTORY OF PRESENT ILLNESS
[TextBox_4] : 71-year-old female\par Post elevated CT PA with pos  PE  recurrent\par  placed on Eliquis protocol\par Remains on TRELEGY with no decline resp sxs\par exertional chest tightness\par Chief complaint cough chest congestion\par Not start any wheezing\par She states home O2 saturations ranging from 92 to 95 96%\par She does have history of pulmonary embolus currently on active therapy with Coumadin\par She is not describing any pleuritic chest pain\par Noted that a review of her prior PT/INR over the past several weeks including September 27, 2022 she was therapeutic with an INR of 2.6\par Pression COPD rule out exacerbation flare\par Pulmonary embolism on active Coumadin (INR therapeutic\par Treatment Z-Munir\par Medrol Dosepak\par \par since last visit required in hot weather required LIAN with chest congestion with relief\par No fever or sputum\par NO pleuritic CP \par Sleep study -severe XENIA\par Pulmonary referral\par Additional information provided at today's visit including a more detailed chart review available with notes provided dating back to 2016 and\par Patient had an orthopedic surgical procedure which was complicated by a postoperative provoked pulmonary embolism\par This by definition and is the second provoked pulmonary embolism post COVID-19 infection\par Complicated pulmonary history\par nocturnal cough\par Chart reviewed\par History COPD \par Patient is status post [facility] May 7, 2022\par COVID positive\par CT scan of the chest demonstrated minimally of acute left lateral 5 through 7 ribs fracture within hematoma of the chest wall post fall on\par Also detected pulmonary embolism of the segmental arteries at the left lower lobe right lower lobe right upper lobe\par Ultrasound Doppler was negative for DVT\par Patient was initially sent home on Eliquis but it was too expensive and switched to Xarelto which patient states was giving her GI symptomatology with diarrhea and subsequently was switched to Coumadin\par Should be noted at today's visit the last INR on July 5, 2022 was 6.9 was advised to hold the Coumadin and then start 2 mg daily after 3 days with repeat evaluation\par Management per primary care physician\par Present is not complaining of any significant left-sided rib pain\par Cough additional complications include patient in a walking boot for Achilles tendinitis\par Also reports a prior history of pneumonia x2 remotely and bronchitis\par Does not describe any recent hospitalizations requiring steroids for COPD\par Has a subacute chronic cough\par Does not describe any wheezing chest congestion chest tightness at rest pleuritic chest pain at present hemoptysis\par No reported purulent sputum\par Patient states she was seen by cardiology reportedly had echocardiogram it was okay I did tell the cardiologist there was a component of exertional chest discomfort\par Patient received the COVID-vaccine Pfizer times 2+ a booster\par Reviewing her additional immunization profile she states flu shots are up-to-date\par She has previously received Prevnar 13 but not PCV 23\par Says has a very minimal tobacco history discontinued all tobacco dating back approximately 50 years\par No reported other history of chemical toxic inhalational exposures\par  [TextBox_19] : Reported positive history obstructive sleep apnea, insomnia

## 2023-02-27 NOTE — DISCUSSION/SUMMARY
[FreeTextEntry1] : Recurrent pulmonary embolism on Eliquis protocol now on 5 mg 1 tablet p.o. twice daily\par Discussed and detail with patient understanding that treatment protocol is long-term lifelong unless there is a impending issues bleeding etc. that require adjustment\par Cardiology follow-up baseline echocardiogram\par \par \par CT chest angiogram official report\par Positive pulmonary embolism\par Subsegmental filling defects right upper lobe right lower lobe left upper lobe new compared to the prior study\par Patient is hesitant to be on any anticoagulation\par I spoke to her about the difficulty with Coumadin particularly since she did not go to the hospital for bridging as recommended dosing will require multiple INR through the week a lab or office setting which she is not inclined to do\par She feels like Xarelto upsets her stomach\par We do therefore recommend a trial of Eliquis\par Protocol is 10 mg twice daily for the first 7 days of therapy and then 5 mg 1 tablet p.o. twice daily\par \par Also addressed the patient has a recurrent pulmonary embolism and therefore long-term therapy needs to be addressed at which she again is not inclined to want to complete\par Address 3 to 6-month follow-up CT angiogram protocol\par \par cardiology ECHO  recommended \par \par COPD pulmonary physiology  with decline at DLCO and lung volumes \par Pos exertional dyspnea  r/o recurrent PE as etiology vs cardiac ischemic disease\par Obesity\par Pulmonary embolism Off AC\par Provoked pulmonary embolism dating back to early May 2022 with COVID-19 infection\par Gating issue based on additional historical data and chart review this is the second provoked pulmonary embolism with a prior dating back to 2016 BUT off AC\par Status post chest wall trauma with left rib fractures\par Component of obesity and physical deconditioning contributing to exertional dyspnea\par XENIA on CPAP\par Recommendations\par Issue based on 2 pulmonary embolisms whether patient should receive long-term anticoagulation as opposed to just a 6-month protocol\par Discussed in detail with patient on\par If long-term anticoagulation was indicated with prefer to use low-dose Xarelto 10 mg as opposed to Coumadin with its difficulties in maintaining therapeutic INR and this was discussed with patient\par In addition we will hold off but eventually patient will get an extensive hypercoagulable work-up in the office\par At 6 months we will also repeat a CT angiogram protocol to confirm clot clearance and a lower extremity DVT and then make final decision with input from primary care physician\par Baseline D-dimer nl\par As noted Coumadin adjustments per PMD\par I did explain that ideally oral anticoagulation with medication such as Eliquis or Xarelto would be easier without management of Coumadin dosing in therapeutic range but she is convinced that she had significant GI side effects with Xarelto \par At 6 months can repeat a CT angiogram protocol and then if evidence of discontinuation for Coumadin can then follow-up with hypercoagulable work-up for completeness\par At present completed 6/6 months of anticoagulation- get CT CHEST PA  and if neg d/c coumadin COMPLETED\par recheck D DIMER CBC Renal fx\par \par Patient is requesting accommodations at work because of 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  My office follow-up 1 month\par  Order RESMED CPAP \par Setting CPAP 8.0 cm H20 Nasal Pillow\par F/u Overnight Oximetry\par  Coumadin OFF\par \par NOTED Hold Advair and Change Trelegy 200 mcg 1 puff QD\par strong recommended COVID Bi Valent booster

## 2023-02-27 NOTE — PROCEDURE
[FreeTextEntry1] : Pulmonary 6-minute walk exercise study February 20 20,023\par Indication pulmonary embolism shortness of breath atypical chest pain\par Baseline O2 saturation 97%\par Sigifredo desaturation to 92%\par Patient ambulated with walker only\par No evidence or indication for portable oxygen therapy protocol\par \par PFT feb 9 2023\par  Flow rates taylor nl\par  TLC 74 %\par  DLCO  57 % with mod  loss fx  alveolar capillary units HGB 13.1\par HGB 13.1\par \par CPAP data compliance\par Data unavailable to January 1, 2023\par Affect 100 symptoms 96.7%\par AHI 7.4\par Encouraged increased usage\par \par Taylor 12/8/22\par Mild dec FVC\par stable\par \par CT chest November 7, 2022\par CT angiogram\par Indication prior pulmonary embolism\par Comparison study of July 30, 2020  report no filling infection\par Negative pulmonary embolism\par no dissection\par Pulmonary arteries normal caliber\par 3 mm subpleural pulmonary nodule right upper lobe\par No evidence of a CT angiogram findings of a pulmonary embolism with resolution and no residual we will discontinue Coumadin treatment protocol\par \par Sleep study\par August 15 August 72,022\par Severe obstructive sleep apnea\par AHI 33\par Positional component\par Insufficient nonsupine study time\par Present time less than 90% saturation on room air 13.4%\par Greater than 30 dB 63%\par Sigifredo desaturation 77.8%\par Impression severe obstructive sleep apnea\par Address treatment protocol focus primarily on CPAP\par On treatment with CPAP we will then require overnight oximetry to use to evaluate for the noted severe oxygen desaturation also can address an attended titration study in view of the severe oxygen desaturation as an alternative\par \par Pulmonary 6 minute exercise study  8/4/2\par ambulated with walker\par  RA O2 sat 96 % \par No RA desaturation\par \par PFT July 6, 2022\par Mild reduction in flow rates\par FVC 70% predicted\par TLC normal range 82% predicted\par RV/TLC ratio 141% predicted\par Diffusion 79% predicted\par Hemoglobin 13.7\par Impression air trapping\par Sawtooth pattern clinical correlation consistent with obstructive sleep apnea rule out noted that this patient has prior data dating back to February 4, 2007 there is some noted decline at the flow rates with stable diffusion and lung volumes\par \par Chest x-ray PA lateral 7/6/22\par Cardiac size normal\par Lung fields clear\par No parenchymal infiltrates pleural effusions or dominant pulmonary nodules\par No pneumothorax\par Pulmonary artery size grossly normal based on plain film

## 2023-02-28 ENCOUNTER — NON-APPOINTMENT (OUTPATIENT)
Age: 73
End: 2023-02-28

## 2023-02-28 LAB
ANION GAP SERPL CALC-SCNC: 13 MMOL/L
BASOPHILS # BLD AUTO: 0.03 K/UL
BASOPHILS NFR BLD AUTO: 0.5 %
BUN SERPL-MCNC: 15 MG/DL
CALCIUM SERPL-MCNC: 9.5 MG/DL
CHLORIDE SERPL-SCNC: 105 MMOL/L
CO2 SERPL-SCNC: 22 MMOL/L
CREAT SERPL-MCNC: 0.96 MG/DL
DEPRECATED D DIMER PPP IA-ACNC: <150 NG/ML DDU
EGFR: 62 ML/MIN/1.73M2
EOSINOPHIL # BLD AUTO: 0.14 K/UL
EOSINOPHIL NFR BLD AUTO: 2.3 %
GLUCOSE SERPL-MCNC: 86 MG/DL
HCT VFR BLD CALC: 37.9 %
HGB BLD-MCNC: 12.6 G/DL
IMM GRANULOCYTES NFR BLD AUTO: 0.2 %
LYMPHOCYTES # BLD AUTO: 0.7 K/UL
LYMPHOCYTES NFR BLD AUTO: 11.6 %
MAN DIFF?: NORMAL
MCHC RBC-ENTMCNC: 29.7 PG
MCHC RBC-ENTMCNC: 33.2 GM/DL
MCV RBC AUTO: 89.4 FL
MONOCYTES # BLD AUTO: 0.48 K/UL
MONOCYTES NFR BLD AUTO: 7.9 %
NEUTROPHILS # BLD AUTO: 4.7 K/UL
NEUTROPHILS NFR BLD AUTO: 77.5 %
PLATELET # BLD AUTO: 268 K/UL
POTASSIUM SERPL-SCNC: 3.9 MMOL/L
RBC # BLD: 4.24 M/UL
RBC # FLD: 12.8 %
SODIUM SERPL-SCNC: 140 MMOL/L
WBC # FLD AUTO: 6.06 K/UL

## 2023-03-01 ENCOUNTER — LABORATORY RESULT (OUTPATIENT)
Age: 73
End: 2023-03-01

## 2023-03-01 ENCOUNTER — APPOINTMENT (OUTPATIENT)
Dept: CARDIOLOGY | Facility: CLINIC | Age: 73
End: 2023-03-01
Payer: MEDICARE

## 2023-03-01 ENCOUNTER — NON-APPOINTMENT (OUTPATIENT)
Age: 73
End: 2023-03-01

## 2023-03-01 VITALS
SYSTOLIC BLOOD PRESSURE: 128 MMHG | BODY MASS INDEX: 37.91 KG/M2 | HEART RATE: 69 BPM | TEMPERATURE: 98.8 F | HEIGHT: 62 IN | OXYGEN SATURATION: 94 % | DIASTOLIC BLOOD PRESSURE: 70 MMHG | WEIGHT: 206 LBS

## 2023-03-01 DIAGNOSIS — R79.89 OTHER SPECIFIED ABNORMAL FINDINGS OF BLOOD CHEMISTRY: ICD-10-CM

## 2023-03-01 DIAGNOSIS — Z87.891 PERSONAL HISTORY OF NICOTINE DEPENDENCE: ICD-10-CM

## 2023-03-01 PROCEDURE — 99214 OFFICE O/P EST MOD 30 MIN: CPT

## 2023-03-01 PROCEDURE — 93000 ELECTROCARDIOGRAM COMPLETE: CPT

## 2023-03-01 RX ORDER — APIXABAN 5 MG (74)
5 KIT ORAL
Refills: 0 | Status: DISCONTINUED | COMMUNITY
Start: 2023-02-13 | End: 2023-03-01

## 2023-03-01 RX ORDER — CLOTRIMAZOLE AND BETAMETHASONE DIPROPIONATE 10; .5 MG/G; MG/G
1-0.05 CREAM TOPICAL TWICE DAILY
Qty: 1 | Refills: 1 | Status: ACTIVE | COMMUNITY
Start: 2023-03-01 | End: 1900-01-01

## 2023-03-01 NOTE — PHYSICAL EXAM

## 2023-03-01 NOTE — HISTORY OF PRESENT ILLNESS
[FreeTextEntry1] : This is a 71 y/o female with a pmhx of COPD, PE here today  to establish cardiac care. She was referred by Dr. Browning. She reports dyspnea with minimal exertion x 1 month. Patient also reports chest "pressure" with activity relieved by rest x 1 month. \par Patient was experiencing SOB, she went to pulm and was sent for CTA which revealed B/L PE. She reports she has had 2 prior PE, one post op and one s/p fall with broken ribs. \par She admits to dizziness. She denies palpitations or syncope. She reports 2 days ago she woke up with red circular michael on left anterior shin. She denies fever, chills, mild pain and itching. She denies trauma

## 2023-03-01 NOTE — REVIEW OF SYSTEMS
[Negative] : Heme/Lymph [Dyspnea on exertion] : dyspnea during exertion [Chest Discomfort] : chest discomfort [Dizziness] : dizziness [SOB] : no shortness of breath [Lower Ext Edema] : no extremity edema [Leg Claudication] : no intermittent leg claudication [Palpitations] : no palpitations [Orthopnea] : no orthopnea [PND] : no PND [Syncope] : no syncope [Tremor] : no tremor was seen [Numbness (Hypoesthesia)] : no numbness [Convulsions] : no convulsions [Tingling (Paresthesia)] : no tingling [Weakness] : no weakness [Limb Weakness (Paresis)] : no limb weakness (Paresis) [Speech Disturbance] : no speech disturbance

## 2023-03-02 ENCOUNTER — APPOINTMENT (OUTPATIENT)
Dept: CARDIOLOGY | Facility: CLINIC | Age: 73
End: 2023-03-02
Payer: MEDICARE

## 2023-03-02 PROCEDURE — 93880 EXTRACRANIAL BILAT STUDY: CPT

## 2023-03-02 PROCEDURE — 93970 EXTREMITY STUDY: CPT

## 2023-03-02 PROCEDURE — 93306 TTE W/DOPPLER COMPLETE: CPT

## 2023-03-08 ENCOUNTER — NON-APPOINTMENT (OUTPATIENT)
Age: 73
End: 2023-03-08

## 2023-03-08 ENCOUNTER — APPOINTMENT (OUTPATIENT)
Dept: INTERNAL MEDICINE | Facility: CLINIC | Age: 73
End: 2023-03-08
Payer: MEDICARE

## 2023-03-08 VITALS
OXYGEN SATURATION: 95 % | SYSTOLIC BLOOD PRESSURE: 120 MMHG | WEIGHT: 206 LBS | BODY MASS INDEX: 37.91 KG/M2 | HEART RATE: 71 BPM | DIASTOLIC BLOOD PRESSURE: 70 MMHG | HEIGHT: 62 IN

## 2023-03-08 VITALS — HEART RATE: 70 BPM | OXYGEN SATURATION: 96 %

## 2023-03-08 LAB
ALBUMIN SERPL ELPH-MCNC: 4.6 G/DL
ALP BLD-CCNC: 109 U/L
ALT SERPL-CCNC: 20 U/L
ANION GAP SERPL CALC-SCNC: 14 MMOL/L
AST SERPL-CCNC: 18 U/L
BASOPHILS # BLD AUTO: 0.03 K/UL
BASOPHILS NFR BLD AUTO: 0.5 %
BILIRUB SERPL-MCNC: 0.3 MG/DL
BUN SERPL-MCNC: 15 MG/DL
CALCIUM SERPL-MCNC: 10 MG/DL
CHLORIDE SERPL-SCNC: 104 MMOL/L
CO2 SERPL-SCNC: 24 MMOL/L
CREAT SERPL-MCNC: 0.94 MG/DL
EGFR: 64 ML/MIN/1.73M2
EOSINOPHIL # BLD AUTO: 0.06 K/UL
EOSINOPHIL NFR BLD AUTO: 1.1 %
GLUCOSE SERPL-MCNC: 86 MG/DL
HCT VFR BLD CALC: 41.1 %
HGB BLD-MCNC: 12.8 G/DL
IMM GRANULOCYTES NFR BLD AUTO: 0.2 %
LYMPHOCYTES # BLD AUTO: 0.65 K/UL
LYMPHOCYTES NFR BLD AUTO: 11.9 %
MAN DIFF?: NORMAL
MCHC RBC-ENTMCNC: 28.7 PG
MCHC RBC-ENTMCNC: 31.1 GM/DL
MCV RBC AUTO: 92.2 FL
MONOCYTES # BLD AUTO: 0.37 K/UL
MONOCYTES NFR BLD AUTO: 6.8 %
NEUTROPHILS # BLD AUTO: 4.36 K/UL
NEUTROPHILS NFR BLD AUTO: 79.5 %
NT-PROBNP SERPL-MCNC: 134 PG/ML
PLATELET # BLD AUTO: 223 K/UL
POTASSIUM SERPL-SCNC: 4 MMOL/L
PROT SERPL-MCNC: 6.6 G/DL
RBC # BLD: 4.46 M/UL
RBC # FLD: 12.7 %
SODIUM SERPL-SCNC: 142 MMOL/L
TROPONIN-T, HIGH SENSITIVITY: 8 NG/L
WBC # FLD AUTO: 5.48 K/UL

## 2023-03-08 PROCEDURE — G0444 DEPRESSION SCREEN ANNUAL: CPT | Mod: 59

## 2023-03-08 PROCEDURE — 93000 ELECTROCARDIOGRAM COMPLETE: CPT | Mod: 59

## 2023-03-08 PROCEDURE — 99214 OFFICE O/P EST MOD 30 MIN: CPT | Mod: 25

## 2023-03-08 NOTE — HEALTH RISK ASSESSMENT
[0] : 2) Feeling down, depressed, or hopeless: Not at all (0) [PHQ-2 Negative - No further assessment needed] : PHQ-2 Negative - No further assessment needed [[id number]] : 0

## 2023-03-08 NOTE — HISTORY OF PRESENT ILLNESS
[FreeTextEntry8] : BAILEY CISNEROS 71 y/o female with a pmhx of HTN, anxiety, anemia, COPD, recurrent PE on eliquis here today for ongoing chest tightness and MINOR for the past 1-2 months. She was seen by cardio Dr acosta 1 week ago and rec for TTE and CTAC. TTE showed normal EF, CTAC pending this friday 3/10.  Pt also c/o a rash on the L lower extremity, was started on augmentin about 1 week ago and her rash got lighter in color but spread slightly larger, no warmth, f/c, pain. Pt has 1-2 more day left of antibiotics. \par Denies, dizziness, diaphoresis, palpitations, LE swelling, orthopnea, syncope, n/v, headache.\par  No

## 2023-03-08 NOTE — PHYSICAL EXAM
[No Acute Distress] : no acute distress [Well Nourished] : well nourished [Well Developed] : well developed [Well-Appearing] : well-appearing [Normal Sclera/Conjunctiva] : normal sclera/conjunctiva [Normal Outer Ear/Nose] : the outer ears and nose were normal in appearance [Normal Oropharynx] : the oropharynx was normal [No JVD] : no jugular venous distention [No Lymphadenopathy] : no lymphadenopathy [Supple] : supple [No Respiratory Distress] : no respiratory distress  [No Accessory Muscle Use] : no accessory muscle use [Clear to Auscultation] : lungs were clear to auscultation bilaterally [Normal Rate] : normal rate  [Regular Rhythm] : with a regular rhythm [Normal S1, S2] : normal S1 and S2 [No Murmur] : no murmur heard [No Carotid Bruits] : no carotid bruits [No Varicosities] : no varicosities [Pedal Pulses Present] : the pedal pulses are present [No Edema] : there was no peripheral edema [No Extremity Clubbing/Cyanosis] : no extremity clubbing/cyanosis [Soft] : abdomen soft [Non Tender] : non-tender [Non-distended] : non-distended [Normal Bowel Sounds] : normal bowel sounds [Normal Anterior Cervical Nodes] : no anterior cervical lymphadenopathy [No CVA Tenderness] : no CVA  tenderness [No Spinal Tenderness] : no spinal tenderness [No Joint Swelling] : no joint swelling [Grossly Normal Strength/Tone] : grossly normal strength/tone [No Rash] : no rash [Coordination Grossly Intact] : coordination grossly intact [No Focal Deficits] : no focal deficits [Normal Gait] : normal gait [Normal Affect] : the affect was normal [Alert and Oriented x3] : oriented to person, place, and time [de-identified] : on RA

## 2023-03-08 NOTE — REVIEW OF SYSTEMS
[Negative] : Heme/Lymph [Dyspnea on Exertion] : dyspnea on exertion [Shortness Of Breath] : no shortness of breath [Wheezing] : no wheezing [Cough] : no cough

## 2023-03-09 LAB
B BURGDOR AB SER-IMP: NEGATIVE
B BURGDOR IGG+IGM SER QL: 0.04 INDEX

## 2023-03-10 ENCOUNTER — APPOINTMENT (OUTPATIENT)
Dept: CARDIOLOGY | Facility: CLINIC | Age: 73
End: 2023-03-10

## 2023-03-10 ENCOUNTER — OUTPATIENT (OUTPATIENT)
Dept: OUTPATIENT SERVICES | Facility: HOSPITAL | Age: 73
LOS: 1 days | End: 2023-03-10
Payer: MEDICARE

## 2023-03-10 DIAGNOSIS — K43.2 INCISIONAL HERNIA WITHOUT OBSTRUCTION OR GANGRENE: Chronic | ICD-10-CM

## 2023-03-10 DIAGNOSIS — N81.10 CYSTOCELE, UNSPECIFIED: Chronic | ICD-10-CM

## 2023-03-10 DIAGNOSIS — Z90.49 ACQUIRED ABSENCE OF OTHER SPECIFIED PARTS OF DIGESTIVE TRACT: Chronic | ICD-10-CM

## 2023-03-10 DIAGNOSIS — Z96.659 PRESENCE OF UNSPECIFIED ARTIFICIAL KNEE JOINT: Chronic | ICD-10-CM

## 2023-03-10 DIAGNOSIS — R06.02 SHORTNESS OF BREATH: ICD-10-CM

## 2023-03-10 DIAGNOSIS — Z90.710 ACQUIRED ABSENCE OF BOTH CERVIX AND UTERUS: Chronic | ICD-10-CM

## 2023-03-10 DIAGNOSIS — R07.89 OTHER CHEST PAIN: ICD-10-CM

## 2023-03-10 PROCEDURE — 75574 CT ANGIO HRT W/3D IMAGE: CPT | Mod: 26,MH

## 2023-03-10 PROCEDURE — 75574 CT ANGIO HRT W/3D IMAGE: CPT

## 2023-03-11 ENCOUNTER — TRANSCRIPTION ENCOUNTER (OUTPATIENT)
Age: 73
End: 2023-03-11

## 2023-03-13 ENCOUNTER — APPOINTMENT (OUTPATIENT)
Dept: CT IMAGING | Facility: IMAGING CENTER | Age: 73
End: 2023-03-13

## 2023-03-13 ENCOUNTER — NON-APPOINTMENT (OUTPATIENT)
Age: 73
End: 2023-03-13

## 2023-03-13 ENCOUNTER — OUTPATIENT (OUTPATIENT)
Dept: OUTPATIENT SERVICES | Facility: HOSPITAL | Age: 73
LOS: 1 days | End: 2023-03-13
Payer: MEDICARE

## 2023-03-13 ENCOUNTER — APPOINTMENT (OUTPATIENT)
Dept: CARDIOLOGY | Facility: CLINIC | Age: 73
End: 2023-03-13
Payer: MEDICARE

## 2023-03-13 VITALS
HEART RATE: 65 BPM | BODY MASS INDEX: 38.09 KG/M2 | OXYGEN SATURATION: 97 % | DIASTOLIC BLOOD PRESSURE: 70 MMHG | TEMPERATURE: 98.8 F | SYSTOLIC BLOOD PRESSURE: 150 MMHG | WEIGHT: 207 LBS | HEIGHT: 62 IN

## 2023-03-13 DIAGNOSIS — Z96.659 PRESENCE OF UNSPECIFIED ARTIFICIAL KNEE JOINT: Chronic | ICD-10-CM

## 2023-03-13 DIAGNOSIS — Z90.710 ACQUIRED ABSENCE OF BOTH CERVIX AND UTERUS: Chronic | ICD-10-CM

## 2023-03-13 DIAGNOSIS — R94.31 ABNORMAL ELECTROCARDIOGRAM [ECG] [EKG]: ICD-10-CM

## 2023-03-13 DIAGNOSIS — N81.10 CYSTOCELE, UNSPECIFIED: Chronic | ICD-10-CM

## 2023-03-13 DIAGNOSIS — Z90.49 ACQUIRED ABSENCE OF OTHER SPECIFIED PARTS OF DIGESTIVE TRACT: Chronic | ICD-10-CM

## 2023-03-13 DIAGNOSIS — R42 DIZZINESS AND GIDDINESS: ICD-10-CM

## 2023-03-13 DIAGNOSIS — I26.99 OTHER PULMONARY EMBOLISM WITHOUT ACUTE COR PULMONALE: ICD-10-CM

## 2023-03-13 PROCEDURE — 70450 CT HEAD/BRAIN W/O DYE: CPT | Mod: 26,MH

## 2023-03-13 PROCEDURE — 70450 CT HEAD/BRAIN W/O DYE: CPT

## 2023-03-13 PROCEDURE — 93000 ELECTROCARDIOGRAM COMPLETE: CPT

## 2023-03-13 PROCEDURE — 71275 CT ANGIOGRAPHY CHEST: CPT | Mod: 26,MH

## 2023-03-13 PROCEDURE — 71275 CT ANGIOGRAPHY CHEST: CPT

## 2023-03-13 PROCEDURE — 99214 OFFICE O/P EST MOD 30 MIN: CPT

## 2023-03-13 RX ORDER — APIXABAN 5 MG/1
5 TABLET, FILM COATED ORAL
Qty: 180 | Refills: 1 | Status: DISCONTINUED | COMMUNITY
Start: 2023-03-01 | End: 2023-03-13

## 2023-03-13 RX ORDER — ENOXAPARIN SODIUM 100 MG/ML
100 INJECTION, SOLUTION SUBCUTANEOUS
Qty: 0.9 | Refills: 0 | Status: DISCONTINUED | COMMUNITY
Start: 2023-03-13 | End: 2023-03-13

## 2023-03-13 NOTE — HISTORY OF PRESENT ILLNESS
[FreeTextEntry1] : This is a 73 y/o female with a pmhx of COPD, PE here today for a follow up. She was seen as a new patient 3/1/23 with complaints of chest pressure and MINOR. Patient continues to reports SO, described as "plastic bag over face". She reports the skin rash has improved, s/p augmentin. She reports dizziness, Patient reports she fell last night and hit head. Denies headache or blurry vision. \par

## 2023-03-13 NOTE — REVIEW OF SYSTEMS
[Dyspnea on exertion] : dyspnea during exertion [Chest Discomfort] : chest discomfort [Dizziness] : dizziness [Negative] : Heme/Lymph [SOB] : no shortness of breath [Lower Ext Edema] : no extremity edema [Leg Claudication] : no intermittent leg claudication [Palpitations] : no palpitations [Orthopnea] : no orthopnea [PND] : no PND [Syncope] : no syncope [Tremor] : no tremor was seen [Numbness (Hypoesthesia)] : no numbness [Convulsions] : no convulsions [Tingling (Paresthesia)] : no tingling [Weakness] : no weakness [Limb Weakness (Paresis)] : no limb weakness (Paresis) [Speech Disturbance] : no speech disturbance

## 2023-03-15 ENCOUNTER — APPOINTMENT (OUTPATIENT)
Dept: INTERNAL MEDICINE | Facility: CLINIC | Age: 73
End: 2023-03-15
Payer: MEDICARE

## 2023-03-15 VITALS
HEIGHT: 62 IN | DIASTOLIC BLOOD PRESSURE: 80 MMHG | BODY MASS INDEX: 38.09 KG/M2 | SYSTOLIC BLOOD PRESSURE: 130 MMHG | WEIGHT: 207 LBS | OXYGEN SATURATION: 97 % | HEART RATE: 66 BPM

## 2023-03-15 DIAGNOSIS — L98.9 DISORDER OF THE SKIN AND SUBCUTANEOUS TISSUE, UNSPECIFIED: ICD-10-CM

## 2023-03-15 DIAGNOSIS — Z12.9 ENCOUNTER FOR SCREENING FOR MALIGNANT NEOPLASM, SITE UNSPECIFIED: ICD-10-CM

## 2023-03-15 PROCEDURE — 99214 OFFICE O/P EST MOD 30 MIN: CPT

## 2023-03-15 RX ORDER — METHYLPREDNISOLONE 4 MG/1
4 TABLET ORAL
Qty: 1 | Refills: 0 | Status: DISCONTINUED | COMMUNITY
Start: 2023-02-15 | End: 2023-03-15

## 2023-03-15 RX ORDER — AMOXICILLIN AND CLAVULANATE POTASSIUM 875; 125 MG/1; MG/1
875-125 TABLET, COATED ORAL TWICE DAILY
Qty: 14 | Refills: 0 | Status: DISCONTINUED | COMMUNITY
Start: 2023-03-01 | End: 2023-03-15

## 2023-03-15 NOTE — PHYSICAL EXAM
[No Acute Distress] : no acute distress [Well Nourished] : well nourished [Well Developed] : well developed [Well-Appearing] : well-appearing [Normal Sclera/Conjunctiva] : normal sclera/conjunctiva [Normal Outer Ear/Nose] : the outer ears and nose were normal in appearance [Normal Oropharynx] : the oropharynx was normal [No JVD] : no jugular venous distention [No Lymphadenopathy] : no lymphadenopathy [Supple] : supple [No Respiratory Distress] : no respiratory distress  [No Accessory Muscle Use] : no accessory muscle use [Clear to Auscultation] : lungs were clear to auscultation bilaterally [Normal Rate] : normal rate  [Regular Rhythm] : with a regular rhythm [Normal S1, S2] : normal S1 and S2 [No Murmur] : no murmur heard [No Carotid Bruits] : no carotid bruits [No Varicosities] : no varicosities [Pedal Pulses Present] : the pedal pulses are present [No Edema] : there was no peripheral edema [No Extremity Clubbing/Cyanosis] : no extremity clubbing/cyanosis [Soft] : abdomen soft [Non Tender] : non-tender [Non-distended] : non-distended [Normal Bowel Sounds] : normal bowel sounds [Normal Anterior Cervical Nodes] : no anterior cervical lymphadenopathy [No CVA Tenderness] : no CVA  tenderness [No Spinal Tenderness] : no spinal tenderness [No Joint Swelling] : no joint swelling [Grossly Normal Strength/Tone] : grossly normal strength/tone [Coordination Grossly Intact] : coordination grossly intact [No Focal Deficits] : no focal deficits [Normal Gait] : normal gait [Normal Affect] : the affect was normal [Alert and Oriented x3] : oriented to person, place, and time [de-identified] : on RA [de-identified] : small faded area of erythema on LLE, no warmth tenderness swelling, improved from prior

## 2023-03-15 NOTE — HISTORY OF PRESENT ILLNESS
[FreeTextEntry1] : fleming [de-identified] : BAILEY CISNEROS 71 y/o female with a pmhx of HTN, anxiety, anemia, COPD, recent recurrent PE  on 2/10/23 started on eliquis here for ongoing and MINOR for the past 1-2 months. Pt had repeat CTA chest with no showed no PE on 3/13/23. Also had CTAC on 3/10/23 which showed nonobstructive CAD with CA score of 172. Ct head also negative for acute findings. Pt says she still has MINOR and pt says the eliquis is causing it. Pt saw Dr Nobles on 3/13 and lovenox was not covered thus was not switched off eliquis. She has appt with pulm Dr Browning on 3/17 who will decide if a/c needs to \par Leg rash better, nearly resolved.\par Denies chest pain,  dizziness, diaphoresis, palpitations, LE swelling, orthopnea, syncope, n/v, headache.\par Denies melena, hematochezia, hematemesis, or any bleeding.\par \par

## 2023-03-16 ENCOUNTER — APPOINTMENT (OUTPATIENT)
Dept: PULMONOLOGY | Facility: CLINIC | Age: 73
End: 2023-03-16
Payer: MEDICARE

## 2023-03-16 VITALS — HEART RATE: 65 BPM | SYSTOLIC BLOOD PRESSURE: 130 MMHG | DIASTOLIC BLOOD PRESSURE: 73 MMHG | OXYGEN SATURATION: 98 %

## 2023-03-16 DIAGNOSIS — R53.81 OTHER MALAISE: ICD-10-CM

## 2023-03-16 PROCEDURE — 99214 OFFICE O/P EST MOD 30 MIN: CPT

## 2023-03-16 NOTE — CONSULT LETTER
[Please see my note below.] : Please see my note below. [Consult Closing:] : Thank you very much for allowing me to participate in the care of this patient.  If you have any questions, please do not hesitate to contact me. [Sincerely,] : Sincerely, [FreeTextEntry3] : Cholo Browning D.O., CODI\par  of Medicine\par [facility] [employer]\par

## 2023-03-16 NOTE — HISTORY OF PRESENT ILLNESS
[TextBox_4] : 71-year-old female\par repeat  CT PA per DR Nobles\par Neg PE\par Clear lungs\par \par Post elevated CT PA with pos  PE  recurrent\par  placed on Eliquis protocol\par Remains on TRELEGY with no decline resp sxs\par exertional chest tightness\par Chief complaint cough chest congestion\par Not start any wheezing\par She states home O2 saturations ranging from 92 to 95 96%\par She does have history of pulmonary embolus currently on active therapy with Coumadin\par She is not describing any pleuritic chest pain\par Noted that a review of her prior PT/INR over the past several weeks including September 27, 2022 she was therapeutic with an INR of 2.6\par Pression COPD rule out exacerbation flare\par Pulmonary embolism on active Coumadin (INR therapeutic\par Treatment Z-Munir\par Medrol Dosepak\par \par since last visit required in hot weather required LIAN with chest congestion with relief\par No fever or sputum\par NO pleuritic CP \par Sleep study -severe XENIA\par Pulmonary referral\par Additional information provided at today's visit including a more detailed chart review available with notes provided dating back to 2016 and\par Patient had an orthopedic surgical procedure which was complicated by a postoperative provoked pulmonary embolism\par This by definition and is the second provoked pulmonary embolism post COVID-19 infection\par Complicated pulmonary history\par nocturnal cough\par Chart reviewed\par History COPD \par Patient is status post [facility] May 7, 2022\par COVID positive\par CT scan of the chest demonstrated minimally of acute left lateral 5 through 7 ribs fracture within hematoma of the chest wall post fall on\par Also detected pulmonary embolism of the segmental arteries at the left lower lobe right lower lobe right upper lobe\par Ultrasound Doppler was negative for DVT\par Patient was initially sent home on Eliquis but it was too expensive and switched to Xarelto which patient states was giving her GI symptomatology with diarrhea and subsequently was switched to Coumadin\par Should be noted at today's visit the last INR on July 5, 2022 was 6.9 was advised to hold the Coumadin and then start 2 mg daily after 3 days with repeat evaluation\par Management per primary care physician\par Present is not complaining of any significant left-sided rib pain\par Cough additional complications include patient in a walking boot for Achilles tendinitis\par Also reports a prior history of pneumonia x2 remotely and bronchitis\par Does not describe any recent hospitalizations requiring steroids for COPD\par Has a subacute chronic cough\par Does not describe any wheezing chest congestion chest tightness at rest pleuritic chest pain at present hemoptysis\par No reported purulent sputum\par Patient states she was seen by cardiology reportedly had echocardiogram it was okay I did tell the cardiologist there was a component of exertional chest discomfort\par Patient received the COVID-vaccine Pfizer times 2+ a booster\par Reviewing her additional immunization profile she states flu shots are up-to-date\par She has previously received Prevnar 13 but not PCV 23\par Says has a very minimal tobacco history discontinued all tobacco dating back approximately 50 years\par No reported other history of chemical toxic inhalational exposures\par  [TextBox_19] : Reported positive history obstructive sleep apnea, insomnia

## 2023-03-16 NOTE — REVIEW OF SYSTEMS
[Fever] : no fever [Fatigue] : fatigue [Chills] : no chills [GERD] : gerd [Depression] : depression [Anxiety] : no anxiety [Diabetes] : no diabetes [Thyroid Problem] : no thyroid problem [Negative] : Allergy/Immunology [TextBox_30] : HPI [TextBox_44] : Hypertension [TextBox_83] : History of a renal cyst [TextBox_94] : Arthritis [TextBox_122] : Balance disorder

## 2023-03-16 NOTE — DISCUSSION/SUMMARY
[FreeTextEntry1] : Recurrent pulmonary embolism on Eliquis protocol now on 5 mg 1 tablet p.o. twice daily\par Discussed and detail with patient understanding that treatment protocol is long-term lifelong unless there is a impending issues bleeding etc. that require adjustment\par Cardiology follow-up baseline echocardiogram\par Repeat CT PA per cardiology neg  and clear lungs\par \par CT chest angiogram official report\par Positive pulmonary embolism\par Subsegmental filling defects right upper lobe right lower lobe left upper lobe new compared to the prior study\par Patient is hesitant to be on any anticoagulation\par I spoke to her about the difficulty with Coumadin particularly since she did not go to the hospital for bridging as recommended dosing will require multiple INR through the week a lab or office setting which she is not inclined to do\par She feels like Xarelto upsets her stomach\par We do therefore recommend a trial of Eliquis\par Protocol is 10 mg twice daily for the first 7 days of therapy and then 5 mg 1 tablet p.o. twice daily\par \par Also addressed the patient has a recurrent pulmonary embolism and therefore long-term therapy needs to be addressed at which she again is not inclined to want to complete\par Address 3 to 6-month follow-up CT angiogram protocol\par \par cardiology ECHO  recommended \par \par COPD pulmonary physiology  with decline at DLCO and lung volumes \par Pos exertional dyspnea  r/o recurrent PE as etiology vs cardiac ischemic disease\par Obesity\par Pulmonary embolism Off AC\par Provoked pulmonary embolism dating back to early May 2022 with COVID-19 infection\par Gating issue based on additional historical data and chart review this is the second provoked pulmonary embolism with a prior dating back to 2016 BUT off AC\par Status post chest wall trauma with left rib fractures\par Component of obesity and physical deconditioning contributing to exertional dyspnea\par XEINA on CPAP\par Recommendations\par Issue based on 2 pulmonary embolisms whether patient should receive long-term anticoagulation as opposed to just a 6-month protocol\par Discussed in detail with patient on\par If long-term anticoagulation was indicated with prefer to use low-dose Xarelto 10 mg as opposed to Coumadin with its difficulties in maintaining therapeutic INR and this was discussed with patient\par In addition we will hold off but eventually patient will get an extensive hypercoagulable work-up in the office\par Baseline D-dimer nl\par As noted Coumadin adjustments per PMD\par I did explain that ideally oral anticoagulation with medication such as Eliquis or Xarelto would be easier without management of Coumadin dosing in therapeutic range but she is convinced that she had significant GI side effects with Xarelto \par At 6 months can repeat a CT angiogram protocol and then if evidence of discontinuation for Coumadin can then follow-up with hypercoagulable work-up for completeness\par \par RESMED CPAP \par Setting CPAP 8.0 cm H20 Nasal Pillow\par F/u Overnight Oximetry\par  Coumadin OFF\par \par NOTED Hold Advair and Change Trelegy 200 mcg 1 puff QD- ? any clinical  benefit \par strong recommended COVID Bi Valent booster

## 2023-03-23 LAB
ALBUMIN SERPL ELPH-MCNC: 4.3 G/DL
ALP BLD-CCNC: 118 U/L
ALT SERPL-CCNC: 19 U/L
ANION GAP SERPL CALC-SCNC: 12 MMOL/L
ANION GAP SERPL CALC-SCNC: 13 MMOL/L
AST SERPL-CCNC: 19 U/L
BASOPHILS # BLD AUTO: 0.03 K/UL
BASOPHILS # BLD AUTO: 0.04 K/UL
BASOPHILS NFR BLD AUTO: 0.6 %
BASOPHILS NFR BLD AUTO: 0.6 %
BILIRUB SERPL-MCNC: 0.3 MG/DL
BUN SERPL-MCNC: 15 MG/DL
BUN SERPL-MCNC: 19 MG/DL
CALCIUM SERPL-MCNC: 10.1 MG/DL
CALCIUM SERPL-MCNC: 9.9 MG/DL
CHLORIDE SERPL-SCNC: 104 MMOL/L
CHLORIDE SERPL-SCNC: 105 MMOL/L
CHOLEST SERPL-MCNC: 251 MG/DL
CK SERPL-CCNC: 66 U/L
CO2 SERPL-SCNC: 25 MMOL/L
CO2 SERPL-SCNC: 25 MMOL/L
CREAT SERPL-MCNC: 0.92 MG/DL
CREAT SERPL-MCNC: 1.04 MG/DL
EGFR: 57 ML/MIN/1.73M2
EGFR: 66 ML/MIN/1.73M2
EOSINOPHIL # BLD AUTO: 0.06 K/UL
EOSINOPHIL # BLD AUTO: 0.09 K/UL
EOSINOPHIL NFR BLD AUTO: 1.1 %
EOSINOPHIL NFR BLD AUTO: 1.4 %
ESTIMATED AVERAGE GLUCOSE: 111 MG/DL
GLUCOSE SERPL-MCNC: 90 MG/DL
GLUCOSE SERPL-MCNC: 93 MG/DL
HBA1C MFR BLD HPLC: 5.5 %
HCT VFR BLD CALC: 41.1 %
HCT VFR BLD CALC: 41.7 %
HDLC SERPL-MCNC: 87 MG/DL
HGB BLD-MCNC: 12.7 G/DL
HGB BLD-MCNC: 13.2 G/DL
IMM GRANULOCYTES NFR BLD AUTO: 0.2 %
IMM GRANULOCYTES NFR BLD AUTO: 0.4 %
LDLC SERPL CALC-MCNC: 144 MG/DL
LDLC SERPL DIRECT ASSAY-MCNC: 142 MG/DL
LYMPHOCYTES # BLD AUTO: 0.59 K/UL
LYMPHOCYTES # BLD AUTO: 0.72 K/UL
LYMPHOCYTES NFR BLD AUTO: 11.1 %
LYMPHOCYTES NFR BLD AUTO: 11.2 %
MAN DIFF?: NORMAL
MAN DIFF?: NORMAL
MCHC RBC-ENTMCNC: 28.6 PG
MCHC RBC-ENTMCNC: 28.8 PG
MCHC RBC-ENTMCNC: 30.9 GM/DL
MCHC RBC-ENTMCNC: 31.7 GM/DL
MCV RBC AUTO: 90.3 FL
MCV RBC AUTO: 93.2 FL
MONOCYTES # BLD AUTO: 0.29 K/UL
MONOCYTES # BLD AUTO: 0.49 K/UL
MONOCYTES NFR BLD AUTO: 5.5 %
MONOCYTES NFR BLD AUTO: 7.6 %
NEUTROPHILS # BLD AUTO: 4.3 K/UL
NEUTROPHILS # BLD AUTO: 5.12 K/UL
NEUTROPHILS NFR BLD AUTO: 79.1 %
NEUTROPHILS NFR BLD AUTO: 81.2 %
NONHDLC SERPL-MCNC: 164 MG/DL
PLATELET # BLD AUTO: 253 K/UL
PLATELET # BLD AUTO: 267 K/UL
POTASSIUM SERPL-SCNC: 3.7 MMOL/L
POTASSIUM SERPL-SCNC: 4 MMOL/L
PROT SERPL-MCNC: 6.6 G/DL
RBC # BLD: 4.41 M/UL
RBC # BLD: 4.62 M/UL
RBC # FLD: 12.8 %
RBC # FLD: 13 %
SODIUM SERPL-SCNC: 141 MMOL/L
SODIUM SERPL-SCNC: 142 MMOL/L
TRIGL SERPL-MCNC: 98 MG/DL
WBC # FLD AUTO: 5.29 K/UL
WBC # FLD AUTO: 6.47 K/UL

## 2023-03-27 ENCOUNTER — NON-APPOINTMENT (OUTPATIENT)
Age: 73
End: 2023-03-27

## 2023-03-27 ENCOUNTER — APPOINTMENT (OUTPATIENT)
Dept: CARDIOLOGY | Facility: CLINIC | Age: 73
End: 2023-03-27
Payer: MEDICARE

## 2023-03-27 VITALS
WEIGHT: 204 LBS | OXYGEN SATURATION: 95 % | HEART RATE: 66 BPM | SYSTOLIC BLOOD PRESSURE: 150 MMHG | DIASTOLIC BLOOD PRESSURE: 80 MMHG | TEMPERATURE: 98.2 F | BODY MASS INDEX: 37.54 KG/M2 | HEIGHT: 62 IN

## 2023-03-27 PROCEDURE — 99214 OFFICE O/P EST MOD 30 MIN: CPT

## 2023-03-27 PROCEDURE — 93000 ELECTROCARDIOGRAM COMPLETE: CPT

## 2023-03-27 NOTE — HISTORY OF PRESENT ILLNESS
[FreeTextEntry1] : This is a 71 y/o female with a pmhx of COPD, PE here today for a follow up.  pt last seen on 3/13 nataliia SOB repeat CTA negative for PE seen by pulm Dr. Browning on [facility]. pt reports on going SOB with and without exertion. however worsens with exertion. PT reports chest pain has resolved.

## 2023-03-30 ENCOUNTER — APPOINTMENT (OUTPATIENT)
Dept: CARDIOLOGY | Facility: CLINIC | Age: 73
End: 2023-03-30
Payer: MEDICARE

## 2023-03-30 PROCEDURE — 93015 CV STRESS TEST SUPVJ I&R: CPT

## 2023-03-30 PROCEDURE — 78452 HT MUSCLE IMAGE SPECT MULT: CPT

## 2023-03-30 PROCEDURE — A9500: CPT

## 2023-03-30 RX ORDER — REGADENOSON 0.08 MG/ML
0.4 INJECTION, SOLUTION INTRAVENOUS
Qty: 1 | Refills: 0 | Status: COMPLETED | OUTPATIENT
Start: 2023-03-30

## 2023-03-30 RX ADMIN — REGADENOSON 5 MG/5ML: 0.08 INJECTION, SOLUTION INTRAVENOUS at 00:00

## 2023-03-31 ENCOUNTER — NON-APPOINTMENT (OUTPATIENT)
Age: 73
End: 2023-03-31

## 2023-04-17 ENCOUNTER — APPOINTMENT (OUTPATIENT)
Dept: PULMONOLOGY | Facility: CLINIC | Age: 73
End: 2023-04-17
Payer: MEDICARE

## 2023-04-17 VITALS — OXYGEN SATURATION: 90 % | DIASTOLIC BLOOD PRESSURE: 80 MMHG | HEART RATE: 62 BPM | SYSTOLIC BLOOD PRESSURE: 142 MMHG

## 2023-04-17 PROCEDURE — 94010 BREATHING CAPACITY TEST: CPT

## 2023-04-17 PROCEDURE — ZZZZZ: CPT

## 2023-04-17 PROCEDURE — 94729 DIFFUSING CAPACITY: CPT

## 2023-04-17 PROCEDURE — 99214 OFFICE O/P EST MOD 30 MIN: CPT | Mod: 25

## 2023-04-17 PROCEDURE — 94727 GAS DIL/WSHOT DETER LNG VOL: CPT

## 2023-04-17 NOTE — HISTORY OF PRESENT ILLNESS
[TextBox_4] : 71-year-old female\par repeat  CT PA per DR Nobles\par Neg PE\par Clear lungs\par \par Post elevated CT PA with pos  PE  recurrent\par  placed on Eliquis protocol\par Remains on TRELEGY with no decline resp sxs\par exertional chest tightness\par Chief complaint cough chest congestion\par Not start any wheezing\par She states home O2 saturations ranging from 92 to 95 96%\par She does have history of pulmonary embolus currently on active therapy with Coumadin\par She is not describing any pleuritic chest pain\par Noted that a review of her prior PT/INR over the past several weeks including September 27, 2022 she was therapeutic with an INR of 2.6\par Pression COPD rule out exacerbation flare\par Pulmonary embolism on active Coumadin (INR therapeutic\par Treatment Z-Munir\par Medrol Dosepak\par \par since last visit required in hot weather required LIAN with chest congestion with relief\par No fever or sputum\par NO pleuritic CP \par Sleep study -severe XENIA\par Pulmonary referral\par Additional information provided at today's visit including a more detailed chart review available with notes provided dating back to 2016 and\par Patient had an orthopedic surgical procedure which was complicated by a postoperative provoked pulmonary embolism\par This by definition and is the second provoked pulmonary embolism post COVID-19 infection\par Complicated pulmonary history\par nocturnal cough\par Chart reviewed\par History COPD \par Patient is status post [facility] May 7, 2022\par COVID positive\par CT scan of the chest demonstrated minimally of acute left lateral 5 through 7 ribs fracture within hematoma of the chest wall post fall on\par Also detected pulmonary embolism of the segmental arteries at the left lower lobe right lower lobe right upper lobe\par Ultrasound Doppler was negative for DVT\par Patient was initially sent home on Eliquis but it was too expensive and switched to Xarelto which patient states was giving her GI symptomatology with diarrhea and subsequently was switched to Coumadin\par Should be noted at today's visit the last INR on July 5, 2022 was 6.9 was advised to hold the Coumadin and then start 2 mg daily after 3 days with repeat evaluation\par Management per primary care physician\par Present is not complaining of any significant left-sided rib pain\par Cough additional complications include patient in a walking boot for Achilles tendinitis\par Also reports a prior history of pneumonia x2 remotely and bronchitis\par Does not describe any recent hospitalizations requiring steroids for COPD\par Has a subacute chronic cough\par Does not describe any wheezing chest congestion chest tightness at rest pleuritic chest pain at present hemoptysis\par No reported purulent sputum\par Patient states she was seen by cardiology reportedly had echocardiogram it was okay I did tell the cardiologist there was a component of exertional chest discomfort\par Patient received the COVID-vaccine Pfizer times 2+ a booster\par Reviewing her additional immunization profile she states flu shots are up-to-date\par She has previously received Prevnar 13 but not PCV 23\par Says has a very minimal tobacco history discontinued all tobacco dating back approximately 50 years\par No reported other history of chemical toxic inhalational exposures\par  [TextBox_19] : Reported positive history obstructive sleep apnea, insomnia

## 2023-04-17 NOTE — CONSULT LETTER
[Please see my note below.] : Please see my note below. [Consult Closing:] : Thank you very much for allowing me to participate in the care of this patient.  If you have any questions, please do not hesitate to contact me. [Sincerely,] : Sincerely, [FreeTextEntry3] : Cholo Browning D.O., CODI\par  of Medicine\par [facility] [employer]\par

## 2023-04-17 NOTE — PROCEDURE
[FreeTextEntry1] : Repeat PFT post resolution of embolic disease on CT angiogram protocol\par April 72,023\par Flow rates are normal\par FEV1 FVC ratio 78\par Lung volumes demonstrate very mild reduction TLC 76% predicted\par No air-trapping\par Moderate reduction diffusion 61% predicted with a loss of functioning alveolar capillary units\par Overall stable pulmonary physiology without further decline\par \par Pulmonary 6-minute walk exercise study February 27 2023\par Indication pulmonary embolism shortness of breath atypical chest pain\par Baseline O2 saturation 97%\par Sigifredo desaturation to 92%\par Patient ambulated with walker only\par No evidence or indication for portable oxygen therapy protocol\par \par PFT feb 9 2023\par  Flow rates taylor nl\par  TLC 74 %\par  DLCO  57 % with mod  loss fx  alveolar capillary units HGB 13.1\par HGB 13.1\par \par CPAP data compliance\par Data unavailable to January 1, 2023\par Affect 100 symptoms 96.7%\par AHI 7.4\par Encouraged increased usage\par \par [place] 12/8/22\par Mild dec FVC\par stable\par \par CT chest November 7, 2022\par CT angiogram\par Indication prior pulmonary embolism\par Comparison study of July 30, 2020  report no filling infection\par Negative pulmonary embolism\par no dissection\par Pulmonary arteries normal caliber\par 3 mm subpleural pulmonary nodule right upper lobe\par No evidence of a CT angiogram findings of a pulmonary embolism with resolution and no residual we will discontinue Coumadin treatment protocol\par \par Sleep study\par August 15 August 72,022\par Severe obstructive sleep apnea\par AHI 33\par Positional component\par Insufficient nonsupine study time\par Present time less than 90% saturation on room air 13.4%\par Greater than 30 dB 63%\par Sigifredo desaturation 77.8%\par Impression severe obstructive sleep apnea\par Address treatment protocol focus primarily on CPAP\par On treatment with CPAP we will then require overnight oximetry to use to evaluate for the noted severe oxygen desaturation also can address an attended titration study in view of the severe oxygen desaturation as an alternative\par \par Pulmonary 6 minute exercise study  8/4/2\par ambulated with walker\par  RA O2 sat 96 % \par No RA desaturation\par \par PFT July 6, 2022\par Mild reduction in flow rates\par FVC 70% predicted\par TLC normal range 82% predicted\par RV/TLC ratio 141% predicted\par Diffusion 79% predicted\par Hemoglobin 13.7\par Impression air trapping\par [place]oth pattern clinical correlation consistent with obstructive sleep apnea rule out noted that this patient has prior data dating back to February 4, 2007 there is some noted decline at the flow rates with stable diffusion and lung volumes\par \par Chest x-ray PA lateral 7/6/22\par Cardiac size normal\par Lung fields clear\par No parenchymal infiltrates pleural effusions or dominant pulmonary nodules\par No pneumothorax\par Pulmonary artery size grossly normal based on plain film

## 2023-04-17 NOTE — DISCUSSION/SUMMARY
[FreeTextEntry1] : Recurrent pulmonary embolism on Eliquis protocol now on 5 mg 1 tablet p.o. twice daily\par Discussed and detail with patient understanding that treatment protocol is long-term lifelong unless there is a impending issues bleeding etc. that require adjustment\par Cardiology follow-up reviewed\par Repeat CT PA per cardiology neg  and clear lungs\par \par CT chest angiogram official report based Feb 2023\par Positive pulmonary embolism\par Subsegmental filling defects right upper lobe right lower lobe left upper lobe new compared to the prior study\par No GI symptom or complaints Eliquis 5 mg twice daily\par Protocol is 10 mg twice daily for the first 7 days of therapy and then 5 mg 1 tablet p.o. twice daily\par \par Also addressed the patient has a recurrent pulmonary embolism and therefore long-term therapy needs to be addressed at which she again is not inclined to want to complete but understands and is in agreement at this time\par \par cardiology ECHO  recommended \par \par COPD \par Pos exertional dyspnea  r/o recurrent PE as etiology vs cardiac ischemic disease which was ruled out with nuclear stress test\par Obesity\par \par Provoked pulmonary embolism dating back to early May 2022 with COVID-19 infection\par Gating issue based on additional historical data and chart review this is the second provoked pulmonary embolism with a prior dating back to 2016 BUT off AC\par Status post chest wall trauma with left rib fractures\par Component of obesity and physical deconditioning contributing to exertional dyspnea\par XENIA on CPAP\par Recommendations\par Issue based on 2 pulmonary embolisms whether patient should receive long-term anticoagulation as opposed to just a 6-month protocol\par Baseline D-dimer nl\par \par \par RESMED CPAP \par Setting CPAP 8.0 cm H20 Nasal Pillow\par F/u Overnight Oximetry\par  Coumadin OFF\par \par NOTED Hold Advair and Change Trelegy 200 mcg 1 puff QD- ? any clinical  benefit \par strong recommended COVID Bi Valent booster

## 2023-04-19 ENCOUNTER — APPOINTMENT (OUTPATIENT)
Dept: FAMILY MEDICINE | Facility: CLINIC | Age: 73
End: 2023-04-19
Payer: MEDICARE

## 2023-04-19 VITALS
TEMPERATURE: 97.8 F | BODY MASS INDEX: 37.43 KG/M2 | HEART RATE: 79 BPM | DIASTOLIC BLOOD PRESSURE: 78 MMHG | OXYGEN SATURATION: 98 % | SYSTOLIC BLOOD PRESSURE: 135 MMHG | WEIGHT: 203.38 LBS | HEIGHT: 62 IN

## 2023-04-19 DIAGNOSIS — Z79.01 ENCOUNTER FOR THERAPEUTIC DRUG LVL MONITORING: ICD-10-CM

## 2023-04-19 DIAGNOSIS — Z51.81 ENCOUNTER FOR THERAPEUTIC DRUG LVL MONITORING: ICD-10-CM

## 2023-04-19 PROCEDURE — 99215 OFFICE O/P EST HI 40 MIN: CPT

## 2023-04-19 RX ORDER — APIXABAN 5 MG/1
5 TABLET, FILM COATED ORAL
Refills: 0 | Status: ACTIVE | COMMUNITY
Start: 2022-05-08

## 2023-04-19 RX ORDER — TRAMADOL HYDROCHLORIDE 50 MG/1
50 TABLET, COATED ORAL
Qty: 14 | Refills: 0 | Status: DISCONTINUED | COMMUNITY
Start: 2022-09-13 | End: 2023-04-19

## 2023-04-19 RX ORDER — DIAZEPAM 2 MG/1
2 TABLET ORAL
Qty: 1 | Refills: 0 | Status: DISCONTINUED | COMMUNITY
Start: 2022-09-13 | End: 2023-04-19

## 2023-04-19 RX ORDER — BUPROPION HYDROCHLORIDE 450 MG/1
450 TABLET, FILM COATED, EXTENDED RELEASE ORAL
Qty: 90 | Refills: 0 | Status: DISCONTINUED | COMMUNITY
Start: 2021-03-02 | End: 2023-04-19

## 2023-04-19 NOTE — DATA REVIEWED
[FreeTextEntry1] : reviewed pulm/cardio notes and pft results\par reviewed ct head-unremarkable, ct angio- nonobstructive CAD, and ct angio- no PE

## 2023-04-19 NOTE — PHYSICAL EXAM
[No Acute Distress] : no acute distress [Normal] : normal rate, regular rhythm, normal S1 and S2 and no murmur heard [Normal Sclera/Conjunctiva] : normal sclera/conjunctiva [EOMI] : extraocular movements intact [Normal Outer Ear/Nose] : the outer ears and nose were normal in appearance [No JVD] : no jugular venous distention [Normal Affect] : the affect was normal [Alert and Oriented x3] : oriented to person, place, and time [Normal Insight/Judgement] : insight and judgment were intact [de-identified] : obese

## 2023-04-19 NOTE — HISTORY OF PRESENT ILLNESS
[FreeTextEntry1] : s/p recurrent PE, now on eliquis, no longer on coumadin; follows w/ pulm and cardio regularly\par takes acetaminophen 1000 mg daily\par c/o b/l knee and rt shoulder pain, surgery has been advised, but states lives alone and has no one to help her\par also c/o inflamed achilles tendon; asking for stronger pain meds\par also wants to cut down on bupropion, stopped buspar, didn't feel she needs them, has taken xanax for anxiety\par states tired of dealing w/ all her health conditions, sometimes feels like she's ready to go\par needs med refills

## 2023-04-19 NOTE — PLAN
[FreeTextEntry1] : consider prednisone to help w/ pain, pt deferring at this time\par increase tramadol to #90 at tid\par consider medical marijuana, schedule w/ [facility]\par cont pulm and cardio f/u\par cont eliquis\par allowed to vent, emotional support provided\par restart buspar, reduce bupropion to 300 mg at pt's request\par f/u w/ ortho, consider surgery w/ consequent acute/subacute rehab to assist w/ returning to independent function, pt will think about it as she has her dog to care for\par

## 2023-04-20 ENCOUNTER — APPOINTMENT (OUTPATIENT)
Dept: ORTHOPEDIC SURGERY | Facility: CLINIC | Age: 73
End: 2023-04-20
Payer: MEDICARE

## 2023-04-20 VITALS — HEIGHT: 62 IN | WEIGHT: 203 LBS | BODY MASS INDEX: 37.36 KG/M2

## 2023-04-20 PROCEDURE — 99214 OFFICE O/P EST MOD 30 MIN: CPT | Mod: 25

## 2023-04-20 PROCEDURE — J3490M: CUSTOM | Mod: NC

## 2023-04-20 PROCEDURE — 20610 DRAIN/INJ JOINT/BURSA W/O US: CPT | Mod: RT

## 2023-04-20 NOTE — ASSESSMENT
[FreeTextEntry1] : right knee pain with history of oa. pain worse.  csi 8/16/22 with temp relief. \par left knee pain. mri 10 2018 shows mmt and oa.   csi 8/16/22 with temp relief. \par right shoulder pain. mri shows small full thickness cuff tear.  discussed options with patient.  defers surgery now but aware of risks.  csi on 9/22/22 with temp relief. \par left shoulder pain. history of left shoulder cuff repair on 2/26/2016 done by me.\par \par pmhx: depression, anxiety, copd, htn, now on blood thinners for PE.\par history of dvt LUE postop.\par recently had PE in feb 2023 - currently on eliquis\par \par patient gets tramadol from pcp for chronic pain  - discussed pain management consult with patient.

## 2023-04-20 NOTE — HISTORY OF PRESENT ILLNESS
[7] : 7 [2] : 2 [Burning] : burning [Dull/Aching] : dull/aching [Localized] : localized [Sharp] : sharp [Shooting] : shooting [Stabbing] : stabbing [Throbbing] : throbbing [Intermittent] : intermittent [de-identified] : 8/16/22: right shoulder and bilateral knee pain back again.\par 9/13/22:  temp improvement with csi bilateral knees.  right shoulder pain still. \par 9/29/22:  bilateral knee pain. \par 1/24/23:  right shoulder pain. \par 4/20/23:  right shoulder pain persists.  [] : no [FreeTextEntry1] : right shoulder [de-identified] : none

## 2023-04-20 NOTE — PROCEDURE
[FreeTextEntry3] : Procedure Name: Large Joint Injection / Aspiration: Celestone, Lidocaine and Marcaine with Ultrasound Evaluation and Guidance\par \par Large Joint Injection was performed because of pain and inflammation. Anesthesia: ethyl chloride sprayed topically.\par Celestone: An injection of Celestone 6 mg , 1 cc.\par Lidocaine 1%: 3 cc.\par Marcaine 0.25%:  3 cc.\par \par Patient has tried OTC's including aspirin, Ibuprofen, Aleve etc or prescription NSAIDS, and/or exercises at home and/ or physical therapy without satisfactory response and Patient has decreased mobility in the joint. The risks, benefits, and alternatives to cortisone injection were explained in full to the patient. Risks outlined include but are not limited to infection, sepsis, bleeding, scarring, skin discoloration, temporary increase in pain, syncopal episode, failure to resolve symptoms, allergic reaction, symptom recurrence, and elevation of blood sugar in diabetics. Patient understood the risks. All questions were answered.   Oral informed consent was obtained.   Sterile technique was utilized for the procedure including the preparation of the solutions used for the injection. Medication was injected into RIGHT SUBACROMIAL SPACE   Patient tolerated the procedure well. Advised to ice the injection site this evening.  Post Procedure Instructions: Patient was advised to call if redness, pain, or fever occur and apply ice for 15 min. out of every hour for the next 12-24 hours as tolerated. patient was advised to rest the joint(s) for 2 days. \par \par Ultrasound Extremity (99628) was used because of the following reasons: inflammation.\par Ultrasound Guidance was used for the following reasons: for accurate placement of needle into SUBACROMIAL SPACE.\par Diagnostic ultrasound was performed of the SUBACROMIAL SPACE and is positive for synovitis.\par Ultrasound guided injection was performed of the subacromial space, visualization of the needle and placement of injection was performed without complication.

## 2023-04-20 NOTE — PHYSICAL EXAM
[4 ___] : forward flexion 4[unfilled]/5 [Right] : right knee [Left] : left knee [NL (0)] : extension 0 degrees [4___] : quadriceps 4[unfilled]/5 [5___] : hamstring 5[unfilled]/5 [TWNoteComboBox6] : internal rotation L5 [de-identified] : external rotation 25 degrees [] : no guarding during exam [TWNoteComboBox7] : flexion 120 degrees

## 2023-04-20 NOTE — DISCUSSION/SUMMARY
[de-identified] : Progress note completed by Denise Clark PA-C\par *Dr. Diaz - The MANUEL assigned on this date saw this patient independently.  I have reviewed the note and agree with the treatment provided.

## 2023-05-09 ENCOUNTER — NON-APPOINTMENT (OUTPATIENT)
Age: 73
End: 2023-05-09

## 2023-05-15 ENCOUNTER — APPOINTMENT (OUTPATIENT)
Dept: PULMONOLOGY | Facility: CLINIC | Age: 73
End: 2023-05-15
Payer: MEDICARE

## 2023-05-15 VITALS
BODY MASS INDEX: 38.09 KG/M2 | SYSTOLIC BLOOD PRESSURE: 157 MMHG | HEIGHT: 62 IN | WEIGHT: 207 LBS | DIASTOLIC BLOOD PRESSURE: 81 MMHG | HEART RATE: 61 BPM | OXYGEN SATURATION: 92 %

## 2023-05-15 PROCEDURE — 99214 OFFICE O/P EST MOD 30 MIN: CPT

## 2023-05-15 PROCEDURE — 94762 N-INVAS EAR/PLS OXIMTRY CONT: CPT

## 2023-05-15 NOTE — PROCEDURE
[FreeTextEntry1] : Repeat PFT post resolution of embolic disease on CT angiogram protocol\par April 4/17/23\par Flow rates are normal\par FEV1 FVC ratio 78\par Lung volumes demonstrate very mild reduction TLC 76% predicted\par No air-trapping\par Moderate reduction diffusion 61% predicted with a loss of functioning alveolar capillary units\par Overall stable pulmonary physiology without further decline\par \par Pulmonary 6-minute walk exercise study February 27 2023\par Indication pulmonary embolism shortness of breath atypical chest pain\par Baseline O2 saturation 97%\par Sigifredo desaturation to 92%\par Patient ambulated with walker only\par No evidence or indication for portable oxygen therapy protocol\par \par PFT feb 9 2023\par  Flow rates taylor nl\par  TLC 74 %\par  DLCO  57 % with mod  loss fx  alveolar capillary units HGB 13.1\par HGB 13.1\par \par CPAP data compliance\par Data unavailable to January 1, 2023\par Affect 100 symptoms 96.7%\par AHI 7.4\par Encouraged increased usage\par \par Taylor 12/8/22\par Mild dec FVC\par stable\par \par CT chest November 7, 2022\par CT angiogram\par Indication prior pulmonary embolism\par Comparison study of July 30, 2020  report no filling infection\par Negative pulmonary embolism\par no dissection\par Pulmonary arteries normal caliber\par 3 mm subpleural pulmonary nodule right upper lobe\par No evidence of a CT angiogram findings of a pulmonary embolism with resolution and no residual we will discontinue Coumadin treatment protocol\par \par Sleep study\par August 15 August 72,022\par Severe obstructive sleep apnea\par AHI 33\par Positional component\par Insufficient nonsupine study time\par Present time less than 90% saturation on room air 13.4%\par Greater than 30 dB 63%\par Sigifredo desaturation 77.8%\par Impression severe obstructive sleep apnea\par Address treatment protocol focus primarily on CPAP\par On treatment with CPAP we will then require overnight oximetry to use to evaluate for the noted severe oxygen desaturation also can address an attended titration study in view of the severe oxygen desaturation as an alternative\par \par Pulmonary 6 minute exercise study  8/4/2\par ambulated with walker\par  RA O2 sat 96 % \par No RA desaturation\par \par PFT July 6, 2022\par Mild reduction in flow rates\par FVC 70% predicted\par TLC normal range 82% predicted\par RV/TLC ratio 141% predicted\par Diffusion 79% predicted\par Hemoglobin 13.7\par Impression air trapping\par Sawtooth pattern clinical correlation consistent with obstructive sleep apnea rule out noted that this patient has prior data dating back to February 4, 2007 there is some noted decline at the flow rates with stable diffusion and lung volumes\par \par Chest x-ray PA lateral 7/6/22\par Cardiac size normal\par Lung fields clear\par No parenchymal infiltrates pleural effusions or dominant pulmonary nodules\par No pneumothorax\par Pulmonary artery size grossly normal based on plain film

## 2023-05-15 NOTE — HISTORY OF PRESENT ILLNESS
[Former] : former [TextBox_4] : 71-year-old female\par repeat  CT PA per DR Nobles\par Neg PE\par Clear lungs\par \par Post elevated CT PA with pos  PE  recurrent\par  placed on Eliquis protocol\par Remains on TRELEGY with no decline resp sxs\par exertional chest tightness\par Chief complaint cough chest congestion\par Not start any wheezing\par She states home O2 saturations ranging from 92 to 95 96%\par She does have history of pulmonary embolus currently on active therapy with Coumadin\par She is not describing any pleuritic chest pain\par Noted that a review of her prior PT/INR over the past several weeks including September 27, 2022 she was therapeutic with an INR of 2.6\par Pression COPD rule out exacerbation flare\par Pulmonary embolism on active Coumadin (INR therapeutic\par Treatment Z-Munir\par Medrol Dosepak\par \par since last visit required in hot weather required LIAN with chest congestion with relief\par No fever or sputum\par NO pleuritic CP \par Sleep study -severe XENIA\par Pulmonary referral\par Additional information provided at today's visit including a more detailed chart review available with notes provided dating back to 2016 and\par Patient had an orthopedic surgical procedure which was complicated by a postoperative provoked pulmonary embolism\par This by definition and is the second provoked pulmonary embolism post COVID-19 infection\par Complicated pulmonary history\par nocturnal cough\par Chart reviewed\par History COPD \par Patient is status post [facility] May 7, 2022\par COVID positive\par CT scan of the chest demonstrated minimally of acute left lateral 5 through 7 ribs fracture within hematoma of the chest wall post fall on\par Also detected pulmonary embolism of the segmental arteries at the left lower lobe right lower lobe right upper lobe\par Ultrasound Doppler was negative for DVT\par Patient was initially sent home on Eliquis but it was too expensive and switched to Xarelto which patient states was giving her GI symptomatology with diarrhea and subsequently was switched to Coumadin\par Should be noted at today's visit the last INR on July 5, 2022 was 6.9 was advised to hold the Coumadin and then start 2 mg daily after 3 days with repeat evaluation\par Management per primary care physician\par Present is not complaining of any significant left-sided rib pain\par Cough additional complications include patient in a walking boot for Achilles tendinitis\par Also reports a prior history of pneumonia x2 remotely and bronchitis\par Does not describe any recent hospitalizations requiring steroids for COPD\par Has a subacute chronic cough\par Does not describe any wheezing chest congestion chest tightness at rest pleuritic chest pain at present hemoptysis\par No reported purulent sputum\par Patient states she was seen by cardiology reportedly had echocardiogram it was okay I did tell the cardiologist there was a component of exertional chest discomfort\par Patient received the COVID-vaccine Pfizer times 2+ a booster\par Reviewing her additional immunization profile she states flu shots are up-to-date\par She has previously received Prevnar 13 but not PCV 23\par Says has a very minimal tobacco history discontinued all tobacco dating back approximately 50 years\par No reported other history of chemical toxic inhalational exposures\par  [TextBox_19] : Reported positive history obstructive sleep apnea, insomnia

## 2023-05-15 NOTE — DISCUSSION/SUMMARY
[FreeTextEntry1] : Recurrent pulmonary embolism on Eliquis protocol now on 5 mg 1 tablet p.o. twice daily\par Discussed and detail with patient understanding that treatment protocol is long-term lifelong unless there is a impending issues bleeding etc. that require adjustment\par Cardiology follow-up reviewed\par Repeat CT PA per cardiology neg  and clear lungs\par Physical deconditioning\par  Obesity\par \par CT chest angiogram official report based Feb 2023\par Positive pulmonary embolism\par Subsegmental filling defects right upper lobe right lower lobe left upper lobe new compared to the prior study\par No GI symptom or complaints Eliquis 5 mg twice daily\par Protocol is 10 mg twice daily for the first 7 days of therapy and then 5 mg 1 tablet p.o. twice daily\par \par Also addressed the patient has a recurrent pulmonary embolism and therefore long-term therapy needs to be addressed at which she again is not inclined to want to complete but understands and is in agreement at this time\par \par cardiology ECHO  recommended \par \par COPD \par Pos exertional dyspnea  r/o recurrent PE as etiology vs cardiac ischemic disease which was ruled out with nuclear stress test\par Obesity\par \par Provoked pulmonary embolism dating back to early May 2022 with COVID-19 infection\par Gating issue based on additional historical data and chart review this is the second provoked pulmonary embolism with a prior dating back to 2016 BUT off AC\par Status post chest wall trauma with left rib fractures\par Component of obesity and physical deconditioning contributing to exertional dyspnea\par XENIA on CPAP\par Recommendations\par Long term AC NOAC sec  multiple PE\par Baseline D-dimer nl\par \par \par RESMED CPAP \par Setting CPAP 8.0 cm H20 Nasal Pillow\par F/u Overnight Oximetry discussed\par  Coumadin OFF\par \par NOTED Hold Advair and Change Trelegy 200 mcg 1 puff QD- ? any clinical  benefit \par strong recommended COVID Bi Valent booster

## 2023-05-15 NOTE — CONSULT LETTER
[Please see my note below.] : Please see my note below. [Consult Closing:] : Thank you very much for allowing me to participate in the care of this patient.  If you have any questions, please do not hesitate to contact me. [Sincerely,] : Sincerely, [FreeTextEntry3] : Cholo Browning D.O., CODI\par  of Medicine\par [facility] [employer]\par

## 2023-05-16 ENCOUNTER — APPOINTMENT (OUTPATIENT)
Dept: CARDIOLOGY | Facility: CLINIC | Age: 73
End: 2023-05-16
Payer: MEDICARE

## 2023-05-16 ENCOUNTER — NON-APPOINTMENT (OUTPATIENT)
Age: 73
End: 2023-05-16

## 2023-05-16 VITALS
TEMPERATURE: 98.1 F | HEIGHT: 62 IN | HEART RATE: 67 BPM | BODY MASS INDEX: 38.28 KG/M2 | WEIGHT: 208 LBS | DIASTOLIC BLOOD PRESSURE: 64 MMHG | OXYGEN SATURATION: 94 % | SYSTOLIC BLOOD PRESSURE: 130 MMHG

## 2023-05-16 DIAGNOSIS — R06.02 SHORTNESS OF BREATH: ICD-10-CM

## 2023-05-16 DIAGNOSIS — I25.10 ATHEROSCLEROTIC HEART DISEASE OF NATIVE CORONARY ARTERY W/OUT ANGINA PECTORIS: ICD-10-CM

## 2023-05-16 DIAGNOSIS — Z12.12 ENCOUNTER FOR SCREENING FOR MALIGNANT NEOPLASM OF COLON: ICD-10-CM

## 2023-05-16 DIAGNOSIS — R60.0 LOCALIZED EDEMA: ICD-10-CM

## 2023-05-16 DIAGNOSIS — D64.9 ANEMIA, UNSPECIFIED: ICD-10-CM

## 2023-05-16 DIAGNOSIS — E78.00 PURE HYPERCHOLESTEROLEMIA, UNSPECIFIED: ICD-10-CM

## 2023-05-16 DIAGNOSIS — R07.89 OTHER CHEST PAIN: ICD-10-CM

## 2023-05-16 DIAGNOSIS — Z12.11 ENCOUNTER FOR SCREENING FOR MALIGNANT NEOPLASM OF COLON: ICD-10-CM

## 2023-05-16 DIAGNOSIS — R42 DIZZINESS AND GIDDINESS: ICD-10-CM

## 2023-05-16 PROCEDURE — 99214 OFFICE O/P EST MOD 30 MIN: CPT

## 2023-05-16 PROCEDURE — 93970 EXTREMITY STUDY: CPT

## 2023-05-16 PROCEDURE — 93000 ELECTROCARDIOGRAM COMPLETE: CPT

## 2023-05-16 NOTE — REVIEW OF SYSTEMS
[Negative] : Heme/Lymph [Lower Ext Edema] : lower extremity edema [SOB] : no shortness of breath [Dyspnea on exertion] : not dyspnea during exertion [Chest Discomfort] : no chest discomfort [Leg Claudication] : no intermittent leg claudication [Palpitations] : no palpitations [Orthopnea] : no orthopnea [PND] : no PND [Syncope] : no syncope

## 2023-05-16 NOTE — PHYSICAL EXAM

## 2023-05-16 NOTE — HISTORY OF PRESENT ILLNESS
[FreeTextEntry1] : This is a 71 y/o female with a pmhx of COPD, CAD, recurrent PE on Eliquis here today for a follow up. Pt is s/p CTCA 3/30/23 with non obstructive CAD and lexiscan 3/30/23 with no evidence of ischemia. Patient states chest pain has resolved. she reports her SOB at improved. Pt still reports worsening bruises on arms. Pt also reports b/L LE edema, worse on the left.\par Patient denies chest pain, dyspnea, palpitations, dizziness, syncope, changes in bowel/bladder habits or appetite. \par

## 2023-05-18 ENCOUNTER — NON-APPOINTMENT (OUTPATIENT)
Age: 73
End: 2023-05-18

## 2023-05-23 ENCOUNTER — APPOINTMENT (OUTPATIENT)
Dept: ORTHOPEDIC SURGERY | Facility: CLINIC | Age: 73
End: 2023-05-23
Payer: MEDICARE

## 2023-05-23 VITALS — HEIGHT: 62 IN | BODY MASS INDEX: 38.28 KG/M2 | WEIGHT: 208 LBS

## 2023-05-23 DIAGNOSIS — M75.101 UNSPECIFIED ROTATOR CUFF TEAR OR RUPTURE OF RIGHT SHOULDER, NOT SPECIFIED AS TRAUMATIC: ICD-10-CM

## 2023-05-23 PROCEDURE — 20610 DRAIN/INJ JOINT/BURSA W/O US: CPT | Mod: LT

## 2023-05-23 PROCEDURE — 73562 X-RAY EXAM OF KNEE 3: CPT | Mod: 50

## 2023-05-23 PROCEDURE — 99214 OFFICE O/P EST MOD 30 MIN: CPT | Mod: 25

## 2023-05-23 NOTE — ASSESSMENT
[FreeTextEntry1] : right knee pain with history of oa. pain worse.\par \par left knee pain. mri 10 2018 shows mmt and oa.  history of knee bursectomy.\par   \par right shoulder pain. mri shows small full thickness cuff tear.  csi 4/20/23.\par \par left shoulder pain. history of left shoulder cuff repair on 2/26/2016 done by me.\par \par pmhx: depression, anxiety, copd, htn, now on blood thinners for PE.\par history of dvt LUE postop.\par recently had PE in feb 2023 - currently on eliquis\par \par patient gets tramadol from pcp for chronic pain  - discussed pain management consult with patient.

## 2023-05-23 NOTE — PHYSICAL EXAM
[4 ___] : forward flexion 4[unfilled]/5 [NL (0)] : extension 0 degrees [4___] : quadriceps 4[unfilled]/5 [5___] : hamstring 5[unfilled]/5 [Left] : left knee [Right] : right knee [Degenerative change] : Degenerative change [TWNoteComboBox6] : internal rotation L5 [de-identified] : external rotation 25 degrees [] : no guarding during exam [TWNoteComboBox7] : flexion 95 degrees

## 2023-05-23 NOTE — HISTORY OF PRESENT ILLNESS
[Stabbing] : stabbing [7] : 7 [2] : 2 [Burning] : burning [Dull/Aching] : dull/aching [Localized] : localized [Sharp] : sharp [Shooting] : shooting [Throbbing] : throbbing [Intermittent] : intermittent [de-identified] : 8/16/22: right shoulder and bilateral knee pain back again.\par 9/13/22:  temp improvement with csi bilateral knees.  right shoulder pain still. \par 9/29/22:  bilateral knee pain. \par 1/24/23:  right shoulder pain. \par 4/20/23:  right shoulder pain persists. \par 5/23/23: recurrent bilateral knee pain. right shoulder improving. [] : Post Surgical Visit: no [FreeTextEntry1] : right shoulder [de-identified] : none

## 2023-05-24 ENCOUNTER — APPOINTMENT (OUTPATIENT)
Dept: SURGERY | Facility: CLINIC | Age: 73
End: 2023-05-24
Payer: MEDICARE

## 2023-05-24 VITALS
WEIGHT: 207 LBS | DIASTOLIC BLOOD PRESSURE: 90 MMHG | HEART RATE: 66 BPM | TEMPERATURE: 96.3 F | BODY MASS INDEX: 38.09 KG/M2 | HEIGHT: 62 IN | OXYGEN SATURATION: 96 % | SYSTOLIC BLOOD PRESSURE: 164 MMHG

## 2023-05-24 PROCEDURE — 99203 OFFICE O/P NEW LOW 30 MIN: CPT

## 2023-06-01 ENCOUNTER — APPOINTMENT (OUTPATIENT)
Dept: ORTHOPEDIC SURGERY | Facility: CLINIC | Age: 73
End: 2023-06-01

## 2023-06-02 ENCOUNTER — APPOINTMENT (OUTPATIENT)
Dept: FAMILY MEDICINE | Facility: CLINIC | Age: 73
End: 2023-06-02

## 2023-06-02 ENCOUNTER — APPOINTMENT (OUTPATIENT)
Dept: PULMONOLOGY | Facility: CLINIC | Age: 73
End: 2023-06-02
Payer: MEDICARE

## 2023-06-02 VITALS — OXYGEN SATURATION: 96 % | SYSTOLIC BLOOD PRESSURE: 129 MMHG | HEART RATE: 61 BPM | DIASTOLIC BLOOD PRESSURE: 73 MMHG

## 2023-06-02 DIAGNOSIS — Z01.811 ENCOUNTER FOR PREPROCEDURAL RESPIRATORY EXAMINATION: ICD-10-CM

## 2023-06-02 PROCEDURE — 94010 BREATHING CAPACITY TEST: CPT

## 2023-06-02 PROCEDURE — 99214 OFFICE O/P EST MOD 30 MIN: CPT | Mod: 25

## 2023-06-02 NOTE — CONSULT LETTER
[Please see my note below.] : Please see my note below. [Consult Closing:] : Thank you very much for allowing me to participate in the care of this patient.  If you have any questions, please do not hesitate to contact me. [Sincerely,] : Sincerely, [FreeTextEntry3] : Cholo Browning D.O., CODI\par  of Medicine\par [facility] [employer]\par

## 2023-06-02 NOTE — DISCUSSION/SUMMARY
[FreeTextEntry1] : Recurrent pulmonary embolism on Eliquis protocol now on 5 mg 1 tablet p.o. twice daily\par Discussed and detail with patient understanding that treatment protocol is long-term lifelong unless there is a impending issues bleeding etc. that require adjustment\par Cardiology follow-up reviewed\par Repeat CT PA per cardiology neg  and clear lungs\par Physical deconditioning\par  Obesity\par Is on CPAP\par Demonstrated desaturation on CPAP\par Still tired during the day\par Discussed with her the indication and clinical benefit for O2 2 L bleeding a with \par \par CT chest angiogram official report based Feb 2023\par Positive pulmonary embolism\par Subsegmental filling defects right upper lobe right lower lobe left upper lobe new compared to the prior study\par No GI symptom or complaints Eliquis 5 mg twice daily\par Protocol is 10 mg twice daily for the first 7 days of therapy and then 5 mg 1 tablet p.o. twice daily\par \par Also addressed the patient has a recurrent pulmonary embolism and therefore long-term therapy needs to be addressed at which she again is not inclined to want to complete but understands and is in agreement at this time\par \par cardiology ECHO  recommended \par \par COPD \par Pos exertional dyspnea  r/o recurrent PE as etiology vs cardiac ischemic disease which was ruled out with nuclear stress test\par Obesity\par \par Provoked pulmonary embolism dating back to early May 2022 with COVID-19 infection\par Gating issue based on additional historical data and chart review this is the second provoked pulmonary embolism with a prior dating back to 2016 BUT off AC\par Status post chest wall trauma with left rib fractures\par Component of obesity and physical deconditioning contributing to exertional dyspnea\par XENIA on CPAP\par Recommendations\par Long term AC NOAC sec  multiple PE\par Baseline D-dimer nl\par \par RESMED CPAP \par Setting CPAP 8.0 cm H20 Nasal Pillow\par F/u Overnight Oximetry discussed\par  Coumadin OFF\par \par NOTED Hold Advair and Change Trelegy 200 mcg 1 puff QD- ? any clinical  benefit \par strong recommended COVID Bi Valent booster\par \par Patient is cleared to undergo scheduled endoscopy\par Because of recurrent PE and long-term Eliquis will transition to Lovenox for procedure\par Procedure scheduled for 8/12/2023\par We will hold Eliquis on June 7, 2023\par Start Lovenox 100 mcg subcutaneous twice daily x3 days discontinue all injections on June 11 and for scheduled repeat procedure June 12\par If no complications transition back to Eliquis post procedure

## 2023-06-02 NOTE — PROCEDURE
[FreeTextEntry1] : TAYLOR June 2 2023\par normal flow rates  no evidence hypercarbia\par \par Repeat PFT post resolution of embolic disease on CT angiogram protocol\par April 4/17/23\par Flow rates are normal\par FEV1 FVC ratio 78\par Lung volumes demonstrate very mild reduction TLC 76% predicted\par No air-trapping\par Moderate reduction diffusion 61% predicted with a loss of functioning alveolar capillary units\par Overall stable pulmonary physiology without further decline\par \par Pulmonary 6-minute walk exercise study February 27 2023\par Indication pulmonary embolism shortness of breath atypical chest pain\par Baseline O2 saturation 97%\par Sigifredo desaturation to 92%\par Patient ambulated with walker only\par No evidence or indication for portable oxygen therapy protocol\par \par PFT feb 9 2023\par  Flow rates taylor nl\par  TLC 74 %\par  DLCO  57 % with mod  loss fx  alveolar capillary units HGB 13.1\par HGB 13.1\par \par CPAP data compliance\par Data unavailable to January 1, 2023\par Affect 100 symptoms 96.7%\par AHI 7.4\par Encouraged increased usage\par \par Taylor 12/8/22\par Mild dec FVC\par stable\par \par CT chest November 7, 2022\par CT angiogram\par Indication prior pulmonary embolism\par Comparison study of July 30, 2020  report no filling infection\par Negative pulmonary embolism\par no dissection\par Pulmonary arteries normal caliber\par 3 mm subpleural pulmonary nodule right upper lobe\par No evidence of a CT angiogram findings of a pulmonary embolism with resolution and no residual we will discontinue Coumadin treatment protocol\par \par Sleep study\par August 15 August 72,022\par Severe obstructive sleep apnea\par AHI 33\par Positional component\par Insufficient nonsupine study time\par Present time less than 90% saturation on room air 13.4%\par Greater than 30 dB 63%\par Sigifredo desaturation 77.8%\par Impression severe obstructive sleep apnea\par Address treatment protocol focus primarily on CPAP\par On treatment with CPAP we will then require overnight oximetry to use to evaluate for the noted severe oxygen desaturation also can address an attended titration study in view of the severe oxygen desaturation as an alternative\par \par Pulmonary 6 minute exercise study  8/4/2\par ambulated with walker\par  RA O2 sat 96 % \par No RA desaturation\par \par PFT July 6, 2022\par Mild reduction in flow rates\par FVC 70% predicted\par TLC normal range 82% predicted\par RV/TLC ratio 141% predicted\par Diffusion 79% predicted\par Hemoglobin 13.7\par Impression air trapping\par Sawtooth pattern clinical correlation consistent with obstructive sleep apnea rule out noted that this patient has prior data dating back to February 4, 2007 there is some noted decline at the flow rates with stable diffusion and lung volumes\par \par Chest x-ray PA lateral 7/6/22\par Cardiac size normal\par Lung fields clear\par No parenchymal infiltrates pleural effusions or dominant pulmonary nodules\par No pneumothorax\par Pulmonary artery size grossly normal based on plain film

## 2023-06-02 NOTE — HISTORY OF PRESENT ILLNESS
[Former] : former [TextBox_4] : 71-year-old female\par repeat  CT PA per DR Nobles\par Neg PE\par Clear lungs\par NOTED scheduled Colonoscopy -issue Eliquis \par \par Is on CPAP\par Demonstrated desaturation on CPAP\par Still tired during the day\par Discussed with her the indication and clinical benefit for O2 2 L bleeding in CPAP\par set up\par \par Post elevated CT PA with pos  PE  recurrent\par  placed on Eliquis protocol\par Remains on TRELEGY with no decline resp sxs\par exertional chest tightness\par Chief complaint cough chest congestion\par Not start any wheezing\par She states home O2 saturations ranging from 92 to 95 96%\par She does have history of pulmonary embolus currently on active therapy with Coumadin\par She is not describing any pleuritic chest pain\par Noted that a review of her prior PT/INR over the past several weeks including September 27, 2022 she was therapeutic with an INR of 2.6\par Pression COPD rule out exacerbation flare\par Pulmonary embolism on active Coumadin (INR therapeutic\par Treatment Z-Munir\par Medrol Dosepak\par \par since last visit required in hot weather required LIAN with chest congestion with relief\par No fever or sputum\par NO pleuritic CP \par Sleep study -severe XENIA\par Pulmonary referral\par Additional information provided at today's visit including a more detailed chart review available with notes provided dating back to 2016 and\par Patient had an orthopedic surgical procedure which was complicated by a postoperative provoked pulmonary embolism\par This by definition and is the second provoked pulmonary embolism post COVID-19 infection\par Complicated pulmonary history\par nocturnal cough\par Chart reviewed\par History COPD \par Patient is status post [facility] May 7, 2022\par COVID positive\par CT scan of the chest demonstrated minimally of acute left lateral 5 through 7 ribs fracture within hematoma of the chest wall post fall on\par Also detected pulmonary embolism of the segmental arteries at the left lower lobe right lower lobe right upper lobe\par Ultrasound Doppler was negative for DVT\par Patient was initially sent home on Eliquis but it was too expensive and switched to Xarelto which patient states was giving her GI symptomatology with diarrhea and subsequently was switched to Coumadin\par Should be noted at today's visit the last INR on July 5, 2022 was 6.9 was advised to hold the Coumadin and then start 2 mg daily after 3 days with repeat evaluation\par Management per primary care physician\par Present is not complaining of any significant left-sided rib pain\par Cough additional complications include patient in a walking boot for Achilles tendinitis\par Also reports a prior history of pneumonia x2 remotely and bronchitis\par Does not describe any recent hospitalizations requiring steroids for COPD\par Has a subacute chronic cough\par Does not describe any wheezing chest congestion chest tightness at rest pleuritic chest pain at present hemoptysis\par No reported purulent sputum\par Patient states she was seen by cardiology reportedly had echocardiogram it was okay I did tell the cardiologist there was a component of exertional chest discomfort\par Patient received the COVID-vaccine Pfizer times 2+ a booster\par Reviewing her additional immunization profile she states flu shots are up-to-date\par She has previously received Prevnar 13 but not PCV 23\par Says has a very minimal tobacco history discontinued all tobacco dating back approximately 50 years\par No reported other history of chemical toxic inhalational exposures\par  [TextBox_19] : Reported positive history obstructive sleep apnea, insomnia

## 2023-06-07 ENCOUNTER — RX RENEWAL (OUTPATIENT)
Age: 73
End: 2023-06-07

## 2023-06-09 RX ORDER — ALBUTEROL SULFATE 90 UG/1
108 (90 BASE) INHALANT RESPIRATORY (INHALATION)
Qty: 1 | Refills: 1 | Status: ACTIVE | COMMUNITY
Start: 2023-06-09 | End: 1900-01-01

## 2023-06-12 ENCOUNTER — APPOINTMENT (OUTPATIENT)
Dept: SURGERY | Facility: CLINIC | Age: 73
End: 2023-06-12

## 2023-06-20 ENCOUNTER — RX RENEWAL (OUTPATIENT)
Age: 73
End: 2023-06-20

## 2023-06-30 ENCOUNTER — RX RENEWAL (OUTPATIENT)
Age: 73
End: 2023-06-30

## 2023-07-03 ENCOUNTER — APPOINTMENT (OUTPATIENT)
Dept: PULMONOLOGY | Facility: CLINIC | Age: 73
End: 2023-07-03
Payer: MEDICARE

## 2023-07-03 VITALS — HEART RATE: 63 BPM | OXYGEN SATURATION: 94 % | DIASTOLIC BLOOD PRESSURE: 87 MMHG | SYSTOLIC BLOOD PRESSURE: 131 MMHG

## 2023-07-03 PROCEDURE — 99214 OFFICE O/P EST MOD 30 MIN: CPT

## 2023-07-03 NOTE — CONSULT LETTER
[Please see my note below.] : Please see my note below. [Consult Closing:] : Thank you very much for allowing me to participate in the care of this patient.  If you have any questions, please do not hesitate to contact me. [Sincerely,] : Sincerely, [FreeTextEntry3] : Cholo Browning D.O., CODI\par  of Medicine\par [facility] [employer]\par

## 2023-07-03 NOTE — DISCUSSION/SUMMARY
[FreeTextEntry1] : Recurrent pulmonary embolism on Eliquis protocol now on 5 mg 1 tablet p.o. twice daily\par Discussed and detail with patient understanding that treatment protocol is long-term lifelong unless there is a impending issues bleeding etc. that require adjustment\par Cardiology follow-up reviewed\par Repeat CT PA per cardiology neg  and clear lungs\par Physical deconditioning\par  Obesity\par Is on CPAP\par Demonstrated desaturation on CPAP\par Still tired during the day\par Discussed with her the indication and clinical benefit for O2 2 L bleeding with CPAP\par \par CT chest angiogram official report based Feb 2023\par Positive pulmonary embolism\par Subsegmental filling defects right upper lobe right lower lobe left upper lobe new compared to the prior study\par No GI symptom or complaints Eliquis 5 mg twice daily\par Protocol is 10 mg twice daily for the first 7 days of therapy and then 5 mg 1 tablet p.o. twice daily\par \par Also addressed the patient has a recurrent pulmonary embolism and therefore long-term therapy needs to be addressed at which she again is not inclined to want to complete but understands and is in agreement at this time\par \par cardiology ECHO  recommended \par \par COPD \par Pos exertional dyspnea  r/o recurrent PE as etiology vs cardiac ischemic disease which was ruled out with nuclear stress test\par Obesity\par \par Provoked pulmonary embolism dating back to early May 2022 with COVID-19 infection\par Gating issue based on additional historical data and chart review this is the second provoked pulmonary embolism with a prior dating back to 2016 BUT off AC\par Status post chest wall trauma with left rib fractures\par Component of obesity and physical deconditioning contributing to exertional dyspnea\par XENIA on CPAP\par Recommendations\par Long term AC NOAC sec  multiple PE\par Baseline D-dimer nl\par \par RESMED CPAP \par Setting CPAP 8.0 cm H20 Nasal Pillow\par F/u Overnight Oximetry discussed Bleed O2  2 liters/minute\par  Coumadin OFF\par \par NOTED Hold Advair and Change Trelegy 200 mcg 1 puff QD- ? any clinical  benefit \par strong recommended COVID Bi Valent booster\par \par Patient is cleared to undergo scheduled endoscopy\par Because of recurrent PE and long-term Eliquis will transition to Lovenox for procedure\par Procedure scheduled for 8/12/2023\par We will hold Eliquis on July 7/7-7/8, 2023\par Start Lovenox 100 mcg subcutaneous twice daily x3 days discontinue all injections by July 9 and for scheduled repeat procedure  July 10 2023\par If no complications transition back to Eliquis post procedure\par \par addendum\par  schedule overnight oximetry on CPAP with O2

## 2023-07-03 NOTE — HISTORY OF PRESENT ILLNESS
[Former] : former [TextBox_4] : 71-year-old female\par issue colonoscopy was RS next 7/10/23\par repeat  CT PA per DR Nobles\par Neg PE\par Clear lungs\par NOTED scheduled Colonoscopy -issue Eliquis \par \par Is on CPAP\par Demonstrated desaturation on CPAP\par Still tired during the day\par Discussed with her the indication and clinical benefit for O2 2 L bleeding in CPAP\par set up\par \par Post elevated CT PA with pos  PE  recurrent\par  placed on Eliquis protocol\par Remains on TRELEGY with no decline resp sxs\par exertional chest tightness\par Chief complaint cough chest congestion\par Not start any wheezing\par She states home O2 saturations ranging from 92 to 95 96%\par She does have history of pulmonary embolus currently on active therapy with Coumadin\par She is not describing any pleuritic chest pain\par Noted that a review of her prior PT/INR over the past several weeks including September 27, 2022 she was therapeutic with an INR of 2.6\par Pression COPD rule out exacerbation flare\par Pulmonary embolism on active Coumadin (INR therapeutic\par Treatment Z-Munir\par Medrol Dosepak\par \par since last visit required in hot weather required LIAN with chest congestion with relief\par No fever or sputum\par NO pleuritic CP \par Sleep study -severe XENIA\par Pulmonary referral\par Additional information provided at today's visit including a more detailed chart review available with notes provided dating back to 2016 and\par Patient had an orthopedic surgical procedure which was complicated by a postoperative provoked pulmonary embolism\par This by definition and is the second provoked pulmonary embolism post COVID-19 infection\par Complicated pulmonary history\par nocturnal cough\par Chart reviewed\par History COPD \par Patient is status post [facility] May 7, 2022\par COVID positive\par CT scan of the chest demonstrated minimally of acute left lateral 5 through 7 ribs fracture within hematoma of the chest wall post fall on\par Also detected pulmonary embolism of the segmental arteries at the left lower lobe right lower lobe right upper lobe\par Ultrasound Doppler was negative for DVT\par Patient was initially sent home on Eliquis but it was too expensive and switched to Xarelto which patient states was giving her GI symptomatology with diarrhea and subsequently was switched to Coumadin\par Should be noted at today's visit the last INR on July 5, 2022 was 6.9 was advised to hold the Coumadin and then start 2 mg daily after 3 days with repeat evaluation\par Management per primary care physician\par Present is not complaining of any significant left-sided rib pain\par Cough additional complications include patient in a walking boot for Achilles tendinitis\par Also reports a prior history of pneumonia x2 remotely and bronchitis\par Does not describe any recent hospitalizations requiring steroids for COPD\par Has a subacute chronic cough\par Does not describe any wheezing chest congestion chest tightness at rest pleuritic chest pain at present hemoptysis\par No reported purulent sputum\par Patient states she was seen by cardiology reportedly had echocardiogram it was okay I did tell the cardiologist there was a component of exertional chest discomfort\par Patient received the COVID-vaccine Pfizer times 2+ a booster\par Reviewing her additional immunization profile she states flu shots are up-to-date\par She has previously received Prevnar 13 but not PCV 23\par Says has a very minimal tobacco history discontinued all tobacco dating back approximately 50 years\par No reported other history of chemical toxic inhalational exposures\par  [TextBox_19] : Reported positive history obstructive sleep apnea, insomnia

## 2023-07-03 NOTE — PROCEDURE
[FreeTextEntry1] : TAYLOR June 2 2023\par normal flow rates\par   no evidence hypercarbia\par \par Repeat PFT post resolution of embolic disease on CT angiogram protocol\par April 4/17/23\par Flow rates are normal\par FEV1 FVC ratio 78\par Lung volumes demonstrate very mild reduction TLC 76% predicted\par No air-trapping\par Moderate reduction diffusion 61% predicted with a loss of functioning alveolar capillary units\par Overall stable pulmonary physiology without further decline\par \par Pulmonary 6-minute walk exercise study February 27 2023\par Indication pulmonary embolism shortness of breath atypical chest pain\par Baseline O2 saturation 97%\par Sigifredo desaturation to 92%\par Patient ambulated with walker only\par No evidence or indication for portable oxygen therapy protocol\par \par PFT feb 9 2023\par  Flow rates taylor nl\par  TLC 74 %\par  DLCO  57 % with mod  loss fx  alveolar capillary units HGB 13.1\par HGB 13.1\par \par CPAP data compliance\par Data unavailable to January 1, 2023\par Affect 100 symptoms 96.7%\par AHI 7.4\par Encouraged increased usage\par \par Taylor 12/8/22\par Mild dec FVC\par stable\par \par CT chest November 7, 2022\par CT angiogram\par Indication prior pulmonary embolism\par Comparison study of July 30, 2020  report no filling infection\par Negative pulmonary embolism\par no dissection\par Pulmonary arteries normal caliber\par 3 mm subpleural pulmonary nodule right upper lobe\par No evidence of a CT angiogram findings of a pulmonary embolism with resolution and no residual we will discontinue Coumadin treatment protocol\par \par Sleep study\par August 15 August 72,022\par Severe obstructive sleep apnea\par AHI 33\par Positional component\par Insufficient nonsupine study time\par Present time less than 90% saturation on room air 13.4%\par Greater than 30 dB 63%\par Sigifredo desaturation 77.8%\par Impression severe obstructive sleep apnea\par Address treatment protocol focus primarily on CPAP\par On treatment with CPAP we will then require overnight oximetry to use to evaluate for the noted severe oxygen desaturation also can address an attended titration study in view of the severe oxygen desaturation as an alternative\par \par Pulmonary 6 minute exercise study  8/4/2\par ambulated with walker\par  RA O2 sat 96 % \par No RA desaturation\par \par PFT July 6, 2022\par Mild reduction in flow rates\par FVC 70% predicted\par TLC normal range 82% predicted\par RV/TLC ratio 141% predicted\par Diffusion 79% predicted\par Hemoglobin 13.7\par Impression air trapping\par Sawtooth pattern clinical correlation consistent with obstructive sleep apnea rule out noted that this patient has prior data dating back to February 4, 2007 there is some noted decline at the flow rates with stable diffusion and lung volumes\par \par Chest x-ray PA lateral 7/6/22\par Cardiac size normal\par Lung fields clear\par No parenchymal infiltrates pleural effusions or dominant pulmonary nodules\par No pneumothorax\par Pulmonary artery size grossly normal based on plain film

## 2023-07-10 ENCOUNTER — APPOINTMENT (OUTPATIENT)
Dept: SURGERY | Facility: CLINIC | Age: 73
End: 2023-07-10
Payer: MEDICARE

## 2023-07-10 PROCEDURE — 45378 DIAGNOSTIC COLONOSCOPY: CPT

## 2023-07-12 ENCOUNTER — APPOINTMENT (OUTPATIENT)
Dept: FAMILY MEDICINE | Facility: CLINIC | Age: 73
End: 2023-07-12
Payer: MEDICARE

## 2023-07-12 ENCOUNTER — OUTPATIENT (OUTPATIENT)
Dept: OUTPATIENT SERVICES | Facility: HOSPITAL | Age: 73
LOS: 1 days | Discharge: HOME | End: 2023-07-12
Payer: MEDICARE

## 2023-07-12 ENCOUNTER — APPOINTMENT (OUTPATIENT)
Dept: CT IMAGING | Facility: HOSPITAL | Age: 73
End: 2023-07-12

## 2023-07-12 ENCOUNTER — RESULT REVIEW (OUTPATIENT)
Age: 73
End: 2023-07-12

## 2023-07-12 VITALS
HEART RATE: 78 BPM | BODY MASS INDEX: 38.64 KG/M2 | OXYGEN SATURATION: 97 % | WEIGHT: 210 LBS | DIASTOLIC BLOOD PRESSURE: 85 MMHG | TEMPERATURE: 97.3 F | HEIGHT: 62 IN | SYSTOLIC BLOOD PRESSURE: 134 MMHG

## 2023-07-12 DIAGNOSIS — Z96.659 PRESENCE OF UNSPECIFIED ARTIFICIAL KNEE JOINT: Chronic | ICD-10-CM

## 2023-07-12 DIAGNOSIS — K43.2 INCISIONAL HERNIA WITHOUT OBSTRUCTION OR GANGRENE: Chronic | ICD-10-CM

## 2023-07-12 DIAGNOSIS — N39.0 URINARY TRACT INFECTION, SITE NOT SPECIFIED: ICD-10-CM

## 2023-07-12 DIAGNOSIS — K56.690 OTHER PARTIAL INTESTINAL OBSTRUCTION: ICD-10-CM

## 2023-07-12 DIAGNOSIS — Z90.49 ACQUIRED ABSENCE OF OTHER SPECIFIED PARTS OF DIGESTIVE TRACT: Chronic | ICD-10-CM

## 2023-07-12 DIAGNOSIS — Z90.710 ACQUIRED ABSENCE OF BOTH CERVIX AND UTERUS: Chronic | ICD-10-CM

## 2023-07-12 DIAGNOSIS — N81.10 CYSTOCELE, UNSPECIFIED: Chronic | ICD-10-CM

## 2023-07-12 LAB
BILIRUB UR QL STRIP: NEGATIVE
BUN SERPL-MCNC: 20 MG/DL — SIGNIFICANT CHANGE UP (ref 7–23)
CK SERPL-CCNC: 397 U/L — HIGH (ref 26–192)
CLARITY UR: CLEAR
COLLECTION METHOD: NORMAL
CREAT SERPL-MCNC: 0.88 MG/DL — SIGNIFICANT CHANGE UP (ref 0.5–1.3)
EGFR: 69 ML/MIN/1.73M2 — SIGNIFICANT CHANGE UP
GLUCOSE UR-MCNC: NEGATIVE
HCG UR QL: 0.2 EU/DL
HGB UR QL STRIP.AUTO: NORMAL
KETONES UR-MCNC: NORMAL
LEUKOCYTE ESTERASE UR QL STRIP: NORMAL
NITRITE UR QL STRIP: NORMAL
PH UR STRIP: 5.5
PROT UR STRIP-MCNC: NEGATIVE
SP GR UR STRIP: 1.01

## 2023-07-12 PROCEDURE — 99215 OFFICE O/P EST HI 40 MIN: CPT | Mod: 25

## 2023-07-12 PROCEDURE — 45378 DIAGNOSTIC COLONOSCOPY: CPT

## 2023-07-12 PROCEDURE — 81003 URINALYSIS AUTO W/O SCOPE: CPT | Mod: QW

## 2023-07-12 PROCEDURE — 74177 CT ABD & PELVIS W/CONTRAST: CPT | Mod: 26,MH

## 2023-07-13 LAB
APPEARANCE: CLEAR
BACTERIA: ABNORMAL /HPF
BILIRUBIN URINE: NEGATIVE
BLOOD URINE: NEGATIVE
CAST: 0 /LPF
COLOR: YELLOW
EPITHELIAL CELLS: 5 /HPF
GLUCOSE QUALITATIVE U: NEGATIVE MG/DL
KETONES URINE: NEGATIVE MG/DL
LEUKOCYTE ESTERASE URINE: ABNORMAL
MICROSCOPIC-UA: NORMAL
NITRITE URINE: POSITIVE
PH URINE: 6
PROTEIN URINE: NORMAL MG/DL
RED BLOOD CELLS URINE: 0 /HPF
REVIEW: NORMAL
SPECIFIC GRAVITY URINE: >1.03
UROBILINOGEN URINE: 0.2 MG/DL
WHITE BLOOD CELLS URINE: 11 /HPF

## 2023-07-13 NOTE — REVIEW OF SYSTEMS
[Fatigue] : fatigue [Dyspnea on Exertion] : dyspnea on exertion [Abdominal Pain] : abdominal pain [Dysuria] : dysuria [Incontinence] : no incontinence [Nocturia] : no nocturia [Poor Libido] : libido not poor [Hematuria] : no hematuria [Frequency] : frequency [Vaginal Discharge] : no vaginal discharge [Dysmenorrhea] : no dysmenorrhea [Suicidal] : not suicidal [Insomnia] : insomnia [Anxiety] : anxiety [Depression] : depression [Negative] : Heme/Lymph [FreeTextEntry6] : chronic, has COPD [FreeTextEntry7] : pelvic cramping; possibly sigmoid diverticulosis vs UTI

## 2023-07-13 NOTE — HISTORY OF PRESENT ILLNESS
[FreeTextEntry1] : check-up [de-identified] : Patient referred by Dr. Gtz for MM. Patient has suffered with chronic pain; bilateral knees, shoulders, hips. Patient has engaged in joint injections, PT, medication trials and epidurals.  She has difficulty sleeping. Patient had colonoscopy for possible diverticulosis, and has had chronic UTIs over the past few months. Patient admits to long-standing depression, and has been on multiple medications. Patient  has been in therapy, and has seen psychiatry. Patient is not seeing a therapist at present. \par Patient also under considerable stress due to being moved from her apartment.

## 2023-07-18 ENCOUNTER — TRANSCRIPTION ENCOUNTER (OUTPATIENT)
Age: 73
End: 2023-07-18

## 2023-07-18 LAB — BACTERIA UR CULT: ABNORMAL

## 2023-07-24 ENCOUNTER — NON-APPOINTMENT (OUTPATIENT)
Age: 73
End: 2023-07-24

## 2023-07-26 ENCOUNTER — TRANSCRIPTION ENCOUNTER (OUTPATIENT)
Age: 73
End: 2023-07-26

## 2023-07-31 ENCOUNTER — APPOINTMENT (OUTPATIENT)
Dept: PULMONOLOGY | Facility: CLINIC | Age: 73
End: 2023-07-31
Payer: MEDICARE

## 2023-07-31 VITALS — OXYGEN SATURATION: 94 % | DIASTOLIC BLOOD PRESSURE: 68 MMHG | HEART RATE: 66 BPM | SYSTOLIC BLOOD PRESSURE: 125 MMHG

## 2023-07-31 PROCEDURE — 99214 OFFICE O/P EST MOD 30 MIN: CPT | Mod: 25

## 2023-07-31 PROCEDURE — 94010 BREATHING CAPACITY TEST: CPT

## 2023-07-31 NOTE — PROCEDURE
[FreeTextEntry1] : Taylor 7/31/23 Mild OAD  CPAP data Compliance  Usage 96 %  Hours > 4  CPAP 8 cm H20 AHGI 9.0  inc CPAP 9 cm h20  TAYLOR June 2 2023 normal flow rates   no evidence hypercarbia  Repeat PFT post resolution of embolic disease on CT angiogram protocol April 4/17/23 Flow rates are normal FEV1 FVC ratio 78 Lung volumes demonstrate very mild reduction TLC 76% predicted No air-trapping Moderate reduction diffusion 61% predicted with a loss of functioning alveolar capillary units Overall stable pulmonary physiology without further decline  Pulmonary 6-minute walk exercise study February 27 2023 Indication pulmonary embolism shortness of breath atypical chest pain Baseline O2 saturation 97% Sigifredo desaturation to 92% Patient ambulated with walker only No evidence or indication for portable oxygen therapy protocol  PFT feb 9 2023  Flow rates taylor nl  TLC 74 %  DLCO  57 % with mod  loss fx  alveolar capillary units HGB 13.1 HGB 13.1  CPAP data compliance Data unavailable to January 1, 2023 Affect 100 symptoms 96.7% AHI 7.4 Encouraged increased usage  Taylor 12/8/22 Mild dec FVC stable  CT chest November 7, 2022 CT angiogram Indication prior pulmonary embolism Comparison study of July 30, 2020  report no filling infection Negative pulmonary embolism no dissection Pulmonary arteries normal caliber 3 mm subpleural pulmonary nodule right upper lobe No evidence of a CT angiogram findings of a pulmonary embolism with resolution and no residual we will discontinue Coumadin treatment protocol  Sleep study August 15 August 72,022 Severe obstructive sleep apnea AHI 33 Positional component Insufficient nonsupine study time Present time less than 90% saturation on room air 13.4% Greater than 30 dB 63% Sigifredo desaturation 77.8% Impression severe obstructive sleep apnea Address treatment protocol focus primarily on CPAP On treatment with CPAP we will then require overnight oximetry to use to evaluate for the noted severe oxygen desaturation also can address an attended titration study in view of the severe oxygen desaturation as an alternative  Pulmonary 6 minute exercise study  8/4/2 ambulated with walker  RA O2 sat 96 %  No RA desaturation  PFT July 6, 2022 Mild reduction in flow rates FVC 70% predicted TLC normal range 82% predicted RV/TLC ratio 141% predicted Diffusion 79% predicted Hemoglobin 13.7 Impression air trapping Sawtooth pattern clinical correlation consistent with obstructive sleep apnea rule out noted that this patient has prior data dating back to February 4, 2007 there is some noted decline at the flow rates with stable diffusion and lung volumes  Chest x-ray PA lateral 7/6/22 Cardiac size normal Lung fields clear No parenchymal infiltrates pleural effusions or dominant pulmonary nodules No pneumothorax Pulmonary artery size grossly normal based on plain film

## 2023-07-31 NOTE — DISCUSSION/SUMMARY
[FreeTextEntry1] : Recurrent pulmonary embolism on Eliquis protocol now on 5 mg 1 tablet p.o. twice daily Discussed and detail with patient understanding that treatment protocol is long-term lifelong unless there is a impending issues bleeding etc. that require adjustment Cardiology follow-up reviewed Repeat CT PA per cardiology neg  and clear lungs Physical deconditioning  Obesity Is on CPAP Demonstrated desaturation on CPAP Still tired during the day Discussed with her the indication and clinical benefit for O2 2 L bleeding with CPAP  CT chest angiogram official report based Feb 2023 Positive pulmonary embolism Subsegmental filling defects right upper lobe right lower lobe left upper lobe new compared to the prior study No GI symptom or complaints Eliquis 5 mg twice daily Protocol is 10 mg twice daily for the first 7 days of therapy and then 5 mg 1 tablet p.o. twice daily  Also addressed the patient has a recurrent pulmonary embolism and therefore long-term therapy needs to be addressed at which she again is not inclined to want to complete but understands and is in agreement at this time  cardiology ECHO  recommended   COPD  Pos exertional dyspnea  r/o recurrent PE as etiology vs cardiac ischemic disease which was ruled out with nuclear stress test Obesity  Provoked pulmonary embolism dating back to early May 2022 with COVID-19 infection Gating issue based on additional historical data and chart review this is the second provoked pulmonary embolism with a prior dating back to 2016 BUT off AC Status post chest wall trauma with left rib fractures Component of obesity and physical deconditioning contributing to exertional dyspnea XENIA on CPAP Recommendations Long term AC NOAC sec  multiple PE Baseline D-dimer nl  RESMED CPAP  Setting CPAP 8.0 cm H20 Nasal Pillow [employer] 9.0 cm H20 F/u Overnight Oximetry discussed Bleed O2  2 liters/minute  Coumadin OFF  NOTED Hold Advair and Change Trelegy 200 mcg 1 puff QD- ? any clinical  benefit  strong recommended COVID Bi Valent booster  Patient is cleared to undergo scheduled endoscopy Because of recurrent PE and long-term Eliquis will transition to Lovenox for procedure Procedure scheduled for 8/12/2023 We will hold Eliquis on July 7/7-7/8, 2023 Start Lovenox 100 mcg subcutaneous twice daily x3 days discontinue all injections by July 9 and for scheduled repeat procedure  July 10 2023 If no complications transition back to Eliquis post procedure  addendum  schedule overnight oximetry on CPAP with O2

## 2023-07-31 NOTE — HISTORY OF PRESENT ILLNESS
[Former] : former [TextBox_4] : 71-year-old female had urit and called last week and received antibiotics and Medrol with some help. still with some cough admits not taking trelegy because of cost. had recent colonoscpy and had trouble with anesthesia also had CT abdomen. repeat  CT PA per DR Nobles using cpap with o2 now Neg PE Clear lungs NOTED scheduled Colonoscopy -issue Eliquis   Is on CPAP Demonstrated desaturation on CPAP Still tired during the day Discussed with her the indication and clinical benefit for O2 2 L bleeding in CPAP set up  Post elevated CT PA with pos  PE  recurrent  placed on Eliquis protocol Remains on TRELEGY with no decline resp sxs exertional chest tightness Chief complaint cough chest congestion Not start any wheezing She states home O2 saturations ranging from 92 to 95 96% She does have history of pulmonary embolus currently on active therapy with Coumadin She is not describing any pleuritic chest pain Noted that a review of her prior PT/INR over the past several weeks including September 27, 2022 she was therapeutic with an INR of 2.6 Pression COPD rule out exacerbation flare Pulmonary embolism on active Coumadin (INR therapeutic Treatment Z-Munir Medrol Dosepak  since last visit required in hot weather required LIAN with chest congestion with relief No fever or sputum NO pleuritic CP  Sleep study -severe XENIA Pulmonary referral Additional information provided at today's visit including a more detailed chart review available with notes provided dating back to 2016 and Patient had an orthopedic surgical procedure which was complicated by a postoperative provoked pulmonary embolism This by definition and is the second provoked pulmonary embolism post COVID-19 infection Complicated pulmonary history nocturnal cough Chart reviewed History COPD  Patient is status post [facility] May 7, 2022 COVID positive CT scan of the chest demonstrated minimally of acute left lateral 5 through 7 ribs fracture within hematoma of the chest wall post fall on Also detected pulmonary embolism of the segmental arteries at the left lower lobe right lower lobe right upper lobe Ultrasound Doppler was negative for DVT Patient was initially sent home on Eliquis but it was too expensive and switched to Xarelto which patient states was giving her GI symptomatology with diarrhea and subsequently was switched to Coumadin Should be noted at today's visit the last INR on July 5, 2022 was 6.9 was advised to hold the Coumadin and then start 2 mg daily after 3 days with repeat evaluation Management per primary care physician Present is not complaining of any significant left-sided rib pain Cough additional complications include patient in a walking boot for Achilles tendinitis Also reports a prior history of pneumonia x2 remotely and bronchitis Does not describe any recent hospitalizations requiring steroids for COPD Has a subacute chronic cough Does not describe any wheezing chest congestion chest tightness at rest pleuritic chest pain at present hemoptysis No reported purulent sputum Patient states she was seen by cardiology reportedly had echocardiogram it was okay I did tell the cardiologist there was a component of exertional chest discomfort Patient received the COVID-vaccine Pfizer times 2+ a booster Reviewing her additional immunization profile she states flu shots are up-to-date She has previously received Prevnar 13 but not PCV 23 Says has a very minimal tobacco history discontinued all tobacco dating back approximately 50 years No reported other history of chemical toxic inhalational exposures  [TextBox_19] : Reported positive history obstructive sleep apnea, insomnia

## 2023-07-31 NOTE — CONSULT LETTER
[Please see my note below.] : Please see my note below. [Consult Closing:] : Thank you very much for allowing me to participate in the care of this patient.  If you have any questions, please do not hesitate to contact me. [Sincerely,] : Sincerely, [FreeTextEntry3] : Cholo Browning D.O., CODI\par   of Medicine\par  [facility] [employer]\par

## 2023-08-04 ENCOUNTER — APPOINTMENT (OUTPATIENT)
Dept: ORTHOPEDIC SURGERY | Facility: CLINIC | Age: 73
End: 2023-08-04
Payer: MEDICARE

## 2023-08-04 VITALS — WEIGHT: 210 LBS | HEIGHT: 62 IN | BODY MASS INDEX: 38.64 KG/M2

## 2023-08-04 DIAGNOSIS — M17.11 UNILATERAL PRIMARY OSTEOARTHRITIS, RIGHT KNEE: ICD-10-CM

## 2023-08-04 PROCEDURE — 99213 OFFICE O/P EST LOW 20 MIN: CPT

## 2023-08-04 NOTE — DISCUSSION/SUMMARY
[de-identified] : mri left knee to assess meniscus.  will consider scope versus tka pending results.

## 2023-08-04 NOTE — PHYSICAL EXAM
[4 ___] : forward flexion 4[unfilled]/5 [NL (0)] : extension 0 degrees [4___] : quadriceps 4[unfilled]/5 [5___] : hamstring 5[unfilled]/5 [Left] : left knee [Right] : right knee [Degenerative change] : Degenerative change [TWNoteComboBox6] : internal rotation L5 [de-identified] : external rotation 25 degrees [] : no guarding during exam [TWNoteComboBox7] : flexion 130 degrees

## 2023-08-04 NOTE — ASSESSMENT
[FreeTextEntry1] : right knee pain with history of oa. stable with some pain.  left knee pain. mri 10 2018 shows mmt and oa.  history of knee bursectomy.  giving way.  considering scope.  recurrent giving way.    right shoulder pain. mri shows small full thickness cuff tear.  csi 4/20/23.  left shoulder pain. history of left shoulder cuff repair on 2/26/2016 done by me.  pmhx: depression, anxiety, copd, htn, now on blood thinners for PE. history of dvt LUE postop. recently had PE in feb 2023 - currently on eliquis  patient gets tramadol from pcp for chronic pain  - discussed pain management consult with patient.  Admission

## 2023-08-04 NOTE — HISTORY OF PRESENT ILLNESS
[5] : 5 [Localized] : localized [Stabbing] : stabbing [Constant] : constant [de-identified] : 8/16/22: right shoulder and bilateral knee pain back again. 9/13/22:  temp improvement with csi bilateral knees.  right shoulder pain still.  9/29/22:  bilateral knee pain.  1/24/23:  right shoulder pain.  4/20/23:  right shoulder pain persists.  5/23/23: recurrent bilateral knee pain. right shoulder improving. 8/4/23: L knee injection last visit helped temporarily. She reports her knee giving out while walking.  [] : no [FreeTextEntry1] : left knee  [FreeTextEntry6] : bulking.  [de-identified] : none

## 2023-08-08 ENCOUNTER — APPOINTMENT (OUTPATIENT)
Dept: GASTROENTEROLOGY | Facility: CLINIC | Age: 73
End: 2023-08-08
Payer: MEDICARE

## 2023-08-08 VITALS
WEIGHT: 210 LBS | DIASTOLIC BLOOD PRESSURE: 76 MMHG | OXYGEN SATURATION: 90 % | HEIGHT: 62 IN | TEMPERATURE: 98.3 F | SYSTOLIC BLOOD PRESSURE: 121 MMHG | HEART RATE: 61 BPM | BODY MASS INDEX: 38.64 KG/M2

## 2023-08-08 DIAGNOSIS — K57.32 DIVERTICULITIS OF LARGE INTESTINE W/OUT PERFORATION OR ABSCESS W/OUT BLEEDING: ICD-10-CM

## 2023-08-08 PROCEDURE — 99203 OFFICE O/P NEW LOW 30 MIN: CPT

## 2023-08-08 NOTE — REVIEW OF SYSTEMS
[Negative] : Heme/Lymph [Abdominal Pain] : abdominal pain [Constipation] : constipation [Diarrhea] : diarrhea [Bloating (gassiness)] : bloating

## 2023-08-08 NOTE — ASSESSMENT
[FreeTextEntry1] : 72-year-old white lady with COPD recurrent pulmonary embolism on Eliquis with a history of UTIs and lower abdominal pain for the past 2 months.  I reviewed her recent labs and CT scan of the abdomen which showed extensive diverticulosis of sigmoid without any acute diverticulitis bowel obstruction.  Her symptoms most likely are due to diverticulosis.  I discussed with her dietary changes to include high-fiber diet and fluids, to take Metamucil daily. I also advised probiotics and simethicone as needed.  I explained to her there is no need of further work-up for her abdominal pain at this time

## 2023-08-08 NOTE — HISTORY OF PRESENT ILLNESS
[FreeTextEntry1] : Ceci Ruano is a 72-year-old white female, retired OR tech from [employer] came for consultation for evaluation of 2 months history lower abdominal discomfort and pain which started after an episode of urinary tract infection. She denied fever, chills, nausea or vomiting however she reports having alternating constipation and diarrhea for several years.  She had a colonoscopy attempted by Dr. Kelly held 2 weeks ago reportedly incomplete due to redundant and tortuous sigmoid.  See subsequent CT scan of the abdomen showed extensive diverticulosis involving the sigmoid without diverticulitis no obstructing lesions in the bowel.  Rest of the solid organs were reported normal.  She reports having normal appetite and denied rectal bleeding or melena or any involuntary weight loss. She has history of recurring pulmonary embolism and COPD for which she is on Eliquis and CPAP.  She has bilateral knee pain and unsteady gait therefore she uses a walker support

## 2023-08-08 NOTE — PHYSICAL EXAM
[Alert] : alert [Normal Voice/Communication] : normal voice/communication [Healthy Appearing] : healthy appearing [No Acute Distress] : no acute distress [Sclera] : the sclera and conjunctiva were normal [Hearing Threshold Finger Rub Not [place]] : hearing was normal [Normal Lips/Gums] : the lips and gums were normal [Oropharynx] : the oropharynx was normal [Normal Appearance] : the appearance of the neck was normal [No Neck Mass] : no neck mass was observed [No Respiratory Distress] : no respiratory distress [No Acc Muscle Use] : no accessory muscle use [Respiration, Rhythm And Depth] : normal respiratory rhythm and effort [Auscultation Breath Sounds / Voice Sounds] : lungs were clear to auscultation bilaterally [Heart Rate And Rhythm] : heart rate was normal and rhythm regular [Normal S1, S2] : normal S1 and S2 [Murmurs] : no murmurs [None] : no edema [Bowel Sounds] : normal bowel sounds [No Masses] : no abdominal mass palpated [Abdomen Soft] : soft [] : no hepatosplenomegaly [Cervical Lymph Nodes Enlarged Posterior Bilaterally] : no posterior cervical lymphadenopathy [Cervical Lymph Nodes Enlarged Anterior Bilaterally] : no anterior cervical lymphadenopathy [No CVA Tenderness] : no CVA  tenderness [No Spinal Tenderness] : no spinal tenderness [Oriented To Time, Place, And Person] : oriented to person, place, and time [LUQ] : LUQ [LLQ] : LLQ [de-identified] : Mild fullness and tenderness in Left side abd [de-identified] : walks with walker support

## 2023-08-09 ENCOUNTER — APPOINTMENT (OUTPATIENT)
Dept: MRI IMAGING | Facility: CLINIC | Age: 73
End: 2023-08-09
Payer: MEDICARE

## 2023-08-09 PROCEDURE — 73721 MRI JNT OF LWR EXTRE W/O DYE: CPT | Mod: LT,MH

## 2023-08-20 ENCOUNTER — RX RENEWAL (OUTPATIENT)
Age: 73
End: 2023-08-20

## 2023-08-21 ENCOUNTER — NON-APPOINTMENT (OUTPATIENT)
Age: 73
End: 2023-08-21

## 2023-08-22 ENCOUNTER — APPOINTMENT (OUTPATIENT)
Dept: ORTHOPEDIC SURGERY | Facility: CLINIC | Age: 73
End: 2023-08-22
Payer: MEDICARE

## 2023-08-22 PROCEDURE — 99214 OFFICE O/P EST MOD 30 MIN: CPT

## 2023-08-22 NOTE — ASSESSMENT
[FreeTextEntry1] : right knee pain with history of oa. stable with some pain.  left knee pain. mri 2023 shows mmt and oa. history of bursectomy for infection by outside doc about 10 years ago.  discussed all options including tka versus scope.  she does not want tka at this time and realizes she may need this in the future.  she still has some cartilage so it is possible scope will help her mechanical symptoms.    right shoulder pain. mri shows small full thickness cuff tear.  csi 4/20/23.  left shoulder pain. history of left shoulder cuff repair on 2/26/2016 done by me.  pmhx: depression, anxiety, copd, htn, now on blood thinners for PE. history of dvt LUE postop. recently had PE in feb 2023 - currently on eliquis  patient gets tramadol from pcp for chronic pain  - discussed pain management consult with patient.

## 2023-08-22 NOTE — DISCUSSION/SUMMARY
[de-identified] : will plan on left knee scope, pmm.  rba discussed including further treatment for  knee oa pending medical optimization.  she understands her medical risks as well.

## 2023-08-22 NOTE — HISTORY OF PRESENT ILLNESS
[Dull/Aching] : dull/aching [Sharp] : sharp [5] : 5 [Localized] : localized [Stabbing] : stabbing [Constant] : constant [de-identified] : 8/16/22: right shoulder and bilateral knee pain back again. 9/13/22:  temp improvement with csi bilateral knees.  right shoulder pain still.  9/29/22:  bilateral knee pain.  1/24/23:  right shoulder pain.  4/20/23:  right shoulder pain persists.  5/23/23: recurrent bilateral knee pain. right shoulder improving. 8/4/23: L knee injection last visit helped temporarily. She reports her knee giving out while walking.  8/22/23:  left knee pain persists. [] : no [FreeTextEntry1] : left knee  [FreeTextEntry6] : bulking.  [de-identified] : none

## 2023-08-22 NOTE — PHYSICAL EXAM
[4 ___] : forward flexion 4[unfilled]/5 [NL (0)] : extension 0 degrees [4___] : quadriceps 4[unfilled]/5 [5___] : hamstring 5[unfilled]/5 [Left] : left knee [Right] : right knee [Degenerative change] : Degenerative change [TWNoteComboBox6] : internal rotation L5 [de-identified] : external rotation 25 degrees [] : no guarding during exam [TWNoteComboBox7] : flexion 90 degrees

## 2023-08-31 ENCOUNTER — APPOINTMENT (OUTPATIENT)
Dept: PULMONOLOGY | Facility: CLINIC | Age: 73
End: 2023-08-31
Payer: MEDICARE

## 2023-08-31 VITALS — DIASTOLIC BLOOD PRESSURE: 78 MMHG | HEART RATE: 76 BPM | OXYGEN SATURATION: 92 % | SYSTOLIC BLOOD PRESSURE: 125 MMHG

## 2023-08-31 LAB — POCT - HEMOGLOBIN (HGB), QUANTITATIVE, TRANSCUTANEOUS: 14.1

## 2023-08-31 PROCEDURE — 94727 GAS DIL/WSHOT DETER LNG VOL: CPT

## 2023-08-31 PROCEDURE — 88738 HGB QUANT TRANSCUTANEOUS: CPT

## 2023-08-31 PROCEDURE — 71046 X-RAY EXAM CHEST 2 VIEWS: CPT

## 2023-08-31 PROCEDURE — 94729 DIFFUSING CAPACITY: CPT

## 2023-08-31 PROCEDURE — 94010 BREATHING CAPACITY TEST: CPT

## 2023-08-31 PROCEDURE — ZZZZZ: CPT

## 2023-08-31 PROCEDURE — 99214 OFFICE O/P EST MOD 30 MIN: CPT | Mod: 25

## 2023-08-31 NOTE — HISTORY OF PRESENT ILLNESS
[Former] : former [TextBox_4] : 71-year-old female  cough  yellow  sputum on Trelegy  had recent colonoscpy and had trouble with anesthesia also had CT abdomen. repeat  CT PA per DR Nobles using cpap with o2 now Neg PE Clear lungs NOTED scheduled Colonoscopy -issue Eliquis   Is on CPAP Demonstrated desaturation on CPAP Still tired during the day Discussed with her the indication and clinical benefit for O2 2 L bleeding in CPAP set up  Post elevated CT PA with pos  PE  recurrent  placed on Eliquis protocol Remains on TRELEGY with no decline resp sxs exertional chest tightness Chief complaint cough chest congestion Not start any wheezing She states home O2 saturations ranging from 92 to 95 96% She does have history of pulmonary embolus currently on active therapy with Coumadin She is not describing any pleuritic chest pain Noted that a review of her prior PT/INR over the past several weeks including September 27, 2022 she was therapeutic with an INR of 2.6 Pression COPD rule out exacerbation flare Pulmonary embolism on active Coumadin (INR therapeutic Treatment Z-Munir Medrol Dosepak  since last visit required in hot weather required LIAN with chest congestion with relief No fever or sputum NO pleuritic CP  Sleep study -severe XENIA Pulmonary referral Additional information provided at today's visit including a more detailed chart review available with notes provided dating back to 2016 and Patient had an orthopedic surgical procedure which was complicated by a postoperative provoked pulmonary embolism This by definition and is the second provoked pulmonary embolism post COVID-19 infection Complicated pulmonary history nocturnal cough Chart reviewed History COPD  Patient is status post [facility] May 7, 2022 COVID positive CT scan of the chest demonstrated minimally of acute left lateral 5 through 7 ribs fracture within hematoma of the chest wall post fall on Also detected pulmonary embolism of the segmental arteries at the left lower lobe right lower lobe right upper lobe Ultrasound Doppler was negative for DVT Patient was initially sent home on Eliquis but it was too expensive and switched to Xarelto which patient states was giving her GI symptomatology with diarrhea and subsequently was switched to Coumadin Should be noted at today's visit the last INR on July 5, 2022 was 6.9 was advised to hold the Coumadin and then start 2 mg daily after 3 days with repeat evaluation Management per primary care physician Present is not complaining of any significant left-sided rib pain Cough additional complications include patient in a walking boot for Achilles tendinitis Also reports a prior history of pneumonia x2 remotely and bronchitis Does not describe any recent hospitalizations requiring steroids for COPD Has a subacute chronic cough Does not describe any wheezing chest congestion chest tightness at rest pleuritic chest pain at present hemoptysis No reported purulent sputum Patient states she was seen by cardiology reportedly had echocardiogram it was okay I did tell the cardiologist there was a component of exertional chest discomfort Patient received the COVID-vaccine Pfizer times 2+ a booster Reviewing her additional immunization profile she states flu shots are up-to-date She has previously received Prevnar 13 but not PCV 23 Says has a very minimal tobacco history discontinued all tobacco dating back approximately 50 years No reported other history of chemical toxic inhalational exposures  [TextBox_19] : Reported positive history obstructive sleep apnea, insomnia

## 2023-08-31 NOTE — DISCUSSION/SUMMARY
[FreeTextEntry1] : Stable to interval improvement of overall pulmonary physiology with chest x-ray imaging clear Will not change treatment protocol No strong indication for nebulizer treatment protocol   Recurrent pulmonary embolism on Eliquis protocol now on 5 mg 1 tablet p.o. twice daily Discussed and detail with patient understanding that treatment protocol is long-term lifelong unless there is a impending issues bleeding etc. that require adjustment Cardiology follow-up reviewed Repeat CT PA per cardiology neg  and clear lungs Physical deconditioning  Obesity Is on CPAP Demonstrated desaturation on CPAP Still tired during the day Discussed with her the indication and clinical benefit for O2 2 L bleeding with CPAP  CT chest angiogram official report based Feb 2023 Positive pulmonary embolism Subsegmental filling defects right upper lobe right lower lobe left upper lobe new compared to the prior study No GI symptom or complaints Eliquis 5 mg twice daily Protocol is 10 mg twice daily for the first 7 days of therapy and then 5 mg 1 tablet p.o. twice daily  Also addressed the patient has a recurrent pulmonary embolism and therefore long-term therapy needs to be addressed at which she again is not inclined to want to complete but understands and is in agreement at this time  cardiology ECHO  recommended   COPD  Pos exertional dyspnea  r/o recurrent PE as etiology vs cardiac ischemic disease which was ruled out with nuclear stress test Obesity  Provoked pulmonary embolism dating back to early May 2022 with COVID-19 infection Gating issue based on additional historical data and chart review this is the second provoked pulmonary embolism with a prior dating back to 2016 BUT off AC Status post chest wall trauma with left rib fractures Component of obesity and physical deconditioning contributing to exertional dyspnea XENIA on CPAP Recommendations Long term AC NOAC sec  multiple PE Baseline D-dimer nl  RESMED CPAP  Setting CPAP 8.0 cm H20 Nas[employer] 9.0 cm H20 F/u Overnight Oximetry discussed Bleed O2  2 liters/minute  Coumadin OFF  NOTED Hold Advair and Change Trelegy 200 mcg 1 puff QD- ? any clinical  benefit  strong recommended COVID Bi Valent booster  Patient is cleared to undergo scheduled endoscopy Because of recurrent PE and long-term Eliquis will transition to Lovenox for procedure Procedure scheduled for 8/12/2023 We will hold Eliquis on July 7/7-7/8, 2023 Start Lovenox 100 mcg subcutaneous twice daily x3 days discontinue all injections by July 9 and for scheduled repeat procedure  July 10 2023 completed for colonoscopy If no complications transition back to Eliquis post procedure

## 2023-08-31 NOTE — CONSULT LETTER
[Please see my note below.] : Please see my note below. [Consult Closing:] : Thank you very much for allowing me to participate in the care of this patient.  If you have any questions, please do not hesitate to contact me. [Sincerely,] : Sincerely, [FreeTextEntry3] : Cholo Browning D.O., CODI\par   of Medicine\par  [employer]\par

## 2023-08-31 NOTE — PROCEDURE
[FreeTextEntry1] : PFT indication shortness of breath 23 Spirometry normal Mild restriction TLC 76% predicted Diffusion low but normal range 70% predicted Comparison to prior studies demonstrates overall interval improvement of pulmonary physiology.  Chest x-ray PA lateral August 31, 2023 Chief complaint mucus Cardiac size normal Clear lung fields without parenchymal infiltrate pleural effusion or pulmonary nodules Soft tissue bony structures unremarkable  [place] 7/31/23 Mild OAD  CPAP data Compliance  Usage 96 %  Hours > 4  CPAP 8 cm H20 AHGI 9.0  inc CPAP 9 cm h20  [place] June 2 2023 normal flow rates   no evidence hypercarbia  Repeat PFT post resolution of embolic disease on CT angiogram protocol April 4/17/23 Flow rates are normal FEV1 FVC ratio 78 Lung volumes demonstrate very mild reduction TLC 76% predicted No air-trapping Moderate reduction diffusion 61% predicted with a loss of functioning alveolar capillary units Overall stable pulmonary physiology without further decline  Pulmonary 6-minute walk exercise study February 27 2023 Indication pulmonary embolism shortness of breath atypical chest pain Baseline O2 saturation 97% Sigifredo desaturation to 92% Patient ambulated with walker only No evidence or indication for portable oxygen therapy protocol  PFT feb 9 2023  Flow rates taylor nl  TLC 74 %  DLCO  57 % with mod  loss fx  alveolar capillary units HGB 13.1 HGB 13.1  CPAP data compliance Data unavailable to January 1, 2023 Affect 100 symptoms 96.7% AHI 7.4 Encouraged increased usage  [place] 12/8/22 Mild dec FVC stable  CT chest November 7, 2022 CT angiogram Indication prior pulmonary embolism Comparison study of July 30, 2020  report no filling infection Negative pulmonary embolism no dissection Pulmonary arteries normal caliber 3 mm subpleural pulmonary nodule right upper lobe No evidence of a CT angiogram findings of a pulmonary embolism with resolution and no residual we will discontinue Coumadin treatment protocol  Sleep study August 15 August 72,022 Severe obstructive sleep apnea AHI 33 Positional component Insufficient nonsupine study time Present time less than 90% saturation on room air 13.4% Greater than 30 dB 63% Sigifredo desaturation 77.8% Impression severe obstructive sleep apnea Address treatment protocol focus primarily on CPAP On treatment with CPAP we will then require overnight oximetry to use to evaluate for the noted severe oxygen desaturation also can address an attended titration study in view of the severe oxygen desaturation as an alternative  Pulmonary 6 minute exercise study  8/4/2 ambulated with walker  RA O2 sat 96 %  No RA desaturation  PFT July 6, 2022 Mild reduction in flow rates FVC 70% predicted TLC normal range 82% predicted RV/TLC ratio 141% predicted Diffusion 79% predicted Hemoglobin 13.7 Impression air trapping Sawtooth pattern clinical correlation consistent with obstructive sleep apnea rule out noted that this patient has prior data dating back to February 4, 2007 there is some noted decline at the flow rates with stable diffusion and lung volumes  Chest x-ray PA lateral 7/6/22 Cardiac size normal Lung fields clear No parenchymal infiltrates pleural effusions or dominant pulmonary nodules No pneumothorax Pulmonary artery size grossly normal based on plain film

## 2023-10-10 ENCOUNTER — APPOINTMENT (OUTPATIENT)
Dept: FAMILY MEDICINE | Facility: CLINIC | Age: 73
End: 2023-10-10
Payer: MEDICARE

## 2023-10-10 VITALS
WEIGHT: 205 LBS | TEMPERATURE: 98 F | HEART RATE: 65 BPM | SYSTOLIC BLOOD PRESSURE: 125 MMHG | OXYGEN SATURATION: 97 % | DIASTOLIC BLOOD PRESSURE: 78 MMHG | HEIGHT: 62 IN | BODY MASS INDEX: 37.73 KG/M2 | RESPIRATION RATE: 16 BRPM

## 2023-10-10 DIAGNOSIS — K57.90 DIVERTICULOSIS OF INTESTINE, PART UNSPECIFIED, W/OUT PERFORATION OR ABSCESS W/OUT BLEEDING: ICD-10-CM

## 2023-10-10 DIAGNOSIS — F41.9 ANXIETY DISORDER, UNSPECIFIED: ICD-10-CM

## 2023-10-10 PROCEDURE — 99214 OFFICE O/P EST MOD 30 MIN: CPT | Mod: 25

## 2023-10-10 PROCEDURE — 36415 COLL VENOUS BLD VENIPUNCTURE: CPT

## 2023-10-11 LAB — DEPRECATED D DIMER PPP IA-ACNC: <150 NG/ML DDU

## 2023-10-23 ENCOUNTER — APPOINTMENT (OUTPATIENT)
Age: 73
End: 2023-10-23

## 2023-10-26 ENCOUNTER — APPOINTMENT (OUTPATIENT)
Dept: ORTHOPEDIC SURGERY | Facility: CLINIC | Age: 73
End: 2023-10-26
Payer: MEDICARE

## 2023-10-26 VITALS — HEIGHT: 62 IN | BODY MASS INDEX: 37.54 KG/M2 | WEIGHT: 204 LBS

## 2023-10-26 DIAGNOSIS — Z86.711 PERSONAL HISTORY OF PULMONARY EMBOLISM: ICD-10-CM

## 2023-10-26 PROCEDURE — 99214 OFFICE O/P EST MOD 30 MIN: CPT

## 2023-10-31 ENCOUNTER — APPOINTMENT (OUTPATIENT)
Dept: ORTHOPEDIC SURGERY | Facility: CLINIC | Age: 73
End: 2023-10-31

## 2023-11-01 ENCOUNTER — RX RENEWAL (OUTPATIENT)
Age: 73
End: 2023-11-01

## 2023-11-02 ENCOUNTER — APPOINTMENT (OUTPATIENT)
Dept: PULMONOLOGY | Facility: CLINIC | Age: 73
End: 2023-11-02
Payer: MEDICARE

## 2023-11-02 VITALS — SYSTOLIC BLOOD PRESSURE: 129 MMHG | HEART RATE: 73 BPM | OXYGEN SATURATION: 92 % | DIASTOLIC BLOOD PRESSURE: 75 MMHG

## 2023-11-02 DIAGNOSIS — Z23 ENCOUNTER FOR IMMUNIZATION: ICD-10-CM

## 2023-11-02 PROCEDURE — 90662 IIV NO PRSV INCREASED AG IM: CPT

## 2023-11-02 PROCEDURE — 36415 COLL VENOUS BLD VENIPUNCTURE: CPT

## 2023-11-02 PROCEDURE — G0008: CPT

## 2023-11-02 PROCEDURE — 99214 OFFICE O/P EST MOD 30 MIN: CPT | Mod: 25

## 2023-11-03 ENCOUNTER — NON-APPOINTMENT (OUTPATIENT)
Age: 73
End: 2023-11-03

## 2023-11-03 LAB
APCR PPP: 2.62 RATIO
AT III PPP CHRO-ACNC: 168 %
FVL BLANK: 39
FVL TEST: 102
HCYS SERPL-MCNC: 11.1 UMOL/L
PROT S AG ACT/NOR PPP IA: 83 %

## 2023-11-06 ENCOUNTER — APPOINTMENT (OUTPATIENT)
Dept: PULMONOLOGY | Facility: CLINIC | Age: 73
End: 2023-11-06

## 2023-11-08 LAB
ANA SER IF-ACNC: NEGATIVE
HLX MTHFR FINAL REPORT: NORMAL

## 2023-12-04 ENCOUNTER — APPOINTMENT (OUTPATIENT)
Dept: PULMONOLOGY | Facility: CLINIC | Age: 73
End: 2023-12-04
Payer: MEDICARE

## 2023-12-04 VITALS — OXYGEN SATURATION: 94 % | DIASTOLIC BLOOD PRESSURE: 76 MMHG | HEART RATE: 63 BPM | SYSTOLIC BLOOD PRESSURE: 144 MMHG

## 2023-12-04 DIAGNOSIS — R05.3 CHRONIC COUGH: ICD-10-CM

## 2023-12-04 PROCEDURE — 99214 OFFICE O/P EST MOD 30 MIN: CPT | Mod: 25

## 2023-12-04 PROCEDURE — 94727 GAS DIL/WSHOT DETER LNG VOL: CPT

## 2023-12-04 PROCEDURE — 94010 BREATHING CAPACITY TEST: CPT

## 2023-12-04 PROCEDURE — ZZZZZ: CPT

## 2023-12-04 PROCEDURE — 71046 X-RAY EXAM CHEST 2 VIEWS: CPT

## 2023-12-04 PROCEDURE — 94729 DIFFUSING CAPACITY: CPT

## 2023-12-08 ENCOUNTER — APPOINTMENT (OUTPATIENT)
Dept: FAMILY MEDICINE | Facility: CLINIC | Age: 73
End: 2023-12-08
Payer: MEDICARE

## 2023-12-08 VITALS
RESPIRATION RATE: 16 BRPM | HEIGHT: 62 IN | BODY MASS INDEX: 36.62 KG/M2 | HEART RATE: 76 BPM | OXYGEN SATURATION: 93 % | DIASTOLIC BLOOD PRESSURE: 79 MMHG | TEMPERATURE: 98.9 F | SYSTOLIC BLOOD PRESSURE: 142 MMHG | WEIGHT: 199 LBS

## 2023-12-08 PROCEDURE — 99213 OFFICE O/P EST LOW 20 MIN: CPT | Mod: 25

## 2023-12-08 RX ORDER — DOXYCYCLINE HYCLATE 100 MG/1
100 TABLET ORAL
Qty: 14 | Refills: 0 | Status: DISCONTINUED | COMMUNITY
Start: 2023-11-27 | End: 2023-12-08

## 2023-12-08 RX ORDER — AZITHROMYCIN 250 MG/1
250 TABLET, FILM COATED ORAL
Qty: 1 | Refills: 0 | Status: DISCONTINUED | COMMUNITY
Start: 2023-08-21 | End: 2023-12-08

## 2023-12-08 RX ORDER — MONTELUKAST 10 MG/1
10 TABLET, FILM COATED ORAL
Qty: 1 | Refills: 1 | Status: DISCONTINUED | COMMUNITY
Start: 2022-06-03 | End: 2023-12-08

## 2023-12-08 RX ORDER — CEFUROXIME AXETIL 500 MG/1
500 TABLET ORAL
Qty: 14 | Refills: 0 | Status: DISCONTINUED | COMMUNITY
Start: 2023-07-24 | End: 2023-12-08

## 2023-12-08 RX ORDER — METHYLPREDNISOLONE 4 MG/1
4 TABLET ORAL
Qty: 1 | Refills: 0 | Status: DISCONTINUED | COMMUNITY
Start: 2023-07-24 | End: 2023-12-08

## 2023-12-08 RX ORDER — METHYLPREDNISOLONE 4 MG/1
4 TABLET ORAL
Qty: 21 | Refills: 0 | Status: DISCONTINUED | COMMUNITY
Start: 2023-08-21 | End: 2023-12-08

## 2023-12-08 RX ORDER — METHYLPREDNISOLONE 4 MG/1
4 TABLET ORAL
Qty: 1 | Refills: 0 | Status: DISCONTINUED | COMMUNITY
Start: 2023-11-27 | End: 2023-12-08

## 2023-12-19 DIAGNOSIS — J32.9 CHRONIC SINUSITIS, UNSPECIFIED: ICD-10-CM

## 2023-12-19 DIAGNOSIS — J34.9 UNSPECIFIED DISORDER OF NOSE AND NASAL SINUSES: ICD-10-CM

## 2023-12-19 RX ORDER — FLUTICASONE PROPIONATE 50 UG/1
50 SPRAY, METERED NASAL
Qty: 1 | Refills: 3 | Status: ACTIVE | COMMUNITY
Start: 2023-12-19 | End: 1900-01-01

## 2023-12-28 ENCOUNTER — RX RENEWAL (OUTPATIENT)
Age: 73
End: 2023-12-28

## 2023-12-31 PROBLEM — Z23 NEED FOR VACCINATION WITH 13-POLYVALENT PNEUMOCOCCAL CONJUGATE VACCINE: Status: ACTIVE | Noted: 2019-01-23

## 2024-01-18 ENCOUNTER — APPOINTMENT (OUTPATIENT)
Dept: PULMONOLOGY | Facility: CLINIC | Age: 74
End: 2024-01-18
Payer: MEDICARE

## 2024-01-18 VITALS — HEART RATE: 65 BPM | DIASTOLIC BLOOD PRESSURE: 73 MMHG | OXYGEN SATURATION: 93 % | SYSTOLIC BLOOD PRESSURE: 150 MMHG

## 2024-01-18 DIAGNOSIS — G47.33 OBSTRUCTIVE SLEEP APNEA (ADULT) (PEDIATRIC): ICD-10-CM

## 2024-01-18 DIAGNOSIS — I26.94 MULTI SUBSEGMENTAL PULMONARY EMBO: ICD-10-CM

## 2024-01-18 PROCEDURE — 94010 BREATHING CAPACITY TEST: CPT

## 2024-01-18 PROCEDURE — 99214 OFFICE O/P EST MOD 30 MIN: CPT | Mod: 25

## 2024-01-18 NOTE — PROCEDURE
[FreeTextEntry1] : [place] No BD 1/18/24  flow rates nl  noted  decline Asx  Chest x-ray PA lateral 12/4/2023 Cardiac size normal No parenchymal infiltrates pleural effusions with dominant pulmonary nodules No pneumothorax Soft tissue bony structures grossly unremarkable PFT December 4, 2023 Spirometry normal flow rates Ratio 77 Lung volumes unremarkable TLC 79% predicted Diffusion mild reduction 58% predicted with a loss of functioning alveolar capillary units   PFT indication shortness of breath 8/31/23 Spirometry normal Mild restriction TLC 76% predicted Diffusion low but normal range 70% predicted Comparison to prior studies demonstrates overall interval improvement of pulmonary physiology.  Chest x-ray PA lateral August 31, 2023 Chief complaint mucus Cardiac size normal Clear lung fields without parenchymal infiltrate pleural effusion or pulmonary nodules Soft tissue bony structures unremarkable  [place] 7/31/23 Mild OAD  CPAP data Compliance  Usage 96 %  Hours > 4  CPAP 8 cm H20 AHI 9.0  inc CPAP 9 cm h20  [place] June 2 2023 normal flow rates   no evidence hypercarbia  Repeat PFT post resolution of embolic disease on CT angiogram protocol April 4/17/23 Flow rates are normal FEV1 FVC ratio 78 Lung volumes demonstrate very mild reduction TLC 76% predicted No air-trapping Moderate reduction diffusion 61% predicted with a loss of functioning alveolar capillary units Overall stable pulmonary physiology without further decline  Pulmonary 6-minute walk exercise study February 27 2023 Indication pulmonary embolism shortness of breath atypical chest pain Baseline O2 saturation 97% Sigifredo desaturation to 92% Patient ambulated with walker only No evidence or indication for portable oxygen therapy protocol  PFT feb 9 2023  Flow rates taylor nl  TLC 74 %  DLCO  57 % with mod  loss fx  alveolar capillary units HGB 13.1 HGB 13.1  CPAP data compliance Data unavailable to January 1, 2023 Affect 100 symptoms 96.7% AHI 7.4 Encouraged increased usage  [place] 12/8/22 Mild dec FVC stable  CT chest November 7, 2022 CT angiogram Indication prior pulmonary embolism Comparison study of July 30, 2020  report no filling infection Negative pulmonary embolism no dissection Pulmonary arteries normal caliber 3 mm subpleural pulmonary nodule right upper lobe No evidence of a CT angiogram findings of a pulmonary embolism with resolution and no residual we will discontinue Coumadin treatment protocol  Sleep study August 15 August 72,022 Severe obstructive sleep apnea AHI 33 Positional component Insufficient nonsupine study time Present time less than 90% saturation on room air 13.4% Greater than 30 dB 63% Sigifredo desaturation 77.8% Impression severe obstructive sleep apnea Address treatment protocol focus primarily on CPAP On treatment with CPAP we will then require overnight oximetry to use to evaluate for the noted severe oxygen desaturation also can address an attended titration study in view of the severe oxygen desaturation as an alternative  Pulmonary 6 minute exercise study  8/4/2 ambulated with walker  RA O2 sat 96 %  No RA desaturation  PFT July 6, 2022 Mild reduction in flow rates FVC 70% predicted TLC normal range 82% predicted RV/TLC ratio 141% predicted Diffusion 79% predicted Hemoglobin 13.7 Impression air trapping Sawtooth pattern clinical correlation consistent with obstructive sleep apnea rule out noted that this patient has prior data dating back to February 4, 2007 there is some noted decline at the flow rates with stable diffusion and lung volumes  Chest x-ray PA lateral 7/6/22 Cardiac size normal Lung fields clear No parenchymal infiltrates pleural effusions or dominant pulmonary nodules No pneumothorax Pulmonary artery size grossly normal based on plain film  blood draw  HD FLU IM 11/2/23

## 2024-01-18 NOTE — HISTORY OF PRESENT ILLNESS
[Former] : former [TextBox_4] : 71-year-old female s/p removal 3 sutures s/p fall [facility] and  facial trauma reportes  scans  neg Nov 28 2023 cough chest  congestion  not comfortable TXED Nov 27 2023  medrol DOXY  ORTHO  noted on Trelegy  had recent colonoscpy and had trouble with anesthesia also had CT abdomen. repeat  CT PA per DR Nobles using cpap with o2 now Neg PE Clear lungs NOTED scheduled Colonoscopy -issue Eliquis   Is on CPAP Demonstrated desaturation on CPAP Still tired during the day Discussed with her the indication and clinical benefit for O2 2 L bleeding in CPAP set up  Post elevated CT PA with pos  PE  recurrent  placed on Eliquis protocol Remains on TRELEGY with no decline resp sxs exertional chest tightness Chief complaint cough chest congestion Not start any wheezing She states home O2 saturations ranging from 92 to 95 96% She does have history of pulmonary embolus currently on active therapy with Coumadin She is not describing any pleuritic chest pain Noted that a review of her prior PT/INR over the past several weeks including September 27, 2022 she was therapeutic with an INR of 2.6 Pression COPD rule out exacerbation flare Pulmonary embolism on active Coumadin (INR therapeutic Treatment Z-Munir Medrol Dosepak  since last visit required in hot weather required LIAN with chest congestion with relief No fever or sputum NO pleuritic CP  Sleep study -severe XENIA Pulmonary referral Additional information provided at today's visit including a more detailed chart review available with notes provided dating back to 2016 and Patient had an orthopedic surgical procedure which was complicated by a postoperative provoked pulmonary embolism This by definition and is the second provoked pulmonary embolism post COVID-19 infection Complicated pulmonary history nocturnal cough Chart reviewed History COPD  Patient is status post [facility] May 7, 2022 COVID positive CT scan of the chest demonstrated minimally of acute left lateral 5 through 7 ribs fracture within hematoma of the chest wall post fall on Also detected pulmonary embolism of the segmental arteries at the left lower lobe right lower lobe right upper lobe Ultrasound Doppler was negative for DVT Patient was initially sent home on Eliquis but it was too expensive and switched to Xarelto which patient states was giving her GI symptomatology with diarrhea and subsequently was switched to Coumadin Should be noted at today's visit the last INR on July 5, 2022 was 6.9 was advised to hold the Coumadin and then start 2 mg daily after 3 days with repeat evaluation Management per primary care physician Present is not complaining of any significant left-sided rib pain Cough additional complications include patient in a walking boot for Achilles tendinitis Also reports a prior history of pneumonia x2 remotely and bronchitis Does not describe any recent hospitalizations requiring steroids for COPD Has a subacute chronic cough Does not describe any wheezing chest congestion chest tightness at rest pleuritic chest pain at present hemoptysis No reported purulent sputum Patient states she was seen by cardiology reportedly had echocardiogram it was okay I did tell the cardiologist there was a component of exertional chest discomfort Patient received the COVID-vaccine Pfizer times 2+ a booster Reviewing her additional immunization profile she states flu shots are up-to-date She has previously received Prevnar 13 but not PCV 23 Says has a very minimal tobacco history discontinued all tobacco dating back approximately 50 years No reported other history of chemical toxic inhalational exposures  [TextBox_19] : Reported positive history obstructive sleep apnea, insomnia

## 2024-01-18 NOTE — CONSULT LETTER
[Please see my note below.] : Please see my note below. [Consult Closing:] : Thank you very much for allowing me to participate in the care of this patient.  If you have any questions, please do not hesitate to contact me. [Sincerely,] : Sincerely, [FreeTextEntry3] : Cholo Browning D.O., CODI\par   of Medicine\par  [employer] [employer]\par

## 2024-01-18 NOTE — DISCUSSION/SUMMARY
[FreeTextEntry1] : Stable to interval improvement of overall pulmonary physiology with chest x-ray imaging clear Will not change treatment protocol No strong indication for nebulizer treatment protocol Addressed need for controller therapy similar issues course Provided samples of air supra 2 puffs twice daily Also provided sips of Eliquis to maintain long-term 5 mg twice daily Recurrent pulmonary embolism on Eliquis protocol now on 5 mg 1 tablet p.o. twice daily Discussed and detail with patient understanding that treatment protocol is long-term lifelong unless there is a impending issues bleeding etc. that require adjustment Cardiology follow-up reviewed Repeat CT PA per cardiology neg  and clear lungs Physical deconditioning  Obesity Is on CPAP Demonstrated desaturation on CPAP Still tired during the day Discussed with her the indication and clinical benefit for O2 2 L bleeding with CPAP  CT chest angiogram official report based Feb 2023 Positive pulmonary embolism Subsegmental filling defects right upper lobe right lower lobe left upper lobe new compared to the prior study No GI symptom or complaints Eliquis 5 mg twice daily Protocol is 10 mg twice daily for the first 7 days of therapy and then 5 mg 1 tablet p.o. twice daily  Also addressed the patient has a recurrent pulmonary embolism and therefore long-term therapy needs to be addressed at which she again is not inclined to want to complete but understands and is in agreement at this time  cardiology ECHO  recommended   COPD  Pos exertional dyspnea  r/o recurrent PE as etiology vs cardiac ischemic disease which was ruled out with nuclear stress test Obesity  Provoked pulmonary embolism dating back to early May 2022 with COVID-19 infection Gating issue based on additional historical data and chart review this is the second provoked pulmonary embolism with a prior dating back to 2016 BUT off AC Status post chest wall trauma with left rib fractures Component of obesity and physical deconditioning contributing to exertional dyspnea XENIA on CPAP Recommendations Long term AC NOAC sec  multiple PE Baseline D-dimer nl  RESMED CPAP  Setting CPAP 8.0 cm H20 Nasal Pillow Inc 9.0 cm H20 F/u Overnight Oximetry discussed Bleed O2  2 liters/minute  Coumadin OFF

## 2024-01-22 ENCOUNTER — APPOINTMENT (OUTPATIENT)
Dept: FAMILY MEDICINE | Facility: CLINIC | Age: 74
End: 2024-01-22

## 2024-01-23 ENCOUNTER — APPOINTMENT (OUTPATIENT)
Dept: ORTHOPEDIC SURGERY | Facility: CLINIC | Age: 74
End: 2024-01-23
Payer: MEDICARE

## 2024-01-23 VITALS — WEIGHT: 199 LBS | BODY MASS INDEX: 36.62 KG/M2 | HEIGHT: 62 IN

## 2024-01-23 DIAGNOSIS — S83.242D OTHER TEAR OF MEDIAL MENISCUS, CURRENT INJURY, LEFT KNEE, SUBSEQUENT ENCOUNTER: ICD-10-CM

## 2024-01-23 PROCEDURE — 99214 OFFICE O/P EST MOD 30 MIN: CPT | Mod: 25

## 2024-01-23 PROCEDURE — 20610 DRAIN/INJ JOINT/BURSA W/O US: CPT | Mod: LT

## 2024-01-23 PROCEDURE — J3490M: CUSTOM | Mod: NC

## 2024-01-23 NOTE — HISTORY OF PRESENT ILLNESS
[Gradual] : gradual [5] : 5 [Dull/Aching] : dull/aching [Localized] : localized [Sharp] : sharp [Stabbing] : stabbing [Constant] : constant [Rest] : rest [Exercising] : exercising [de-identified] : 8/16/22: right shoulder and bilateral knee pain back again. 9/13/22:  temp improvement with csi bilateral knees.  right shoulder pain still.  9/29/22:  bilateral knee pain.  1/24/23:  right shoulder pain.  4/20/23:  right shoulder pain persists.  5/23/23: recurrent bilateral knee pain. right shoulder improving. 8/4/23: L knee injection last visit helped temporarily. She reports her knee giving out while walking.  8/22/23:  left knee pain persists. 1/23/24:  follow up left knee.  saw dr mauricio october 2023 for left total knee and he did not think she was an optimal surgical candidate due to medical issues.  [] : no [FreeTextEntry1] : left knee  [FreeTextEntry6] : bulking.  [de-identified] : none

## 2024-01-23 NOTE — PHYSICAL EXAM
[4 ___] : forward flexion 4[unfilled]/5 [Left] : left knee [NL (0)] : extension 0 degrees [4___] : quadriceps 4[unfilled]/5 [5___] : hamstring 5[unfilled]/5 [Right] : right knee [Degenerative change] : Degenerative change [TWNoteComboBox6] : internal rotation L5 [de-identified] : external rotation 25 degrees [] : no guarding during exam [TWNoteComboBox7] : flexion 90 degrees

## 2024-01-23 NOTE — ASSESSMENT
Patient did not answer. Writer left a message to give us a call at [phone] to relay MD message. Patient need f/u appt. Patient request for refill of Metformin.       [FreeTextEntry1] : left knee pain. mri 2023 shows mmt and oa. history of bursectomy for infection by outside doc about 10 years ago. discussed all options including tka versus scope.  saw dr mauricio october 2023 for total knee consult and he did not think she was an optimal surgical candidate due to her medical issues/ risks.   patient continues to have pain and mechanical symptoms in the knee.  last csi was 5/23/23.  right knee pain with history of oa. stable with some pain.  right shoulder pain. mri shows small full thickness cuff tear. csi 4/20/23.  left shoulder pain. history of left shoulder cuff repair on 2/26/2016 done by me.  pmhx: depression, anxiety, copd, htn, now on blood thinners for PE. history of dvt LUE postop. recently had PE in feb 2023 - currently on eliquis  patient gets tramadol from pcp for chronic pain - discussed pain management consult with patient.

## 2024-02-01 ENCOUNTER — APPOINTMENT (OUTPATIENT)
Dept: FAMILY MEDICINE | Facility: CLINIC | Age: 74
End: 2024-02-01
Payer: MEDICARE

## 2024-02-01 VITALS
SYSTOLIC BLOOD PRESSURE: 137 MMHG | OXYGEN SATURATION: 96 % | BODY MASS INDEX: 37.17 KG/M2 | HEART RATE: 64 BPM | DIASTOLIC BLOOD PRESSURE: 79 MMHG | RESPIRATION RATE: 16 BRPM | WEIGHT: 202 LBS | TEMPERATURE: 98.3 F | HEIGHT: 62 IN

## 2024-02-01 DIAGNOSIS — Z87.898 PERSONAL HISTORY OF OTHER SPECIFIED CONDITIONS: ICD-10-CM

## 2024-02-01 DIAGNOSIS — M54.9 DORSALGIA, UNSPECIFIED: ICD-10-CM

## 2024-02-01 DIAGNOSIS — Z76.0 ENCOUNTER FOR ISSUE OF REPEAT PRESCRIPTION: ICD-10-CM

## 2024-02-01 DIAGNOSIS — G89.29 OTHER CHRONIC PAIN: ICD-10-CM

## 2024-02-01 DIAGNOSIS — Z71.89 OTHER SPECIFIED COUNSELING: ICD-10-CM

## 2024-02-01 DIAGNOSIS — S01.81XA LACERATION W/OUT FOREIGN BODY OF OTHER PART OF HEAD, INITIAL ENCOUNTER: ICD-10-CM

## 2024-02-01 DIAGNOSIS — G47.30 SLEEP APNEA, UNSPECIFIED: ICD-10-CM

## 2024-02-01 DIAGNOSIS — R10.32 LEFT LOWER QUADRANT PAIN: ICD-10-CM

## 2024-02-01 PROCEDURE — 99215 OFFICE O/P EST HI 40 MIN: CPT | Mod: 25

## 2024-02-01 RX ORDER — CEFUROXIME AXETIL 500 MG/1
500 TABLET ORAL
Qty: 14 | Refills: 0 | Status: DISCONTINUED | COMMUNITY
Start: 2023-12-19 | End: 2024-02-01

## 2024-02-01 RX ORDER — ORLISTAT 120 MG/1
120 CAPSULE ORAL 3 TIMES DAILY
Qty: 90 | Refills: 1 | Status: ACTIVE | COMMUNITY
Start: 2024-02-01 | End: 1900-01-01

## 2024-02-01 RX ORDER — SERTRALINE HYDROCHLORIDE 100 MG/1
100 TABLET, FILM COATED ORAL
Qty: 180 | Refills: 1 | Status: ACTIVE | COMMUNITY
Start: 2021-08-25 | End: 1900-01-01

## 2024-02-01 RX ORDER — ENOXAPARIN SODIUM 100 MG/ML
100 INJECTION, SOLUTION SUBCUTANEOUS
Qty: 6 | Refills: 0 | Status: DISCONTINUED | COMMUNITY
Start: 2023-06-02 | End: 2024-02-01

## 2024-02-01 NOTE — HISTORY OF PRESENT ILLNESS
[FreeTextEntry1] : here for follow up and med refills; updates me on her recent passing out a few months ago, requiring stitches, ct head negative following w/ pulmonology and cardiology regularly for hx of PEs and CAD states will need b/l TKR, having trouble w/ walking, uses walker c/o chronic back pain, would like muscle relaxant states dx'd w/ diverticulosis, eating more fiber c/o trouble losing weight, does not want to try injectables

## 2024-02-01 NOTE — PLAN
[FreeTextEntry1] : cont pulm/cardio f/u consult w/ pulmonologist to discuss ?MTHFR mutation refilled robaxin try xenical; healthy eating habits enforced rtc for cpe

## 2024-02-01 NOTE — PHYSICAL EXAM
[No Acute Distress] : no acute distress [Normal Sclera/Conjunctiva] : normal sclera/conjunctiva [EOMI] : extraocular movements intact [Normal Outer Ear/Nose] : the outer ears and nose were normal in appearance [No JVD] : no jugular venous distention [Normal] : normal rate, regular rhythm, normal S1 and S2 and no murmur heard [Coordination Grossly Intact] : coordination grossly intact [Normal Affect] : the affect was normal [Alert and Oriented x3] : oriented to person, place, and time [Normal Insight/Judgement] : insight and judgment were intact [de-identified] : ambulates w/ walker slightly bent over

## 2024-02-08 ENCOUNTER — APPOINTMENT (OUTPATIENT)
Dept: FAMILY MEDICINE | Facility: CLINIC | Age: 74
End: 2024-02-08

## 2024-02-29 ENCOUNTER — APPOINTMENT (OUTPATIENT)
Dept: PULMONOLOGY | Facility: CLINIC | Age: 74
End: 2024-02-29
Payer: MEDICARE

## 2024-02-29 VITALS — OXYGEN SATURATION: 96 % | HEART RATE: 59 BPM | SYSTOLIC BLOOD PRESSURE: 128 MMHG | DIASTOLIC BLOOD PRESSURE: 76 MMHG

## 2024-02-29 DIAGNOSIS — G47.34 IDIOPATHIC SLEEP RELATED NONOBSTRUCTIVE ALVEOLAR HYPOVENTILATION: ICD-10-CM

## 2024-02-29 DIAGNOSIS — J44.9 CHRONIC OBSTRUCTIVE PULMONARY DISEASE, UNSPECIFIED: ICD-10-CM

## 2024-02-29 DIAGNOSIS — R06.09 OTHER FORMS OF DYSPNEA: ICD-10-CM

## 2024-02-29 DIAGNOSIS — I26.99 OTHER PULMONARY EMBOLISM W/OUT ACUTE COR PULMONALE: ICD-10-CM

## 2024-02-29 PROCEDURE — 94060 EVALUATION OF WHEEZING: CPT

## 2024-02-29 PROCEDURE — 99214 OFFICE O/P EST MOD 30 MIN: CPT | Mod: 25

## 2024-02-29 NOTE — PHYSICAL EXAM
[No Acute Distress] : no acute distress [Normal Oropharynx] : normal oropharynx [II] : Mallampati Class: II [Normal Appearance] : normal appearance [Supple] : supple [No JVD] : no jvd [Normal Rate/Rhythm] : normal rate/rhythm [Normal S1, S2] : normal s1, s2 [No Murmurs] : no murmurs [No Resp Distress] : no resp distress [Normal Palpation] : normal palpation [No Acc Muscle Use] : no acc muscle use [Normal Rhythm and Effort] : normal rhythm and effort [Clear to Auscultation Bilaterally] : clear to auscultation bilaterally [Benign] : benign [Normal to Percussion] : normal to percussion [Not Tender] : not tender [Soft] : soft [Normal Bowel Sounds] : normal bowel sounds [No HSM] : no hsm [No Cyanosis] : no cyanosis [No Clubbing] : no clubbing [No Edema] : no edema [Normal Color/ Pigmentation] : normal color/ pigmentation [No Focal Deficits] : no focal deficits [Normal Affect] : normal affect [Oriented x3] : oriented x3 [TextBox_99] : Ambulating with walker in a boot [TextBox_2] : Overweight

## 2024-02-29 NOTE — REVIEW OF SYSTEMS
[Fatigue] : fatigue [GERD] : gerd [Depression] : depression [Negative] : Allergy/Immunology [Fever] : no fever [Chills] : no chills [Anxiety] : no anxiety [Diabetes] : no diabetes [Thyroid Problem] : no thyroid problem [TextBox_30] : HPI [TextBox_83] : History of a renal cyst [TextBox_44] : Hypertension [TextBox_94] : Arthritis [TextBox_122] : Balance disorder

## 2024-02-29 NOTE — CONSULT LETTER
[Please see my note below.] : Please see my note below. [Consult Closing:] : Thank you very much for allowing me to participate in the care of this patient.  If you have any questions, please do not hesitate to contact me. [Sincerely,] : Sincerely, [FreeTextEntry3] : Cholo Browning D.O., CODI\par   of Medicine\par  [facility] [employer]\par

## 2024-02-29 NOTE — PROCEDURE
[FreeTextEntry1] : Taylor 2/29/24  normal flows no decline  Taylor No BD 1/18/24  flow rates nl  noted  decline Asx  Chest x-ray PA lateral 12/4/2023 Cardiac size normal No parenchymal infiltrates pleural effusions with dominant pulmonary nodules No pneumothorax Soft tissue bony structures grossly unremarkable PFT December 4, 2023 Spirometry normal flow rates Ratio 77 Lung volumes unremarkable TLC 79% predicted Diffusion mild reduction 58% predicted with a loss of functioning alveolar capillary units   PFT indication shortness of breath 8/31/23 Spirometry normal Mild restriction TLC 76% predicted Diffusion low but normal range 70% predicted Comparison to prior studies demonstrates overall interval improvement of pulmonary physiology.  Chest x-ray PA lateral August 31, 2023 Chief complaint mucus Cardiac size normal Clear lung fields without parenchymal infiltrate pleural effusion or pulmonary nodules Soft tissue bony structures unremarkable  [place] 7/31/23 Mild OAD  CPAP data Compliance  Usage 96 %  Hours > 4  CPAP 8 cm H20 AHI 9.0  inc CPAP 9 cm h20  TAYLOR June 2 2023 normal flow rates   no evidence hypercarbia  Repeat PFT post resolution of embolic disease on CT angiogram protocol April 4/17/23 Flow rates are normal FEV1 FVC ratio 78 Lung volumes demonstrate very mild reduction TLC 76% predicted No air-trapping Moderate reduction diffusion 61% predicted with a loss of functioning alveolar capillary units Overall stable pulmonary physiology without further decline  Pulmonary 6-minute walk exercise study February 27 2023 Indication pulmonary embolism shortness of breath atypical chest pain Baseline O2 saturation 97% Sigifredo desaturation to 92% Patient ambulated with walker only No evidence or indication for portable oxygen therapy protocol  PFT feb 9 2023  Flow rates taylor nl  TLC 74 %  DLCO  57 % with mod  loss fx  alveolar capillary units HGB 13.1 HGB 13.1  CPAP data compliance Data unavailable to January 1, 2023 Affect 100 symptoms 96.7% AHI 7.4 Encouraged increased usage  Taylor 12/8/22 Mild dec FVC stable  CT chest November 7, 2022 CT angiogram Indication prior pulmonary embolism Comparison study of July 30, 2020  report no filling infection Negative pulmonary embolism no dissection Pulmonary arteries normal caliber 3 mm subpleural pulmonary nodule right upper lobe No evidence of a CT angiogram findings of a pulmonary embolism with resolution and no residual we will discontinue Coumadin treatment protocol  Sleep study August 15 August 72,022 Severe obstructive sleep apnea AHI 33 Positional component Insufficient nonsupine study time Present time less than 90% saturation on room air 13.4% Greater than 30 dB 63% Sigifredo desaturation 77.8% Impression severe obstructive sleep apnea Address treatment protocol focus primarily on CPAP On treatment with CPAP we will then require overnight oximetry to use to evaluate for the noted severe oxygen desaturation also can address an attended titration study in view of the severe oxygen desaturation as an alternative  Pulmonary 6 minute exercise study  8/4/2 ambulated with walker  RA O2 sat 96 %  No RA desaturation  PFT July 6, 2022 Mild reduction in flow rates FVC 70% predicted TLC normal range 82% predicted RV/TLC ratio 141% predicted Diffusion 79% predicted Hemoglobin 13.7 Impression air trapping Sawtooth pattern clinical correlation consistent with obstructive sleep apnea rule out noted that this patient has prior data dating back to February 4, 2007 there is some noted decline at the flow rates with stable diffusion and lung volumes  Chest x-ray PA lateral 7/6/22 Cardiac size normal Lung fields clear No parenchymal infiltrates pleural effusions or dominant pulmonary nodules No pneumothorax Pulmonary artery size grossly normal based on plain film  blood draw  HD FLU IM 11/2/23

## 2024-02-29 NOTE — DISCUSSION/SUMMARY
[FreeTextEntry1] : Stable to interval improvement of overall pulmonary physiology with chest x-ray imaging clear Will not change treatment protocol No strong indication for nebulizer treatment protocol Addressed need for controller therapy similar issues course Provided samples of air supra 2 puffs twice daily CHANGE BREZTRI 2 puffs BID and rinse Also provided sips of Eliquis to maintain long-term 5 mg twice daily Recurrent pulmonary embolism on Eliquis protocol now on 5 mg 1 tablet p.o. twice daily Discussed and detail with patient understanding that treatment protocol is long-term lifelong unless there is a impending issues bleeding etc. that require adjustment Cardiology follow-up reviewed Repeat CT PA per cardiology neg  and clear lungs Physical deconditioning  Obesity Is on CPAP Demonstrated desaturation on CPAP Still tired during the day Discussed with her the indication and clinical benefit for O2 2 L bleeding with CPAP  CT chest angiogram official report based Feb 2023 Positive pulmonary embolism Subsegmental filling defects right upper lobe right lower lobe left upper lobe new compared to the prior study No GI symptom or complaints Eliquis 5 mg twice daily Protocol is 10 mg twice daily for the first 7 days of therapy and then 5 mg 1 tablet p.o. twice daily  Also addressed the patient has a recurrent pulmonary embolism and therefore long-term therapy needs to be addressed at which she again is not inclined to want to complete but understands and is in agreement at this time  cardiology ECHO  recommended   COPD  Pos exertional dyspnea  r/o recurrent PE as etiology vs cardiac ischemic disease which was ruled out with nuclear stress test Obesity  Provoked pulmonary embolism dating back to early May 2022 with COVID-19 infection Gating issue based on additional historical data and chart review this is the second provoked pulmonary embolism with a prior dating back to 2016 BUT off AC Status post chest wall trauma with left rib fractures Component of obesity and physical deconditioning contributing to exertional dyspnea XENIA on CPAP Recommendations Long term AC NOAC sec  multiple PE Baseline D-dimer nl  RESMED CPAP  Setting CPAP 8.0 cm H20 Nasal Pillow Inc 9.0 cm H20 F/u Overnight Oximetry discussed Bleed O2  2 liters/minute  Coumadin OFF

## 2024-02-29 NOTE — HISTORY OF PRESENT ILLNESS
[Former] : former [TextBox_4] : 71-year-old female s/p removal 3 sutures s/p fall [facility] and  facial trauma reportes  scans  neg Nov 28 2023 cough chest  congestion  not comfortable TXED Nov 27 2023  medrol DOXY  ORTHO  noted on Trelegy  had recent colonoscpy and had trouble with anesthesia also had CT abdomen. repeat  CT PA per DR Nobles using cpap with o2 now Neg PE Clear lungs NOTED scheduled Colonoscopy -issue Eliquis   Is on CPAP Demonstrated desaturation on CPAP Still tired during the day Discussed with her the indication and clinical benefit for O2 2 L bleeding in CPAP set up  Post elevated CT PA with pos  PE  recurrent  placed on Eliquis protocol Remains on TRELEGY with no decline resp sxs exertional chest tightness Chief complaint cough chest congestion Not start any wheezing She states home O2 saturations ranging from 92 to 95 96% She does have history of pulmonary embolus currently on active therapy with Coumadin She is not describing any pleuritic chest pain Noted that a review of her prior PT/INR over the past several weeks including September 27, 2022 she was therapeutic with an INR of 2.6 Pression COPD rule out exacerbation flare Pulmonary embolism on active Coumadin (INR therapeutic Treatment Z-Munir Medrol Dosepak  since last visit required in hot weather required LIAN with chest congestion with relief No fever or sputum NO pleuritic CP  Sleep study -severe XENIA Pulmonary referral Additional information provided at today's visit including a more detailed chart review available with notes provided dating back to 2016 and Patient had an orthopedic surgical procedure which was complicated by a postoperative provoked pulmonary embolism This by definition and is the second provoked pulmonary embolism post COVID-19 infection Complicated pulmonary history nocturnal cough Chart reviewed History COPD  Patient is status post [facility] May 7, 2022 COVID positive CT scan of the chest demonstrated minimally of acute left lateral 5 through 7 ribs fracture within hematoma of the chest wall post fall on Also detected pulmonary embolism of the segmental arteries at the left lower lobe right lower lobe right upper lobe Ultrasound Doppler was negative for DVT Patient was initially sent home on Eliquis but it was too expensive and switched to Xarelto which patient states was giving her GI symptomatology with diarrhea and subsequently was switched to Coumadin Should be noted at today's visit the last INR on July 5, 2022 was 6.9 was advised to hold the Coumadin and then start 2 mg daily after 3 days with repeat evaluation Management per primary care physician Present is not complaining of any significant left-sided rib pain Cough additional complications include patient in a walking boot for Achilles tendinitis Also reports a prior history of pneumonia x2 remotely and bronchitis Does not describe any recent hospitalizations requiring steroids for COPD Has a subacute chronic cough Does not describe any wheezing chest congestion chest tightness at rest pleuritic chest pain at present hemoptysis No reported purulent sputum Patient states she was seen by cardiology reportedly had echocardiogram it was okay I did tell the cardiologist there was a component of exertional chest discomfort Patient received the COVID-vaccine Pfizer times 2+ a booster Reviewing her additional immunization profile she states flu shots are up-to-date She has previously received Prevnar 13 but not PCV 23 Says has a very minimal tobacco history discontinued all tobacco dating back approximately 50 years No reported other history of chemical toxic inhalational exposures  [TextBox_19] : Reported positive history obstructive sleep apnea, insomnia

## 2024-03-05 ENCOUNTER — APPOINTMENT (OUTPATIENT)
Dept: ORTHOPEDIC SURGERY | Facility: CLINIC | Age: 74
End: 2024-03-05
Payer: MEDICARE

## 2024-03-05 DIAGNOSIS — M17.12 UNILATERAL PRIMARY OSTEOARTHRITIS, LEFT KNEE: ICD-10-CM

## 2024-03-05 PROCEDURE — 20610 DRAIN/INJ JOINT/BURSA W/O US: CPT | Mod: 50

## 2024-03-05 PROCEDURE — 99214 OFFICE O/P EST MOD 30 MIN: CPT | Mod: 25

## 2024-03-05 NOTE — PHYSICAL EXAM
[4 ___] : forward flexion 4[unfilled]/5 [4___] : quadriceps 4[unfilled]/5 [5___] : hamstring 5[unfilled]/5 [Right] : right knee [Degenerative change] : Degenerative change [Left] : left elbow [NL (0)] : extension 0 degrees [TWNoteComboBox6] : pronation 80 degrees [de-identified] : supination 80 degrees [] : no guarding during exam [TWNoteComboBox7] : flexion 90 degrees

## 2024-03-05 NOTE — HISTORY OF PRESENT ILLNESS
[Gradual] : gradual [5] : 5 [Dull/Aching] : dull/aching [Localized] : localized [Stabbing] : stabbing [Sharp] : sharp [Constant] : constant [Rest] : rest [Exercising] : exercising [de-identified] : 8/16/22: right shoulder and bilateral knee pain back again. 9/13/22:  temp improvement with csi bilateral knees.  right shoulder pain still.  9/29/22:  bilateral knee pain.  1/24/23:  right shoulder pain.  4/20/23:  right shoulder pain persists.  5/23/23: recurrent bilateral knee pain. right shoulder improving. 8/4/23: L knee injection last visit helped temporarily. She reports her knee giving out while walking.  8/22/23:  left knee pain persists. 1/23/24:  follow up left knee.  saw dr mauricio october 2023 for left total knee and he did not think she was an optimal surgical candidate due to medical issues.  [] : no [FreeTextEntry1] : left knee  [FreeTextEntry6] : bulking.  [de-identified] : none

## 2024-03-05 NOTE — PROCEDURE
[Large Joint Injection] : Large joint injection [Bilateral] : bilaterally of the [Knee] : knee [Pain] : pain [Alcohol] : alcohol [Betadine] : betadine [Euflexxa(20mg)] : 20mg of Euflexxa [#1] : series #1 [] : Patient tolerated procedure well [Call if redness, pain or fever occur] : call if redness, pain or fever occur [Apply ice for 15min out of every hour for the next 12-24 hours as tolerated] : apply ice for 15 minutes out of every hour for the next 12-24 hours as tolerated [Patient was advised to rest the joint(s) for ____ days] : patient was advised to rest the joint(s) for [unfilled] days [Previous OTC use and PT nontherapeutic] : patient has tried OTC's including aspirin, Ibuprofen, Aleve, etc or prescription NSAIDS, and/or exercises at home and/or physical therapy without satisfactory response [Patient had decreased mobility in the joint] : patient had decreased mobility in the joint [Risks, benefits, alternatives discussed / Verbal consent obtained] : the risks benefits, and alternatives have been discussed, and verbal consent was obtained [Prior failure or difficult injection] : prior failure or difficult injection [All ultrasound images have been permanently captured and stored accordingly in our picture archiving and communication system] : All ultrasound images have been permanently captured and stored accordingly in our picture archiving and communication system [Visualization of the needle and placement of injection was performed without complication] : visualization of the needle and placement of injection was performed without complication [Inflammation] : inflammation

## 2024-03-05 NOTE — ASSESSMENT
[FreeTextEntry1] : left knee pain. mri 2023 shows mmt and oa. history of bursectomy for infection by outside doc about 10 years ago. discussed all options including tka versus scope.  saw dr mauricio october 2023 for total knee consult and he did not think she was an optimal surgical candidate due to her medical issues/ risks.   patient continues to have pain and mechanical symptoms in the knee.  last csi on 1/23/24.    right knee pain with history of oa. stable with some pain.  left lateral elbow pain with likely tendonitis.  right shoulder pain. mri shows small full thickness cuff tear. csi 4/20/23.  stable  left shoulder pain. history of left shoulder cuff repair on 2/26/2016 done by me.  stable.  pmhx: depression, anxiety, copd, htn, now on blood thinners for PE. history of dvt LUE postop. recently had PE in feb 2023 - currently on eliquis  patient gets tramadol from pcp for chronic pain - discussed pain management consult with patient.

## 2024-03-11 LAB
ALBUMIN SERPL ELPH-MCNC: 4.5 G/DL
ALP BLD-CCNC: 119 U/L
ALT SERPL-CCNC: 18 U/L
ANION GAP SERPL CALC-SCNC: 11 MMOL/L
AST SERPL-CCNC: 18 U/L
BASOPHILS # BLD AUTO: 0.03 K/UL
BASOPHILS NFR BLD AUTO: 0.6 %
BILIRUB DIRECT SERPL-MCNC: 0.1 MG/DL
BILIRUB INDIRECT SERPL-MCNC: 0.2 MG/DL
BILIRUB SERPL-MCNC: 0.3 MG/DL
BUN SERPL-MCNC: 17 MG/DL
CALCIUM SERPL-MCNC: 9.7 MG/DL
CHLORIDE SERPL-SCNC: 107 MMOL/L
CHOLEST SERPL-MCNC: 173 MG/DL
CK SERPL-CCNC: 88 U/L
CO2 SERPL-SCNC: 25 MMOL/L
CREAT SERPL-MCNC: 0.81 MG/DL
EGFR: 77 ML/MIN/1.73M2
EOSINOPHIL # BLD AUTO: 0.09 K/UL
EOSINOPHIL NFR BLD AUTO: 1.9 %
FERRITIN SERPL-MCNC: 27 NG/ML
GLUCOSE SERPL-MCNC: 93 MG/DL
HCT VFR BLD CALC: 39.4 %
HDLC SERPL-MCNC: 102 MG/DL
HGB BLD-MCNC: 12.8 G/DL
IMM GRANULOCYTES NFR BLD AUTO: 0.2 %
IRON SATN MFR SERPL: 20 %
IRON SERPL-MCNC: 66 UG/DL
LDLC SERPL CALC-MCNC: 62 MG/DL
LYMPHOCYTES # BLD AUTO: 0.72 K/UL
LYMPHOCYTES NFR BLD AUTO: 15.6 %
MAN DIFF?: NORMAL
MCHC RBC-ENTMCNC: 29 PG
MCHC RBC-ENTMCNC: 32.5 GM/DL
MCV RBC AUTO: 89.1 FL
MONOCYTES # BLD AUTO: 0.33 K/UL
MONOCYTES NFR BLD AUTO: 7.1 %
NEUTROPHILS # BLD AUTO: 3.45 K/UL
NEUTROPHILS NFR BLD AUTO: 74.6 %
NONHDLC SERPL-MCNC: 71 MG/DL
NT-PROBNP SERPL-MCNC: 140 PG/ML
PLATELET # BLD AUTO: 235 K/UL
POTASSIUM SERPL-SCNC: 4 MMOL/L
PROT SERPL-MCNC: 6.8 G/DL
RBC # BLD: 4.42 M/UL
RBC # FLD: 12.6 %
SODIUM SERPL-SCNC: 142 MMOL/L
TIBC SERPL-MCNC: 330 UG/DL
TRIGL SERPL-MCNC: 48 MG/DL
UIBC SERPL-MCNC: 263 UG/DL
WBC # FLD AUTO: 4.63 K/UL

## 2024-03-12 ENCOUNTER — APPOINTMENT (OUTPATIENT)
Dept: ORTHOPEDIC SURGERY | Facility: CLINIC | Age: 74
End: 2024-03-12
Payer: MEDICARE

## 2024-03-12 DIAGNOSIS — M17.12 UNILATERAL PRIMARY OSTEOARTHRITIS, LEFT KNEE: ICD-10-CM

## 2024-03-12 PROCEDURE — 20610 DRAIN/INJ JOINT/BURSA W/O US: CPT | Mod: 50

## 2024-03-12 PROCEDURE — 99212 OFFICE O/P EST SF 10 MIN: CPT | Mod: 25

## 2024-03-12 NOTE — DISCUSSION/SUMMARY
[de-identified] : injections as below. follow up in 1 week.   Progress note completed by Denise Clark PA-C *Dr. Diaz - The MANUEL assigned on this date is under my supervision and saw this patient independently.  I have reviewed the note/plan and agree with the treatment provided.

## 2024-03-12 NOTE — PROCEDURE
[FreeTextEntry3] : Procedure Name: Euflexxa (Large Joint) Viscosupplementation Injection: X-ray evidence of Osteoarthritis on this or prior visit and Patient has tried OTC's including aspirin, Ibuprofen, Aleve etc or prescription NSAIDS, and/or exercises at home and/ or physical therapy without satisfactory response.  The risks, benefits, and alternatives to Viscosupplementation injection were explained in full to the patient. Risks outlined include but are not limited to infection, sepsis, bleeding, scarring, skin discoloration, temporary increase in pain, syncopal episode, failure to resolve symptoms, allergic reaction, and symptom recurrence. Signs and symptoms of infection reviewed and patient advised to call immediately for redness, fevers, and/or chills. Patient understood the risks. All questions were answered.   Oral informed consent was obtained.   Sterile technique was utilized for the procedure including the preparation of the solutions used for the injection. An injection of Euflexxa 2ml #2 was injected into BILATERAL KNEES.  Patient tolerated the procedure well. Advised to ice the injection site this evening.  Post Procedure Instructions: Patient was advised to call if redness, pain, or fever occur and apply ice for 15 min. out of every hour for the next 12-24 hours as tolerated. patient was advised to rest the joint(s) for 2 days.

## 2024-03-12 NOTE — PHYSICAL EXAM
[Left] : left knee [NL (0)] : extension 0 degrees [5___] : hamstring 5[unfilled]/5 [4___] : quadriceps 4[unfilled]/5 [Degenerative change] : Degenerative change [Right] : right knee [] : no guarding during exam [TWNoteComboBox7] : flexion 90 degrees

## 2024-03-12 NOTE — HISTORY OF PRESENT ILLNESS
[Gradual] : gradual [5] : 5 [Localized] : localized [Dull/Aching] : dull/aching [Sharp] : sharp [Stabbing] : stabbing [Rest] : rest [Constant] : constant [Exercising] : exercising [de-identified] : 8/16/22: right shoulder and bilateral knee pain back again. 9/13/22:  temp improvement with csi bilateral knees.  right shoulder pain still.  9/29/22:  bilateral knee pain.  1/24/23:  right shoulder pain.  4/20/23:  right shoulder pain persists.  5/23/23: recurrent bilateral knee pain. right shoulder improving. 8/4/23: L knee injection last visit helped temporarily. She reports her knee giving out while walking.  8/22/23:  left knee pain persists. 1/23/24:  follow up left knee.  saw dr mauricio october 2023 for left total knee and he did not think she was an optimal surgical candidate due to medical issues.  3/5/24:  follow up bilateral knees.  3/12/24:   continued pain.  no adverse reactions from first set.  [] : no [FreeTextEntry1] : left knee  [FreeTextEntry6] : bulking.  [de-identified] : none

## 2024-03-19 ENCOUNTER — APPOINTMENT (OUTPATIENT)
Dept: ORTHOPEDIC SURGERY | Facility: CLINIC | Age: 74
End: 2024-03-19

## 2024-03-22 ENCOUNTER — APPOINTMENT (OUTPATIENT)
Dept: PULMONOLOGY | Facility: CLINIC | Age: 74
End: 2024-03-22
Payer: MEDICARE

## 2024-03-22 VITALS — SYSTOLIC BLOOD PRESSURE: 116 MMHG | DIASTOLIC BLOOD PRESSURE: 70 MMHG | OXYGEN SATURATION: 96 % | HEART RATE: 66 BPM

## 2024-03-22 DIAGNOSIS — Z86.711 PERSONAL HISTORY OF PULMONARY EMBOLISM: ICD-10-CM

## 2024-03-22 DIAGNOSIS — J44.89 OTHER SPECIFIED CHRONIC OBSTRUCTIVE PULMONARY DISEASE: ICD-10-CM

## 2024-03-22 DIAGNOSIS — Z87.09 PERSONAL HISTORY OF OTHER DISEASES OF THE RESPIRATORY SYSTEM: ICD-10-CM

## 2024-03-22 DIAGNOSIS — Z86.69 PERSONAL HISTORY OF OTHER DISEASES OF THE NERVOUS SYSTEM AND SENSE ORGANS: ICD-10-CM

## 2024-03-22 PROCEDURE — 94729 DIFFUSING CAPACITY: CPT

## 2024-03-22 PROCEDURE — 94664 DEMO&/EVAL PT USE INHALER: CPT

## 2024-03-22 PROCEDURE — 99214 OFFICE O/P EST MOD 30 MIN: CPT | Mod: 25

## 2024-03-22 PROCEDURE — 95012 NITRIC OXIDE EXP GAS DETER: CPT

## 2024-03-22 PROCEDURE — ZZZZZ: CPT

## 2024-03-22 PROCEDURE — 94010 BREATHING CAPACITY TEST: CPT

## 2024-03-22 PROCEDURE — 94727 GAS DIL/WSHOT DETER LNG VOL: CPT

## 2024-03-22 RX ORDER — BUDESONIDE 0.5 MG/2ML
0.5 INHALANT ORAL TWICE DAILY
Qty: 6 | Refills: 3 | Status: ACTIVE | COMMUNITY
Start: 2024-03-22 | End: 1900-01-01

## 2024-03-22 RX ORDER — BLOOD-GLUCOSE METER
KIT MISCELLANEOUS
Qty: 1 | Refills: 0 | Status: ACTIVE | COMMUNITY
Start: 2024-03-22 | End: 1900-01-01

## 2024-03-22 NOTE — PHYSICAL EXAM
[No Acute Distress] : no acute distress [Normal Oropharynx] : normal oropharynx [II] : Mallampati Class: II [Normal Appearance] : normal appearance [Supple] : supple [Normal Rate/Rhythm] : normal rate/rhythm [No JVD] : no jvd [Normal S1, S2] : normal s1, s2 [No Resp Distress] : no resp distress [No Acc Muscle Use] : no acc muscle use [No Murmurs] : no murmurs [Normal Palpation] : normal palpation [Normal Rhythm and Effort] : normal rhythm and effort [Clear to Auscultation Bilaterally] : clear to auscultation bilaterally [Benign] : benign [Normal to Percussion] : normal to percussion [Soft] : soft [Not Tender] : not tender [No HSM] : no hsm [No Clubbing] : no clubbing [Normal Bowel Sounds] : normal bowel sounds [No Cyanosis] : no cyanosis [No Edema] : no edema [Normal Color/ Pigmentation] : normal color/ pigmentation [No Focal Deficits] : no focal deficits [Normal Affect] : normal affect [Oriented x3] : oriented x3 [TextBox_2] : Overweight [TextBox_99] : Ambulating with walker in a boot

## 2024-03-22 NOTE — HISTORY OF PRESENT ILLNESS
[Former] : former [TextBox_19] : Reported positive history obstructive sleep apnea, insomnia [TextBox_4] : 71-year-old female s/p removal 3 sutures s/p fall [facility] and  facial trauma reportes  scans  neg Nov 28 2023 cough chest  congestion  not comfortable TXED Nov 27 2023  medrol DOXY  ORTHO  noted on Trelegy  had recent colonoscpy and had trouble with anesthesia also had CT abdomen. repeat  CT PA per DR Nobles using cpap with o2 now Neg PE Clear lungs NOTED scheduled Colonoscopy -issue Eliquis   Is on CPAP Demonstrated desaturation on CPAP Still tired during the day Discussed with her the indication and clinical benefit for O2 2 L bleeding in CPAP set up  Post elevated CT PA with pos  PE  recurrent  placed on Eliquis protocol Remains on TRELEGY with no decline resp sxs exertional chest tightness Chief complaint cough chest congestion Not start any wheezing She states home O2 saturations ranging from 92 to 95 96% She does have history of pulmonary embolus currently on active therapy with Coumadin She is not describing any pleuritic chest pain Noted that a review of her prior PT/INR over the past several weeks including September 27, 2022 she was therapeutic with an INR of 2.6 Pression COPD rule out exacerbation flare Pulmonary embolism on active Coumadin (INR therapeutic Treatment Z-Munir Medrol Dosepak  since last visit required in hot weather required LIAN with chest congestion with relief No fever or sputum NO pleuritic CP  Sleep study -severe XENIA Pulmonary referral Additional information provided at today's visit including a more detailed chart review available with notes provided dating back to 2016 and Patient had an orthopedic surgical procedure which was complicated by a postoperative provoked pulmonary embolism This by definition and is the second provoked pulmonary embolism post COVID-19 infection Complicated pulmonary history nocturnal cough Chart reviewed History COPD  Patient is status post [facility] May 7, 2022 COVID positive CT scan of the chest demonstrated minimally of acute left lateral 5 through 7 ribs fracture within hematoma of the chest wall post fall on Also detected pulmonary embolism of the segmental arteries at the left lower lobe right lower lobe right upper lobe Ultrasound Doppler was negative for DVT Patient was initially sent home on Eliquis but it was too expensive and switched to Xarelto which patient states was giving her GI symptomatology with diarrhea and subsequently was switched to Coumadin Should be noted at today's visit the last INR on July 5, 2022 was 6.9 was advised to hold the Coumadin and then start 2 mg daily after 3 days with repeat evaluation Management per primary care physician Present is not complaining of any significant left-sided rib pain Cough additional complications include patient in a walking boot for Achilles tendinitis Also reports a prior history of pneumonia x2 remotely and bronchitis Does not describe any recent hospitalizations requiring steroids for COPD Has a subacute chronic cough Does not describe any wheezing chest congestion chest tightness at rest pleuritic chest pain at present hemoptysis No reported purulent sputum Patient states she was seen by cardiology reportedly had echocardiogram it was okay I did tell the cardiologist there was a component of exertional chest discomfort Patient received the COVID-vaccine Pfizer times 2+ a booster Reviewing her additional immunization profile she states flu shots are up-to-date She has previously received Prevnar 13 but not PCV 23 Says has a very minimal tobacco history discontinued all tobacco dating back approximately 50 years No reported other history of chemical toxic inhalational exposures

## 2024-03-22 NOTE — PROCEDURE
[FreeTextEntry1] : PFT 3/22/24 taylor nl  lung volumes nl  DLCO 64 % with mild loss fx  alveolar  capillary units HGB NIOX  27  ppp mild  airway inflammation 3/22/24  Panora 2/29/24  normal flows no decline  Taylor No BD 1/18/24  flow rates nl  noted  decline Asx  Chest x-ray PA lateral 12/4/2023 Cardiac size normal No parenchymal infiltrates pleural effusions with dominant pulmonary nodules No pneumothorax Soft tissue bony structures grossly unremarkable PFT December 4, 2023 Spirometry normal flow rates Ratio 77 Lung volumes unremarkable TLC 79% predicted Diffusion mild reduction 58% predicted with a loss of functioning alveolar capillary units   PFT indication shortness of breath 8/31/23 Spirometry normal Mild restriction TLC 76% predicted Diffusion low but normal range 70% predicted Comparison to prior studies demonstrates overall interval improvement of pulmonary physiology.  Chest x-ray PA lateral August 31, 2023 Chief complaint mucus Cardiac size normal Clear lung fields without parenchymal infiltrate pleural effusion or pulmonary nodules Soft tissue bony structures unremarkable  Taylor 7/31/23 Mild OAD  CPAP data Compliance  Usage 96 %  Hours > 4  CPAP 8 cm H20 AHI 9.0  inc CPAP 9 cm h20  TAYLOR June 2 2023 normal flow rates   no evidence hypercarbia  Repeat PFT post resolution of embolic disease on CT angiogram protocol April 4/17/23 Flow rates are normal FEV1 FVC ratio 78 Lung volumes demonstrate very mild reduction TLC 76% predicted No air-trapping Moderate reduction diffusion 61% predicted with a loss of functioning alveolar capillary units Overall stable pulmonary physiology without further decline  Pulmonary 6-minute walk exercise study February 27 2023 Indication pulmonary embolism shortness of breath atypical chest pain Baseline O2 saturation 97% Sigifredo desaturation to 92% Patient ambulated with walker only No evidence or indication for portable oxygen therapy protocol  PFT feb 9 2023  Flow rates taylor nl  TLC 74 %  DLCO  57 % with mod  loss fx  alveolar capillary units HGB 13.1 HGB 13.1  CPAP data compliance Data unavailable to January 1, 2023 Affect 100 symptoms 96.7% AHI 7.4 Encouraged increased usage  Panora 12/8/22 Mild dec FVC stable  CT chest November 7, 2022 CT angiogram Indication prior pulmonary embolism Comparison study of July 30, 2020  report no filling infection Negative pulmonary embolism no dissection Pulmonary arteries normal caliber 3 mm subpleural pulmonary nodule right upper lobe No evidence of a CT angiogram findings of a pulmonary embolism with resolution and no residual we will discontinue Coumadin treatment protocol  Sleep study August 15 August 72,022 Severe obstructive sleep apnea AHI 33 Positional component Insufficient nonsupine study time Present time less than 90% saturation on room air 13.4% Greater than 30 dB 63% Sigifredo desaturation 77.8% Impression severe obstructive sleep apnea Address treatment protocol focus primarily on CPAP On treatment with CPAP we will then require overnight oximetry to use to evaluate for the noted severe oxygen desaturation also can address an attended titration study in view of the severe oxygen desaturation as an alternative  Pulmonary 6 minute exercise study  8/4/2 ambulated with walker  RA O2 sat 96 %  No RA desaturation  PFT July 6, 2022 Mild reduction in flow rates FVC 70% predicted TLC normal range 82% predicted RV/TLC ratio 141% predicted Diffusion 79% predicted Hemoglobin 13.7 Impression air trapping Sawtooth pattern clinical correlation consistent with obstructive sleep apnea rule out noted that this patient has prior data dating back to February 4, 2007 there is some noted decline at the flow rates with stable diffusion and lung volumes  Chest x-ray PA lateral 7/6/22 Cardiac size normal Lung fields clear No parenchymal infiltrates pleural effusions or dominant pulmonary nodules No pneumothorax Pulmonary artery size grossly normal based on plain film  blood draw  HD FLU IM 11/2/23

## 2024-03-22 NOTE — REASON FOR VISIT
[Follow-Up] : a follow-up visit [COPD] : COPD [Pulmonary Embolism] : pulmonary embolism [TextBox_44] : XENIA on CPAP

## 2024-03-22 NOTE — REVIEW OF SYSTEMS
[Fatigue] : fatigue [GERD] : gerd [Depression] : depression [Negative] : Allergy/Immunology [Fever] : no fever [Anxiety] : no anxiety [Chills] : no chills [Thyroid Problem] : no thyroid problem [Diabetes] : no diabetes [TextBox_30] : HPI [TextBox_44] : Hypertension [TextBox_122] : Balance disorder [TextBox_83] : History of a renal cyst [TextBox_94] : Arthritis

## 2024-03-26 ENCOUNTER — APPOINTMENT (OUTPATIENT)
Dept: ORTHOPEDIC SURGERY | Facility: CLINIC | Age: 74
End: 2024-03-26
Payer: MEDICARE

## 2024-03-26 DIAGNOSIS — M17.12 UNILATERAL PRIMARY OSTEOARTHRITIS, LEFT KNEE: ICD-10-CM

## 2024-03-26 DIAGNOSIS — M17.11 UNILATERAL PRIMARY OSTEOARTHRITIS, RIGHT KNEE: ICD-10-CM

## 2024-03-26 PROCEDURE — 20611 DRAIN/INJ JOINT/BURSA W/US: CPT | Mod: 50

## 2024-03-26 NOTE — PROCEDURE
[FreeTextEntry3] : Procedure Name: Euflexxa (Large Joint) Viscosupplementation Injection: X-ray evidence of Osteoarthritis on this or prior visit and Patient has tried OTC's including aspirin, Ibuprofen, Aleve etc or prescription NSAIDS, and/or exercises at home and/ or physical therapy without satisfactory response.  The risks, benefits, and alternatives to Viscosupplementation injection were explained in full to the patient. Risks outlined include but are not limited to infection, sepsis, bleeding, scarring, skin discoloration, temporary increase in pain, syncopal episode, failure to resolve symptoms, allergic reaction, and symptom recurrence. Signs and symptoms of infection reviewed and patient advised to call immediately for redness, fevers, and/or chills. Patient understood the risks. All questions were answered.   Oral informed consent was obtained.   Sterile technique was utilized for the procedure including the preparation of the solutions used for the injection. An injection of Euflexxa 2ml #3 was injected into BILATERAL KNEES.  Patient tolerated the procedure well. Advised to ice the injection site this evening.  Post Procedure Instructions: Patient was advised to call if redness, pain, or fever occur and apply ice for 15 min. out of every hour for the next 12-24 hours as tolerated. patient was advised to rest the joint(s) for 2 days.

## 2024-03-26 NOTE — PHYSICAL EXAM
[Left] : left knee [NL (0)] : extension 0 degrees [4___] : quadriceps 4[unfilled]/5 [5___] : hamstring 5[unfilled]/5 [Degenerative change] : Degenerative change [Right] : right knee [] : no guarding during exam [TWNoteComboBox7] : flexion 90 degrees

## 2024-03-26 NOTE — DISCUSSION/SUMMARY
[de-identified] : Injections as below. Ice if sore. Return in 6 weeks if symptoms persist.  Progress note completed by CHIQUI Moreira under the direct supervision of Manjinder Diaz M.D.

## 2024-03-26 NOTE — HISTORY OF PRESENT ILLNESS
[Gradual] : gradual [5] : 5 [Dull/Aching] : dull/aching [Localized] : localized [Stabbing] : stabbing [Sharp] : sharp [Constant] : constant [Rest] : rest [Exercising] : exercising [Euflexxa] : Euflexxa [3] : 3 [de-identified] : 8/16/22: right shoulder and bilateral knee pain back again. 9/13/22:  temp improvement with csi bilateral knees.  right shoulder pain still.  9/29/22:  bilateral knee pain.  1/24/23:  right shoulder pain.  4/20/23:  right shoulder pain persists.  5/23/23: recurrent bilateral knee pain. right shoulder improving. 8/4/23: L knee injection last visit helped temporarily. She reports her knee giving out while walking.  8/22/23:  left knee pain persists. 1/23/24:  follow up left knee.  saw dr mauricio october 2023 for left total knee and he did not think she was an optimal surgical candidate due to medical issues.  3/5/24:  follow up bilateral knees.  3/12/24:   continued pain.  no adverse reactions from first set.  3/26/24:  bilateral knee pain persists. [] : no [FreeTextEntry6] : bulking.  [FreeTextEntry1] : left knee  [de-identified] : none

## 2024-03-27 ENCOUNTER — LABORATORY RESULT (OUTPATIENT)
Age: 74
End: 2024-03-27

## 2024-03-27 ENCOUNTER — APPOINTMENT (OUTPATIENT)
Dept: FAMILY MEDICINE | Facility: CLINIC | Age: 74
End: 2024-03-27
Payer: MEDICARE

## 2024-03-27 VITALS
DIASTOLIC BLOOD PRESSURE: 68 MMHG | TEMPERATURE: 97.1 F | HEIGHT: 62 IN | RESPIRATION RATE: 16 BRPM | BODY MASS INDEX: 37.17 KG/M2 | SYSTOLIC BLOOD PRESSURE: 128 MMHG | WEIGHT: 202 LBS | HEART RATE: 61 BPM | OXYGEN SATURATION: 97 %

## 2024-03-27 DIAGNOSIS — Z00.00 ENCOUNTER FOR GENERAL ADULT MEDICAL EXAMINATION W/OUT ABNORMAL FINDINGS: ICD-10-CM

## 2024-03-27 DIAGNOSIS — R25.1 TREMOR, UNSPECIFIED: ICD-10-CM

## 2024-03-27 DIAGNOSIS — Z79.01 LONG TERM (CURRENT) USE OF ANTICOAGULANTS: ICD-10-CM

## 2024-03-27 DIAGNOSIS — E66.9 OBESITY, UNSPECIFIED: ICD-10-CM

## 2024-03-27 DIAGNOSIS — E78.5 HYPERLIPIDEMIA, UNSPECIFIED: ICD-10-CM

## 2024-03-27 DIAGNOSIS — F32.A ANXIETY DISORDER, UNSPECIFIED: ICD-10-CM

## 2024-03-27 DIAGNOSIS — Z80.43 FAMILY HISTORY OF MALIGNANT NEOPLASM OF TESTIS: ICD-10-CM

## 2024-03-27 DIAGNOSIS — F41.9 ANXIETY DISORDER, UNSPECIFIED: ICD-10-CM

## 2024-03-27 PROCEDURE — G0439: CPT

## 2024-03-27 PROCEDURE — 36415 COLL VENOUS BLD VENIPUNCTURE: CPT

## 2024-03-27 NOTE — PLAN
[FreeTextEntry1] : [facility] referral placed try abilify/ozempic allowed to vent, emotional support provided cont rx chk bw cont pulm/ortho f/u mammo/DEXA

## 2024-03-27 NOTE — PHYSICAL EXAM
[No Acute Distress] : no acute distress [Well-Appearing] : well-appearing [Normal Sclera/Conjunctiva] : normal sclera/conjunctiva [Normal Outer Ear/Nose] : the outer ears and nose were normal in appearance [EOMI] : extraocular movements intact [PERRL] : pupils equal round and reactive to light [Normal Oropharynx] : the oropharynx was normal [No JVD] : no jugular venous distention [Normal TMs] : both tympanic membranes were normal [No Lymphadenopathy] : no lymphadenopathy [Supple] : supple [Thyroid Normal, No Nodules] : the thyroid was normal and there were no nodules present [No Respiratory Distress] : no respiratory distress  [Clear to Auscultation] : lungs were clear to auscultation bilaterally [No Accessory Muscle Use] : no accessory muscle use [Normal Rate] : normal rate  [Regular Rhythm] : with a regular rhythm [Normal S1, S2] : normal S1 and S2 [No Murmur] : no murmur heard [No Carotid Bruits] : no carotid bruits [Normal] : normal rate, regular rhythm, normal S1 and S2 and no murmur heard [No Abdominal Bruit] : a ~M bruit was not heard ~T in the abdomen [No Varicosities] : no varicosities [Pedal Pulses Present] : the pedal pulses are present [No Edema] : there was no peripheral edema [No Palpable Aorta] : no palpable aorta [No Extremity Clubbing/Cyanosis] : no extremity clubbing/cyanosis [Normal Appearance] : normal in appearance [No Nipple Discharge] : no nipple discharge [No Axillary Lymphadenopathy] : no axillary lymphadenopathy [Soft] : abdomen soft [Non Tender] : non-tender [Non-distended] : non-distended [No HSM] : no HSM [No Masses] : no abdominal mass palpated [Normal Bowel Sounds] : normal bowel sounds [Normal Supraclavicular Nodes] : no supraclavicular lymphadenopathy [Normal Axillary Nodes] : no axillary lymphadenopathy [Normal Posterior Cervical Nodes] : no posterior cervical lymphadenopathy [Normal Anterior Cervical Nodes] : no anterior cervical lymphadenopathy [No Spinal Tenderness] : no spinal tenderness [No CVA Tenderness] : no CVA  tenderness [No Joint Swelling] : no joint swelling [Grossly Normal Strength/Tone] : grossly normal strength/tone [No Rash] : no rash [Coordination Grossly Intact] : coordination grossly intact [No Focal Deficits] : no focal deficits [Normal Gait] : normal gait [Normal Affect] : the affect was normal [Alert and Oriented x3] : oriented to person, place, and time [Normal Insight/Judgement] : insight and judgment were intact [de-identified] : obese [de-identified] : arthritic knees; limited ROM to b/l knees; ambulates with walker; difficulty standing up from chair

## 2024-03-27 NOTE — HISTORY OF PRESENT ILLNESS
[FreeTextEntry1] : 73 year old female who presents today for a complete physical exam. c/o feeling depressed due to multiple medical conditions/limitations and lack of social support states unable to undergo B/L TKR due to high risk status and lack of support post op c/o tremors to hands R>L, started a few months ago states hasn't had mammogram done since she was 38 yo, not interested in repeating

## 2024-03-27 NOTE — HEALTH RISK ASSESSMENT
[Good] : ~his/her~  mood as  good [No] : In the past 12 months have you used drugs other than those required for medical reasons? No [Former] : Former [10-14] : 10-14 [> 15 Years] : > 15 Years [de-identified] : walking is limited due to knee pain and breathing [de-identified] : quit 30 yrs ago

## 2024-03-28 DIAGNOSIS — E55.9 VITAMIN D DEFICIENCY, UNSPECIFIED: ICD-10-CM

## 2024-03-28 DIAGNOSIS — N39.0 URINARY TRACT INFECTION, SITE NOT SPECIFIED: ICD-10-CM

## 2024-03-28 LAB
25(OH)D3 SERPL-MCNC: 28.9 NG/ML
ALBUMIN SERPL ELPH-MCNC: 4.8 G/DL
ALP BLD-CCNC: 133 U/L
ALT SERPL-CCNC: 24 U/L
ANION GAP SERPL CALC-SCNC: 13 MMOL/L
APPEARANCE: CLEAR
AST SERPL-CCNC: 28 U/L
BASOPHILS # BLD AUTO: 0.03 K/UL
BASOPHILS NFR BLD AUTO: 0.5 %
BILIRUB SERPL-MCNC: 0.4 MG/DL
BILIRUBIN URINE: NEGATIVE
BLOOD URINE: NEGATIVE
BUN SERPL-MCNC: 13 MG/DL
CALCIUM SERPL-MCNC: 9.9 MG/DL
CHLORIDE SERPL-SCNC: 102 MMOL/L
CHOLEST SERPL-MCNC: 182 MG/DL
CO2 SERPL-SCNC: 26 MMOL/L
COLOR: YELLOW
CREAT SERPL-MCNC: 1 MG/DL
EGFR: 59 ML/MIN/1.73M2
EOSINOPHIL # BLD AUTO: 0.08 K/UL
EOSINOPHIL NFR BLD AUTO: 1.3 %
ESTIMATED AVERAGE GLUCOSE: 105 MG/DL
GLUCOSE QUALITATIVE U: NEGATIVE MG/DL
GLUCOSE SERPL-MCNC: 96 MG/DL
HBA1C MFR BLD HPLC: 5.3 %
HCT VFR BLD CALC: 42.1 %
HDLC SERPL-MCNC: 105 MG/DL
HGB BLD-MCNC: 13.5 G/DL
IMM GRANULOCYTES NFR BLD AUTO: 0.5 %
KETONES URINE: NEGATIVE MG/DL
LDLC SERPL CALC-MCNC: 65 MG/DL
LEUKOCYTE ESTERASE URINE: ABNORMAL
LYMPHOCYTES # BLD AUTO: 0.65 K/UL
LYMPHOCYTES NFR BLD AUTO: 10.5 %
MAN DIFF?: NORMAL
MCHC RBC-ENTMCNC: 28.5 PG
MCHC RBC-ENTMCNC: 32.1 GM/DL
MCV RBC AUTO: 89 FL
MONOCYTES # BLD AUTO: 0.34 K/UL
MONOCYTES NFR BLD AUTO: 5.5 %
NEUTROPHILS # BLD AUTO: 5.09 K/UL
NEUTROPHILS NFR BLD AUTO: 81.7 %
NITRITE URINE: POSITIVE
NONHDLC SERPL-MCNC: 77 MG/DL
PH URINE: 7
PLATELET # BLD AUTO: 264 K/UL
POTASSIUM SERPL-SCNC: 4.6 MMOL/L
PROT SERPL-MCNC: 6.8 G/DL
PROTEIN URINE: NEGATIVE MG/DL
RBC # BLD: 4.73 M/UL
RBC # FLD: 13.2 %
SODIUM SERPL-SCNC: 141 MMOL/L
SPECIFIC GRAVITY URINE: 1.01
TRIGL SERPL-MCNC: 63 MG/DL
TSH SERPL-ACNC: 2.36 UIU/ML
UROBILINOGEN URINE: 0.2 MG/DL
WBC # FLD AUTO: 6.22 K/UL

## 2024-03-28 RX ORDER — CHROMIUM 200 MCG
25 MCG TABLET ORAL DAILY
Qty: 90 | Refills: 1 | Status: ACTIVE | COMMUNITY
Start: 2024-03-28 | End: 1900-01-01

## 2024-04-03 ENCOUNTER — TRANSCRIPTION ENCOUNTER (OUTPATIENT)
Age: 74
End: 2024-04-03

## 2024-04-09 ENCOUNTER — APPOINTMENT (OUTPATIENT)
Dept: FAMILY MEDICINE | Facility: CLINIC | Age: 74
End: 2024-04-09
Payer: MEDICARE

## 2024-04-09 VITALS
BODY MASS INDEX: 35.51 KG/M2 | TEMPERATURE: 98.4 F | HEART RATE: 73 BPM | SYSTOLIC BLOOD PRESSURE: 108 MMHG | DIASTOLIC BLOOD PRESSURE: 68 MMHG | WEIGHT: 193 LBS | OXYGEN SATURATION: 95 % | HEIGHT: 62 IN | RESPIRATION RATE: 16 BRPM

## 2024-04-09 DIAGNOSIS — K59.00 CONSTIPATION, UNSPECIFIED: ICD-10-CM

## 2024-04-09 DIAGNOSIS — J42 UNSPECIFIED CHRONIC BRONCHITIS: ICD-10-CM

## 2024-04-09 DIAGNOSIS — R39.9 UNSPECIFIED SYMPTOMS AND SIGNS INVOLVING THE GENITOURINARY SYSTEM: ICD-10-CM

## 2024-04-09 PROCEDURE — 99214 OFFICE O/P EST MOD 30 MIN: CPT

## 2024-04-09 RX ORDER — DICYCLOMINE HYDROCHLORIDE 10 MG/1
10 CAPSULE ORAL EVERY 6 HOURS
Qty: 360 | Refills: 1 | Status: ACTIVE | COMMUNITY
Start: 2023-10-10 | End: 1900-01-01

## 2024-04-09 RX ORDER — FLUTICASONE FUROATE, UMECLIDINIUM BROMIDE AND VILANTEROL TRIFENATATE 200; 62.5; 25 UG/1; UG/1; UG/1
200-62.5-25 POWDER RESPIRATORY (INHALATION)
Qty: 1 | Refills: 5 | Status: DISCONTINUED | COMMUNITY
Start: 2022-11-03 | End: 2024-04-09

## 2024-04-09 RX ORDER — NITROFURANTOIN (MONOHYDRATE/MACROCRYSTALS) 25; 75 MG/1; MG/1
100 CAPSULE ORAL TWICE DAILY
Qty: 14 | Refills: 0 | Status: DISCONTINUED | COMMUNITY
Start: 2024-03-28 | End: 2024-04-09

## 2024-04-09 NOTE — PLAN
[FreeTextEntry1] : 1. constipation  - likely caused urinary stasis - bladder irritation  - pain in RLQ - augmenting for diverticulitis   2. bronchitis  - Augmentin  - will hold off on steroid due to GI issues  - florastor, tessalremigio cullenes PRN cough   3. UTI symptoms  - suspect bladder irritation from distended colon constipation

## 2024-04-09 NOTE — REVIEW OF SYSTEMS
[Fever] : fever [Chills] : chills [Nasal Discharge] : nasal discharge [Wheezing] : wheezing [Cough] : cough [Abdominal Pain] : abdominal pain [Nausea] : nausea [Constipation] : constipation [Negative] : Heme/Lymph

## 2024-04-09 NOTE — HISTORY OF PRESENT ILLNESS
[FreeTextEntry1] : cough, abdominal pain and uti symptoms [de-identified] : 72 yo F presenting for productive cough for about 1 week. no fever chills.  c/o abdominal pain for about 5 days. no BM for 5 days. some mild nausea. took a laxative yesterday and was going all day. she reports mild improvement in her symptoms, but pain is still there. lower abdomen LLQ.  she reports productive cough and mild wheeze for several days. no dyspnea. covid negative. at home.  she also still having urinary symptoms.

## 2024-04-09 NOTE — PHYSICAL EXAM
[Normal] : affect was normal and insight and judgment were intact [de-identified] : mild wheeze upper lobes b/l

## 2024-04-12 ENCOUNTER — TRANSCRIPTION ENCOUNTER (OUTPATIENT)
Age: 74
End: 2024-04-12

## 2024-04-15 ENCOUNTER — TRANSCRIPTION ENCOUNTER (OUTPATIENT)
Age: 74
End: 2024-04-15

## 2024-05-06 RX ORDER — ATORVASTATIN CALCIUM 20 MG/1
20 TABLET, FILM COATED ORAL
Qty: 90 | Refills: 0 | Status: ACTIVE | COMMUNITY
Start: 2023-03-08 | End: 1900-01-01

## 2024-05-15 ENCOUNTER — RX RENEWAL (OUTPATIENT)
Age: 74
End: 2024-05-15

## 2024-05-15 RX ORDER — FORMOTEROL FUMARATE DIHYDRATE 0.02 MG/2ML
20 SOLUTION RESPIRATORY (INHALATION)
Qty: 120 | Refills: 0 | Status: ACTIVE | COMMUNITY
Start: 2024-03-22 | End: 1900-01-01

## 2024-05-21 ENCOUNTER — APPOINTMENT (OUTPATIENT)
Dept: FAMILY MEDICINE | Facility: CLINIC | Age: 74
End: 2024-05-21
Payer: MEDICARE

## 2024-05-21 VITALS
SYSTOLIC BLOOD PRESSURE: 123 MMHG | RESPIRATION RATE: 16 BRPM | TEMPERATURE: 97.6 F | OXYGEN SATURATION: 96 % | DIASTOLIC BLOOD PRESSURE: 75 MMHG | HEART RATE: 64 BPM | BODY MASS INDEX: 36.44 KG/M2 | HEIGHT: 62 IN | WEIGHT: 198 LBS

## 2024-05-21 LAB
BILIRUB UR QL STRIP: NEGATIVE
CLARITY UR: NORMAL
COLLECTION METHOD: NORMAL
GLUCOSE UR-MCNC: NEGATIVE
HCG UR QL: 0.2 EU/DL
HGB UR QL STRIP.AUTO: NORMAL
KETONES UR-MCNC: NEGATIVE
LEUKOCYTE ESTERASE UR QL STRIP: NORMAL
NITRITE UR QL STRIP: POSITIVE
PH UR STRIP: 5.5
PROT UR STRIP-MCNC: NEGATIVE
SP GR UR STRIP: >=1.03

## 2024-05-21 PROCEDURE — 81002 URINALYSIS NONAUTO W/O SCOPE: CPT

## 2024-05-21 PROCEDURE — 99213 OFFICE O/P EST LOW 20 MIN: CPT | Mod: 25

## 2024-05-21 RX ORDER — NITROFURANTOIN (MONOHYDRATE/MACROCRYSTALS) 25; 75 MG/1; MG/1
100 CAPSULE ORAL
Qty: 10 | Refills: 0 | Status: ACTIVE | COMMUNITY
Start: 2024-05-21 | End: 1900-01-01

## 2024-05-21 RX ORDER — ARIPIPRAZOLE 2 MG/1
2 TABLET ORAL AT BEDTIME
Qty: 30 | Refills: 0 | Status: DISCONTINUED | COMMUNITY
Start: 2024-03-27 | End: 2024-05-21

## 2024-05-21 RX ORDER — AMOXICILLIN AND CLAVULANATE POTASSIUM 875; 125 MG/1; MG/1
875-125 TABLET, COATED ORAL
Qty: 14 | Refills: 0 | Status: DISCONTINUED | COMMUNITY
Start: 2024-04-09 | End: 2024-05-21

## 2024-05-21 RX ORDER — SEMAGLUTIDE 0.68 MG/ML
2 INJECTION, SOLUTION SUBCUTANEOUS
Qty: 1 | Refills: 3 | Status: DISCONTINUED | COMMUNITY
Start: 2024-03-27 | End: 2024-05-21

## 2024-05-21 RX ORDER — METHOCARBAMOL 500 MG/1
500 TABLET, FILM COATED ORAL
Refills: 0 | Status: DISCONTINUED | COMMUNITY
End: 2024-05-21

## 2024-05-21 RX ORDER — AZITHROMYCIN 250 MG/1
250 TABLET, FILM COATED ORAL
Qty: 1 | Refills: 0 | Status: DISCONTINUED | COMMUNITY
Start: 2024-04-18 | End: 2024-05-21

## 2024-05-21 RX ORDER — BENZONATATE 100 MG/1
100 CAPSULE ORAL 3 TIMES DAILY
Qty: 30 | Refills: 0 | Status: DISCONTINUED | COMMUNITY
Start: 2024-04-09 | End: 2024-05-21

## 2024-05-21 RX ORDER — SACCHAROMYCES BOULARDII 50 MG
250 CAPSULE ORAL TWICE DAILY
Qty: 14 | Refills: 0 | Status: DISCONTINUED | COMMUNITY
Start: 2024-04-09 | End: 2024-05-21

## 2024-05-21 RX ORDER — METHYLPREDNISOLONE 4 MG/1
4 TABLET ORAL
Qty: 1 | Refills: 0 | Status: DISCONTINUED | COMMUNITY
Start: 2024-04-18 | End: 2024-05-21

## 2024-05-21 RX ORDER — OMEPRAZOLE 40 MG/1
40 CAPSULE, DELAYED RELEASE ORAL
Qty: 90 | Refills: 1 | Status: DISCONTINUED | COMMUNITY
Start: 2019-09-30 | End: 2024-05-21

## 2024-05-21 RX ORDER — BUSPIRONE HYDROCHLORIDE 30 MG/1
30 TABLET ORAL
Qty: 90 | Refills: 2 | Status: DISCONTINUED | COMMUNITY
Start: 2020-03-18 | End: 2024-05-21

## 2024-05-21 NOTE — HISTORY OF PRESENT ILLNESS
[FreeTextEntry8] : Here for UTI symptoms.  Burning started a few days ago. No foul odor.   Saw Gastro-started on medication to help with bowel movements.  Will be following up with via telephone.  IBS.  Dr. Corado.   New med from Gastro-will call us back with the name.    Never took abilify Never took ozempic

## 2024-05-21 NOTE — REVIEW OF SYSTEMS
[Dysuria] : dysuria [Fever] : no fever [Chills] : no chills [Wheezing] : no wheezing [Cough] : no cough [Vomiting] : no vomiting [Hematuria] : no hematuria [FreeTextEntry6] : Seeing Pulm tomorrow-COPD; used rescue inhaler 2 days ago; tight  [FreeTextEntry7] : stomach doing a little better-not as constipated; back and forth between diarrhea and constipation [FreeTextEntry8] : hesistancy today; burning for a few days;  Had seen Urology Dr. Lozada previously.   [FreeTextEntry9] : chronic pain back

## 2024-05-21 NOTE — PHYSICAL EXAM
[No Acute Distress] : no acute distress [Well-Appearing] : well-appearing [No Edema] : there was no peripheral edema [No Extremity Clubbing/Cyanosis] : no extremity clubbing/cyanosis [No CVA Tenderness] : no CVA  tenderness [Normal] : affect was normal and insight and judgment were intact [de-identified] : lumbar paraspinal tenderness

## 2024-05-21 NOTE — PLAN
[FreeTextEntry1] : UA in office with blood, nitrites and leuks. Will start antibiotics.  Advised to stay hydrated. If worsening symptoms- fever/chills/back pain develop, advised to seek immediate medical evaluation.  Urine culture sent.   Patient expressed understanding of plan. Discussed Uro re-eval if recurrent UTIs.

## 2024-05-22 ENCOUNTER — APPOINTMENT (OUTPATIENT)
Dept: PULMONOLOGY | Facility: CLINIC | Age: 74
End: 2024-05-22

## 2024-05-23 LAB — BACTERIA UR CULT: ABNORMAL

## 2024-05-28 ENCOUNTER — APPOINTMENT (OUTPATIENT)
Dept: FAMILY MEDICINE | Facility: CLINIC | Age: 74
End: 2024-05-28
Payer: MEDICARE

## 2024-05-28 VITALS
TEMPERATURE: 98.3 F | BODY MASS INDEX: 36.44 KG/M2 | HEART RATE: 62 BPM | DIASTOLIC BLOOD PRESSURE: 77 MMHG | OXYGEN SATURATION: 94 % | WEIGHT: 198 LBS | HEIGHT: 62 IN | SYSTOLIC BLOOD PRESSURE: 161 MMHG | RESPIRATION RATE: 16 BRPM

## 2024-05-28 DIAGNOSIS — R39.9 UNSPECIFIED SYMPTOMS AND SIGNS INVOLVING THE GENITOURINARY SYSTEM: ICD-10-CM

## 2024-05-28 DIAGNOSIS — N39.0 URINARY TRACT INFECTION, SITE NOT SPECIFIED: ICD-10-CM

## 2024-05-28 DIAGNOSIS — R10.9 UNSPECIFIED ABDOMINAL PAIN: ICD-10-CM

## 2024-05-28 DIAGNOSIS — N20.0 CALCULUS OF KIDNEY: ICD-10-CM

## 2024-05-28 DIAGNOSIS — I10 ESSENTIAL (PRIMARY) HYPERTENSION: ICD-10-CM

## 2024-05-28 DIAGNOSIS — M54.50 LOW BACK PAIN, UNSPECIFIED: ICD-10-CM

## 2024-05-28 LAB
BILIRUB UR QL STRIP: NEGATIVE
CLARITY UR: CLEAR
COLLECTION METHOD: NORMAL
GLUCOSE UR-MCNC: NEGATIVE
HCG UR QL: 0.2 EU/DL
HGB UR QL STRIP.AUTO: NEGATIVE
KETONES UR-MCNC: NEGATIVE
LEUKOCYTE ESTERASE UR QL STRIP: NEGATIVE
NITRITE UR QL STRIP: NEGATIVE
PH UR STRIP: 6
PROT UR STRIP-MCNC: NEGATIVE
SP GR UR STRIP: 1.02

## 2024-05-28 PROCEDURE — 99214 OFFICE O/P EST MOD 30 MIN: CPT

## 2024-05-28 RX ORDER — LUBIPROSTONE 8 UG/1
8 CAPSULE, GELATIN COATED ORAL
Refills: 0 | Status: ACTIVE | COMMUNITY

## 2024-05-28 RX ORDER — TRAMADOL HYDROCHLORIDE 50 MG/1
50 TABLET, COATED ORAL
Qty: 90 | Refills: 0 | Status: ACTIVE | COMMUNITY
Start: 2020-08-19 | End: 1900-01-01

## 2024-05-28 NOTE — PHYSICAL EXAM
[No Acute Distress] : no acute distress [Normal Sclera/Conjunctiva] : normal sclera/conjunctiva [EOMI] : extraocular movements intact [Normal Outer Ear/Nose] : the outer ears and nose were normal in appearance [No JVD] : no jugular venous distention [Normal] : normal rate, regular rhythm, normal S1 and S2 and no murmur heard [Soft] : abdomen soft [Coordination Grossly Intact] : coordination grossly intact [Normal Affect] : the affect was normal [Alert and Oriented x3] : oriented to person, place, and time [Normal Insight/Judgement] : insight and judgment were intact [de-identified] : obese [de-identified] : ruq tenderness

## 2024-05-29 LAB
ALBUMIN SERPL ELPH-MCNC: 4.5 G/DL
ALP BLD-CCNC: 123 U/L
ALT SERPL-CCNC: 19 U/L
ANION GAP SERPL CALC-SCNC: 12 MMOL/L
APPEARANCE: CLEAR
AST SERPL-CCNC: 22 U/L
BILIRUB SERPL-MCNC: 0.2 MG/DL
BILIRUBIN URINE: NEGATIVE
BLOOD URINE: NEGATIVE
BUN SERPL-MCNC: 18 MG/DL
CALCIUM SERPL-MCNC: 9.4 MG/DL
CHLORIDE SERPL-SCNC: 103 MMOL/L
CO2 SERPL-SCNC: 25 MMOL/L
COLOR: YELLOW
CREAT SERPL-MCNC: 0.96 MG/DL
EGFR: 62 ML/MIN/1.73M2
GLUCOSE QUALITATIVE U: NEGATIVE MG/DL
GLUCOSE SERPL-MCNC: 81 MG/DL
KETONES URINE: NEGATIVE MG/DL
LEUKOCYTE ESTERASE URINE: NEGATIVE
NITRITE URINE: NEGATIVE
PH URINE: 6
POTASSIUM SERPL-SCNC: 4.4 MMOL/L
PROT SERPL-MCNC: 6.4 G/DL
PROTEIN URINE: NEGATIVE MG/DL
SODIUM SERPL-SCNC: 140 MMOL/L
SPECIFIC GRAVITY URINE: 1.02
UROBILINOGEN URINE: 0.2 MG/DL

## 2024-05-30 ENCOUNTER — NON-APPOINTMENT (OUTPATIENT)
Age: 74
End: 2024-05-30

## 2024-06-13 RX ORDER — AMLODIPINE BESYLATE 5 MG/1
5 TABLET ORAL DAILY
Qty: 90 | Refills: 0 | Status: ACTIVE | COMMUNITY
Start: 2022-05-18 | End: 1900-01-01

## 2024-06-27 NOTE — HISTORY OF PRESENT ILLNESS
Caller: Maria D Fountain    Relationship: Self    Best call back number: [phone]     What was the call regarding: PATIENT IS COMING TO  PRINTED TEST RESULTS SOON TODAY 6/27/2024   [FreeTextEntry1] : 70 yo with uti-first noted June-rx cipro x 5 days then again in July-rx 5 days cipro\par July 20-uti again-rx bactrim x 7 days.\par Last c/s -staph aureus-?contam\par \par currently voiding with sl  less freq, urgency though dysuria is gone\par \par \par \par for renal u/s and repeat c/s\par

## 2024-07-10 ENCOUNTER — APPOINTMENT (OUTPATIENT)
Dept: PULMONOLOGY | Facility: CLINIC | Age: 74
End: 2024-07-10
Payer: MEDICARE

## 2024-07-10 VITALS — OXYGEN SATURATION: 94 % | HEART RATE: 68 BPM | DIASTOLIC BLOOD PRESSURE: 73 MMHG | SYSTOLIC BLOOD PRESSURE: 121 MMHG

## 2024-07-10 DIAGNOSIS — G47.33 OBSTRUCTIVE SLEEP APNEA (ADULT) (PEDIATRIC): ICD-10-CM

## 2024-07-10 DIAGNOSIS — Z79.01 LONG TERM (CURRENT) USE OF ANTICOAGULANTS: ICD-10-CM

## 2024-07-10 DIAGNOSIS — G47.34 IDIOPATHIC SLEEP RELATED NONOBSTRUCTIVE ALVEOLAR HYPOVENTILATION: ICD-10-CM

## 2024-07-10 DIAGNOSIS — Z86.711 PERSONAL HISTORY OF PULMONARY EMBOLISM: ICD-10-CM

## 2024-07-10 DIAGNOSIS — J44.89 OTHER SPECIFIED CHRONIC OBSTRUCTIVE PULMONARY DISEASE: ICD-10-CM

## 2024-07-10 DIAGNOSIS — J44.9 CHRONIC OBSTRUCTIVE PULMONARY DISEASE, UNSPECIFIED: ICD-10-CM

## 2024-07-10 DIAGNOSIS — I26.94 MULTI SUBSEGMENTAL PULMONARY EMBO: ICD-10-CM

## 2024-07-10 PROCEDURE — 94618 PULMONARY STRESS TESTING: CPT

## 2024-07-10 PROCEDURE — 99214 OFFICE O/P EST MOD 30 MIN: CPT | Mod: 25

## 2024-07-10 PROCEDURE — 94010 BREATHING CAPACITY TEST: CPT

## 2024-07-10 PROCEDURE — 95012 NITRIC OXIDE EXP GAS DETER: CPT

## 2024-09-30 RX ORDER — AZITHROMYCIN 250 MG/1
250 TABLET, FILM COATED ORAL
Qty: 1 | Refills: 0 | Status: ACTIVE | COMMUNITY
Start: 2024-09-30 | End: 1900-01-01

## 2024-09-30 RX ORDER — METHYLPREDNISOLONE 4 MG/1
4 TABLET ORAL
Qty: 1 | Refills: 0 | Status: ACTIVE | COMMUNITY
Start: 2024-09-30 | End: 1900-01-01

## 2024-10-03 ENCOUNTER — APPOINTMENT (OUTPATIENT)
Dept: INTERNAL MEDICINE | Facility: CLINIC | Age: 74
End: 2024-10-03
Payer: MEDICARE

## 2024-10-03 VITALS
HEIGHT: 62 IN | DIASTOLIC BLOOD PRESSURE: 80 MMHG | RESPIRATION RATE: 16 BRPM | BODY MASS INDEX: 37.17 KG/M2 | OXYGEN SATURATION: 95 % | WEIGHT: 202 LBS | HEART RATE: 74 BPM | SYSTOLIC BLOOD PRESSURE: 122 MMHG

## 2024-10-03 DIAGNOSIS — E78.5 HYPERLIPIDEMIA, UNSPECIFIED: ICD-10-CM

## 2024-10-03 DIAGNOSIS — J40 BRONCHITIS, NOT SPECIFIED AS ACUTE OR CHRONIC: ICD-10-CM

## 2024-10-03 DIAGNOSIS — R79.89 OTHER SPECIFIED ABNORMAL FINDINGS OF BLOOD CHEMISTRY: ICD-10-CM

## 2024-10-03 DIAGNOSIS — Z79.01 LONG TERM (CURRENT) USE OF ANTICOAGULANTS: ICD-10-CM

## 2024-10-03 PROCEDURE — 36415 COLL VENOUS BLD VENIPUNCTURE: CPT

## 2024-10-03 PROCEDURE — 99213 OFFICE O/P EST LOW 20 MIN: CPT

## 2024-10-03 RX ORDER — BENZONATATE 100 MG/1
100 CAPSULE ORAL
Qty: 10 | Refills: 0 | Status: ACTIVE | COMMUNITY
Start: 2024-10-03 | End: 1900-01-01

## 2024-10-07 PROBLEM — J40 BRONCHITIS: Status: ACTIVE | Noted: 2024-10-07

## 2024-10-07 NOTE — HISTORY OF PRESENT ILLNESS
[FreeTextEntry1] : F/U with anxiety,HTN,bronchitis [de-identified] : Pt reports being diagnosed with bronchitis by pulmonologist. On zpack and prednisone. Feeling better today.  No fever,chills.

## 2024-10-07 NOTE — HISTORY OF PRESENT ILLNESS
[FreeTextEntry1] : F/U with anxiety,HTN,bronchitis [de-identified] : Pt reports being diagnosed with bronchitis by pulmonologist. On zpack and prednisone. Feeling better today.  No fever,chills.

## 2024-10-07 NOTE — PHYSICAL EXAM
[No Acute Distress] : no acute distress [Well Nourished] : well nourished [No Respiratory Distress] : no respiratory distress  [No Accessory Muscle Use] : no accessory muscle use [Clear to Auscultation] : lungs were clear to auscultation bilaterally [Normal Rate] : normal rate  [Regular Rhythm] : with a regular rhythm [Normal S1, S2] : normal S1 and S2 [Alert and Oriented x3] : oriented to person, place, and time [Normal Mood] : the mood was normal

## 2024-10-09 ENCOUNTER — APPOINTMENT (OUTPATIENT)
Dept: PULMONOLOGY | Facility: CLINIC | Age: 74
End: 2024-10-09
Payer: MEDICARE

## 2024-10-09 VITALS — OXYGEN SATURATION: 95 % | DIASTOLIC BLOOD PRESSURE: 82 MMHG | HEART RATE: 66 BPM | SYSTOLIC BLOOD PRESSURE: 132 MMHG

## 2024-10-09 DIAGNOSIS — J42 UNSPECIFIED CHRONIC BRONCHITIS: ICD-10-CM

## 2024-10-09 PROCEDURE — 95012 NITRIC OXIDE EXP GAS DETER: CPT

## 2024-10-09 PROCEDURE — 99214 OFFICE O/P EST MOD 30 MIN: CPT | Mod: 25

## 2024-10-09 PROCEDURE — 94060 EVALUATION OF WHEEZING: CPT

## 2024-10-10 LAB
ALBUMIN SERPL ELPH-MCNC: 4.8 G/DL
ALP BLD-CCNC: 135 U/L
ALT SERPL-CCNC: 18 U/L
ANION GAP SERPL CALC-SCNC: 15 MMOL/L
AST SERPL-CCNC: 18 U/L
BILIRUB SERPL-MCNC: 0.2 MG/DL
BUN SERPL-MCNC: 18 MG/DL
CALCIUM SERPL-MCNC: 10 MG/DL
CHLORIDE SERPL-SCNC: 102 MMOL/L
CO2 SERPL-SCNC: 25 MMOL/L
CREAT SERPL-MCNC: 0.94 MG/DL
DEPRECATED D DIMER PPP IA-ACNC: <150 NG/ML DDU
EGFR: 64 ML/MIN/1.73M2
GLUCOSE SERPL-MCNC: 103 MG/DL
HCT VFR BLD CALC: 45.9 %
HGB BLD-MCNC: 14.7 G/DL
MCHC RBC-ENTMCNC: 28.9 PG
MCHC RBC-ENTMCNC: 32 GM/DL
MCV RBC AUTO: 90.4 FL
PLATELET # BLD AUTO: 279 K/UL
POTASSIUM SERPL-SCNC: 3.8 MMOL/L
PROT SERPL-MCNC: 7 G/DL
RBC # BLD: 5.08 M/UL
RBC # FLD: 12.5 %
SODIUM SERPL-SCNC: 142 MMOL/L
WBC # FLD AUTO: 8.07 K/UL

## 2024-11-06 ENCOUNTER — APPOINTMENT (OUTPATIENT)
Dept: PULMONOLOGY | Facility: CLINIC | Age: 74
End: 2024-11-06
Payer: MEDICARE

## 2024-11-06 VITALS — OXYGEN SATURATION: 97 % | HEART RATE: 71 BPM | SYSTOLIC BLOOD PRESSURE: 131 MMHG | DIASTOLIC BLOOD PRESSURE: 77 MMHG

## 2024-11-06 DIAGNOSIS — I26.94 MULT SUBSEGMENTAL THROM PULM EMBOLI W/O ACUTE COR PULMONALE: ICD-10-CM

## 2024-11-06 DIAGNOSIS — G47.34 IDIOPATHIC SLEEP RELATED NONOBSTRUCTIVE ALVEOLAR HYPOVENTILATION: ICD-10-CM

## 2024-11-06 DIAGNOSIS — Z79.01 LONG TERM (CURRENT) USE OF ANTICOAGULANTS: ICD-10-CM

## 2024-11-06 DIAGNOSIS — J42 UNSPECIFIED CHRONIC BRONCHITIS: ICD-10-CM

## 2024-11-06 DIAGNOSIS — J44.89 OTHER SPECIFIED CHRONIC OBSTRUCTIVE PULMONARY DISEASE: ICD-10-CM

## 2024-11-06 PROCEDURE — 99214 OFFICE O/P EST MOD 30 MIN: CPT | Mod: 25

## 2024-11-06 PROCEDURE — 94010 BREATHING CAPACITY TEST: CPT

## 2024-11-06 PROCEDURE — 94729 DIFFUSING CAPACITY: CPT

## 2024-11-06 PROCEDURE — 94727 GAS DIL/WSHOT DETER LNG VOL: CPT

## 2024-11-06 PROCEDURE — ZZZZZ: CPT

## 2024-11-06 PROCEDURE — 95012 NITRIC OXIDE EXP GAS DETER: CPT

## 2024-11-09 ENCOUNTER — RX RENEWAL (OUTPATIENT)
Age: 74
End: 2024-11-09

## 2024-11-09 RX ORDER — METHYLPREDNISOLONE 4 MG/1
4 TABLET ORAL
Qty: 21 | Refills: 0 | Status: ACTIVE | COMMUNITY
Start: 2024-11-06 | End: 1900-01-01

## 2024-11-13 ENCOUNTER — APPOINTMENT (OUTPATIENT)
Dept: FAMILY MEDICINE | Facility: CLINIC | Age: 74
End: 2024-11-13
Payer: MEDICARE

## 2024-11-13 VITALS
BODY MASS INDEX: 37.54 KG/M2 | SYSTOLIC BLOOD PRESSURE: 156 MMHG | OXYGEN SATURATION: 95 % | WEIGHT: 204 LBS | HEART RATE: 66 BPM | RESPIRATION RATE: 16 BRPM | TEMPERATURE: 97.7 F | DIASTOLIC BLOOD PRESSURE: 73 MMHG | HEIGHT: 62 IN

## 2024-11-13 DIAGNOSIS — E66.9 OBESITY, UNSPECIFIED: ICD-10-CM

## 2024-11-13 DIAGNOSIS — R07.81 PLEURODYNIA: ICD-10-CM

## 2024-11-13 DIAGNOSIS — G47.00 INSOMNIA, UNSPECIFIED: ICD-10-CM

## 2024-11-13 DIAGNOSIS — F32.A ANXIETY DISORDER, UNSPECIFIED: ICD-10-CM

## 2024-11-13 DIAGNOSIS — Z87.81 PERSONAL HISTORY OF (HEALED) TRAUMATIC FRACTURE: ICD-10-CM

## 2024-11-13 DIAGNOSIS — I10 ESSENTIAL (PRIMARY) HYPERTENSION: ICD-10-CM

## 2024-11-13 DIAGNOSIS — G47.19 OTHER HYPERSOMNIA: ICD-10-CM

## 2024-11-13 DIAGNOSIS — G47.30 SLEEP APNEA, UNSPECIFIED: ICD-10-CM

## 2024-11-13 DIAGNOSIS — F33.1 MAJOR DEPRESSIVE DISORDER, RECURRENT, MODERATE: ICD-10-CM

## 2024-11-13 DIAGNOSIS — Z71.89 OTHER SPECIFIED COUNSELING: ICD-10-CM

## 2024-11-13 DIAGNOSIS — Z86.2 PERSONAL HISTORY OF DISEASES OF THE BLOOD AND BLOOD-FORMING ORGANS AND CERTAIN DISORDERS INVOLVING THE IMMUNE MECHANISM: ICD-10-CM

## 2024-11-13 DIAGNOSIS — F41.9 ANXIETY DISORDER, UNSPECIFIED: ICD-10-CM

## 2024-11-13 PROCEDURE — G2211 COMPLEX E/M VISIT ADD ON: CPT

## 2024-11-13 PROCEDURE — 99215 OFFICE O/P EST HI 40 MIN: CPT

## 2024-11-13 RX ORDER — SEMAGLUTIDE 0.68 MG/ML
2 INJECTION, SOLUTION SUBCUTANEOUS
Qty: 1 | Refills: 1 | Status: ACTIVE | COMMUNITY
Start: 2024-11-13 | End: 1900-01-01

## 2024-11-13 RX ORDER — AMITRIPTYLINE HYDROCHLORIDE 10 MG/1
10 TABLET, FILM COATED ORAL AT BEDTIME
Qty: 30 | Refills: 1 | Status: ACTIVE | COMMUNITY
Start: 2024-11-13 | End: 1900-01-01

## 2024-11-25 NOTE — DISCUSSION/SUMMARY
Regular diet as tolerated  
[FreeTextEntry1] : COPD overlab asthma Stable to interval improvement of overall pulmonary physiology with chest x-ray imaging clear Will not change treatment protocol No strong indication for nebulizer treatment protocol Addressed need for controller therapy similar issues course Provided samples of air supra 2 puffs twice daily CHANGE BREZTRI 2 puffs BID and rinse Also provided sips of Eliquis to maintain long-term 5 mg twice daily Recurrent pulmonary embolism on Eliquis protocol now on 5 mg 1 tablet p.o. twice daily Discussed and detail with patient understanding that treatment protocol is long-term lifelong unless there is a impending issues bleeding etc. that require adjustment Cardiology follow-up reviewed Repeat CT PA per cardiology neg  and clear lungs Physical deconditioning  Obesity Is on CPAP Demonstrated desaturation on CPAP Still tired during the day Discussed with her the indication and clinical benefit for O2 2 L bleeding with CPAP  CT chest angiogram official report based Feb 2023 Positive pulmonary embolism Subsegmental filling defects right upper lobe right lower lobe left upper lobe new compared to the prior study No GI symptom or complaints Eliquis 5 mg twice daily Protocol is 10 mg twice daily for the first 7 days of therapy and then 5 mg 1 tablet p.o. twice daily  Also addressed the patient has a recurrent pulmonary embolism and therefore long-term therapy needs to be addressed at which she again is not inclined to want to complete but understands and is in agreement at this time  cardiology ECHO  recommended   COPD  Pos exertional dyspnea  r/o recurrent PE as etiology vs cardiac ischemic disease which was ruled out with nuclear stress test Obesity  Provoked pulmonary embolism dating back to early May 2022 with COVID-19 infection Gating issue based on additional historical data and chart review this is the second provoked pulmonary embolism with a prior dating back to 2016 BUT off AC Status post chest wall trauma with left rib fractures Component of obesity and physical deconditioning contributing to exertional dyspnea XENIA on CPAP Recommendations Long term AC NOAC sec  multiple PE Baseline D-dimer nl  RESMED CPAP  Setting CPAP 8.0 cm H20 Nas[employer] 9.0 cm H20 F/u Overnight Oximetry discussed Bleed O2  2 liters/minute  Coumadin OFF add hold BD and trial  HFN with preformist and budesonide demonstration of Neb use     
no

## 2024-12-04 DIAGNOSIS — J42 UNSPECIFIED CHRONIC BRONCHITIS: ICD-10-CM

## 2024-12-04 RX ORDER — DOXYCYCLINE HYCLATE 100 MG/1
100 TABLET ORAL
Qty: 14 | Refills: 0 | Status: ACTIVE | COMMUNITY
Start: 2024-12-04 | End: 1900-01-01

## 2024-12-04 RX ORDER — BENZONATATE 100 MG/1
100 CAPSULE ORAL
Qty: 30 | Refills: 3 | Status: ACTIVE | COMMUNITY
Start: 2024-12-04 | End: 1900-01-01

## 2024-12-18 ENCOUNTER — APPOINTMENT (OUTPATIENT)
Dept: PULMONOLOGY | Facility: CLINIC | Age: 74
End: 2024-12-18
Payer: MEDICARE

## 2024-12-18 VITALS — OXYGEN SATURATION: 90 % | DIASTOLIC BLOOD PRESSURE: 70 MMHG | SYSTOLIC BLOOD PRESSURE: 117 MMHG | HEART RATE: 67 BPM

## 2024-12-18 DIAGNOSIS — R05.3 CHRONIC COUGH: ICD-10-CM

## 2024-12-18 DIAGNOSIS — J44.9 CHRONIC OBSTRUCTIVE PULMONARY DISEASE, UNSPECIFIED: ICD-10-CM

## 2024-12-18 DIAGNOSIS — J42 UNSPECIFIED CHRONIC BRONCHITIS: ICD-10-CM

## 2024-12-18 DIAGNOSIS — S29.9XXA UNSPECIFIED INJURY OF THORAX, INITIAL ENCOUNTER: ICD-10-CM

## 2024-12-18 PROCEDURE — 99214 OFFICE O/P EST MOD 30 MIN: CPT | Mod: 25

## 2024-12-18 PROCEDURE — 95012 NITRIC OXIDE EXP GAS DETER: CPT

## 2024-12-18 PROCEDURE — 94060 EVALUATION OF WHEEZING: CPT

## 2024-12-18 PROCEDURE — 71100 X-RAY EXAM RIBS UNI 2 VIEWS: CPT

## 2024-12-18 PROCEDURE — 71046 X-RAY EXAM CHEST 2 VIEWS: CPT

## 2024-12-18 RX ORDER — PREDNISONE 10 MG/1
10 TABLET ORAL
Qty: 30 | Refills: 1 | Status: ACTIVE | COMMUNITY
Start: 2024-12-18 | End: 1900-01-01

## 2024-12-30 RX ORDER — DOXYCYCLINE HYCLATE 100 MG/1
100 TABLET ORAL
Qty: 14 | Refills: 0 | Status: ACTIVE | COMMUNITY
Start: 2024-12-30 | End: 1900-01-01

## 2025-01-06 ENCOUNTER — APPOINTMENT (OUTPATIENT)
Dept: PULMONOLOGY | Facility: CLINIC | Age: 75
End: 2025-01-06
Payer: MEDICARE

## 2025-01-06 VITALS — HEART RATE: 64 BPM | SYSTOLIC BLOOD PRESSURE: 134 MMHG | DIASTOLIC BLOOD PRESSURE: 76 MMHG | OXYGEN SATURATION: 96 %

## 2025-01-06 DIAGNOSIS — K21.9 GASTRO-ESOPHAGEAL REFLUX DISEASE W/OUT ESOPHAGITIS: ICD-10-CM

## 2025-01-06 DIAGNOSIS — Z79.01 LONG TERM (CURRENT) USE OF ANTICOAGULANTS: ICD-10-CM

## 2025-01-06 DIAGNOSIS — Z87.09 PERSONAL HISTORY OF OTHER DISEASES OF THE RESPIRATORY SYSTEM: ICD-10-CM

## 2025-01-06 DIAGNOSIS — J40 BRONCHITIS, NOT SPECIFIED AS ACUTE OR CHRONIC: ICD-10-CM

## 2025-01-06 DIAGNOSIS — G47.33 OBSTRUCTIVE SLEEP APNEA (ADULT) (PEDIATRIC): ICD-10-CM

## 2025-01-06 DIAGNOSIS — R05.3 CHRONIC COUGH: ICD-10-CM

## 2025-01-06 PROCEDURE — 94010 BREATHING CAPACITY TEST: CPT

## 2025-01-06 PROCEDURE — 95012 NITRIC OXIDE EXP GAS DETER: CPT

## 2025-01-06 PROCEDURE — 71046 X-RAY EXAM CHEST 2 VIEWS: CPT

## 2025-01-06 PROCEDURE — 99214 OFFICE O/P EST MOD 30 MIN: CPT | Mod: 25

## 2025-01-09 ENCOUNTER — APPOINTMENT (OUTPATIENT)
Dept: FAMILY MEDICINE | Facility: CLINIC | Age: 75
End: 2025-01-09
Payer: MEDICARE

## 2025-01-09 VITALS
HEIGHT: 62 IN | OXYGEN SATURATION: 96 % | SYSTOLIC BLOOD PRESSURE: 139 MMHG | WEIGHT: 212 LBS | DIASTOLIC BLOOD PRESSURE: 76 MMHG | HEART RATE: 78 BPM | RESPIRATION RATE: 16 BRPM | BODY MASS INDEX: 39.01 KG/M2 | TEMPERATURE: 98.6 F

## 2025-01-09 DIAGNOSIS — Z71.89 OTHER SPECIFIED COUNSELING: ICD-10-CM

## 2025-01-09 DIAGNOSIS — J34.9 UNSPECIFIED DISORDER OF NOSE AND NASAL SINUSES: ICD-10-CM

## 2025-01-09 DIAGNOSIS — Z86.711 PERSONAL HISTORY OF PULMONARY EMBOLISM: ICD-10-CM

## 2025-01-09 DIAGNOSIS — J44.89 OTHER SPECIFIED CHRONIC OBSTRUCTIVE PULMONARY DISEASE: ICD-10-CM

## 2025-01-09 DIAGNOSIS — J42 UNSPECIFIED CHRONIC BRONCHITIS: ICD-10-CM

## 2025-01-09 DIAGNOSIS — I10 ESSENTIAL (PRIMARY) HYPERTENSION: ICD-10-CM

## 2025-01-09 DIAGNOSIS — F41.9 ANXIETY DISORDER, UNSPECIFIED: ICD-10-CM

## 2025-01-09 DIAGNOSIS — F32.A ANXIETY DISORDER, UNSPECIFIED: ICD-10-CM

## 2025-01-09 PROCEDURE — 99215 OFFICE O/P EST HI 40 MIN: CPT

## 2025-01-09 RX ORDER — FLUTICASONE PROPIONATE AND SALMETEROL 250; 50 UG/1; UG/1
250-50 POWDER RESPIRATORY (INHALATION)
Qty: 3 | Refills: 1 | Status: ACTIVE | COMMUNITY
Start: 2025-01-09 | End: 1900-01-01

## 2025-01-09 RX ORDER — MONTELUKAST 10 MG/1
10 TABLET, FILM COATED ORAL
Qty: 1 | Refills: 2 | Status: ACTIVE | COMMUNITY
Start: 2025-01-09 | End: 1900-01-01

## 2025-01-09 RX ORDER — LORATADINE 10 MG/1
10 TABLET ORAL
Qty: 30 | Refills: 3 | Status: ACTIVE | COMMUNITY
Start: 2025-01-09 | End: 1900-01-01

## 2025-01-15 ENCOUNTER — NON-APPOINTMENT (OUTPATIENT)
Age: 75
End: 2025-01-15

## 2025-01-15 PROBLEM — J39.8 TRACHEOMALACIA: Status: ACTIVE | Noted: 2025-01-15

## 2025-01-20 ENCOUNTER — APPOINTMENT (OUTPATIENT)
Dept: PULMONOLOGY | Facility: CLINIC | Age: 75
End: 2025-01-20
Payer: MEDICARE

## 2025-01-20 VITALS — HEART RATE: 66 BPM | DIASTOLIC BLOOD PRESSURE: 78 MMHG | OXYGEN SATURATION: 92 % | SYSTOLIC BLOOD PRESSURE: 138 MMHG

## 2025-01-20 DIAGNOSIS — J39.8 OTHER SPECIFIED DISEASES OF UPPER RESPIRATORY TRACT: ICD-10-CM

## 2025-01-20 DIAGNOSIS — J42 UNSPECIFIED CHRONIC BRONCHITIS: ICD-10-CM

## 2025-01-20 DIAGNOSIS — G47.34 IDIOPATHIC SLEEP RELATED NONOBSTRUCTIVE ALVEOLAR HYPOVENTILATION: ICD-10-CM

## 2025-01-20 DIAGNOSIS — J44.89 OTHER SPECIFIED CHRONIC OBSTRUCTIVE PULMONARY DISEASE: ICD-10-CM

## 2025-01-20 DIAGNOSIS — I26.94 MULT SUBSEGMENTAL THROM PULM EMBOLI W/O ACUTE COR PULMONALE: ICD-10-CM

## 2025-01-20 DIAGNOSIS — G47.33 OBSTRUCTIVE SLEEP APNEA (ADULT) (PEDIATRIC): ICD-10-CM

## 2025-01-20 DIAGNOSIS — J44.9 CHRONIC OBSTRUCTIVE PULMONARY DISEASE, UNSPECIFIED: ICD-10-CM

## 2025-01-20 PROCEDURE — 99214 OFFICE O/P EST MOD 30 MIN: CPT

## 2025-01-20 PROCEDURE — G2211 COMPLEX E/M VISIT ADD ON: CPT

## 2025-01-21 ENCOUNTER — APPOINTMENT (OUTPATIENT)
Dept: RADIOLOGY | Facility: IMAGING CENTER | Age: 75
End: 2025-01-21

## 2025-01-30 ENCOUNTER — TRANSCRIPTION ENCOUNTER (OUTPATIENT)
Age: 75
End: 2025-01-30

## 2025-01-30 RX ORDER — CEFDINIR 300 MG/1
300 CAPSULE ORAL
Qty: 14 | Refills: 1 | Status: ACTIVE | COMMUNITY
Start: 2025-01-30 | End: 1900-01-01

## 2025-02-01 ENCOUNTER — NON-APPOINTMENT (OUTPATIENT)
Age: 75
End: 2025-02-01

## 2025-02-10 RX ORDER — TIZANIDINE 2 MG/1
2 TABLET ORAL
Qty: 50 | Refills: 0 | Status: DISCONTINUED | COMMUNITY
Start: 2025-02-10 | End: 2025-02-10

## 2025-02-11 ENCOUNTER — APPOINTMENT (OUTPATIENT)
Dept: FAMILY MEDICINE | Facility: CLINIC | Age: 75
End: 2025-02-11

## 2025-02-11 ENCOUNTER — APPOINTMENT (OUTPATIENT)
Dept: THORACIC SURGERY | Facility: CLINIC | Age: 75
End: 2025-02-11
Payer: MEDICARE

## 2025-02-11 VITALS
SYSTOLIC BLOOD PRESSURE: 129 MMHG | HEIGHT: 61 IN | WEIGHT: 212 LBS | OXYGEN SATURATION: 91 % | RESPIRATION RATE: 16 BRPM | BODY MASS INDEX: 40.02 KG/M2 | HEART RATE: 79 BPM | DIASTOLIC BLOOD PRESSURE: 84 MMHG

## 2025-02-11 DIAGNOSIS — K57.32 DIVERTICULITIS OF LARGE INTESTINE W/OUT PERFORATION OR ABSCESS W/OUT BLEEDING: ICD-10-CM

## 2025-02-11 PROCEDURE — 99205 OFFICE O/P NEW HI 60 MIN: CPT

## 2025-02-13 ENCOUNTER — OUTPATIENT (OUTPATIENT)
Dept: OUTPATIENT SERVICES | Facility: HOSPITAL | Age: 75
LOS: 1 days | End: 2025-02-13

## 2025-02-13 VITALS
WEIGHT: 212.08 LBS | SYSTOLIC BLOOD PRESSURE: 112 MMHG | TEMPERATURE: 98 F | HEIGHT: 61.5 IN | OXYGEN SATURATION: 97 % | RESPIRATION RATE: 16 BRPM | DIASTOLIC BLOOD PRESSURE: 76 MMHG | HEART RATE: 60 BPM

## 2025-02-13 DIAGNOSIS — I10 ESSENTIAL (PRIMARY) HYPERTENSION: ICD-10-CM

## 2025-02-13 DIAGNOSIS — Z90.710 ACQUIRED ABSENCE OF BOTH CERVIX AND UTERUS: Chronic | ICD-10-CM

## 2025-02-13 DIAGNOSIS — Z96.659 PRESENCE OF UNSPECIFIED ARTIFICIAL KNEE JOINT: Chronic | ICD-10-CM

## 2025-02-13 DIAGNOSIS — Z98.41 CATARACT EXTRACTION STATUS, RIGHT EYE: Chronic | ICD-10-CM

## 2025-02-13 DIAGNOSIS — J39.8 OTHER SPECIFIED DISEASES OF UPPER RESPIRATORY TRACT: ICD-10-CM

## 2025-02-13 DIAGNOSIS — I26.99 OTHER PULMONARY EMBOLISM WITHOUT ACUTE COR PULMONALE: ICD-10-CM

## 2025-02-13 DIAGNOSIS — Z98.890 OTHER SPECIFIED POSTPROCEDURAL STATES: Chronic | ICD-10-CM

## 2025-02-13 DIAGNOSIS — N81.10 CYSTOCELE, UNSPECIFIED: Chronic | ICD-10-CM

## 2025-02-13 DIAGNOSIS — K43.2 INCISIONAL HERNIA WITHOUT OBSTRUCTION OR GANGRENE: Chronic | ICD-10-CM

## 2025-02-13 DIAGNOSIS — M70.52 OTHER BURSITIS OF KNEE, LEFT KNEE: Chronic | ICD-10-CM

## 2025-02-13 DIAGNOSIS — Z98.51 TUBAL LIGATION STATUS: Chronic | ICD-10-CM

## 2025-02-13 DIAGNOSIS — G47.33 OBSTRUCTIVE SLEEP APNEA (ADULT) (PEDIATRIC): ICD-10-CM

## 2025-02-13 DIAGNOSIS — Z87.09 PERSONAL HISTORY OF OTHER DISEASES OF THE RESPIRATORY SYSTEM: ICD-10-CM

## 2025-02-13 DIAGNOSIS — Z90.49 ACQUIRED ABSENCE OF OTHER SPECIFIED PARTS OF DIGESTIVE TRACT: Chronic | ICD-10-CM

## 2025-02-13 DIAGNOSIS — F41.9 ANXIETY DISORDER, UNSPECIFIED: ICD-10-CM

## 2025-02-13 NOTE — H&P PST ADULT - PROBLEM SELECTOR PLAN 1
Now scheduled for awake bronchoscopy tentatively for 2/19/25.  Verbal and written pre-op instructions provided to patient. Patient verbalized understanding and will call surgeons office for revised instructions if surgery is rescheduled.

## 2025-02-13 NOTE — H&P PST ADULT - HISTORY OF PRESENT ILLNESS
74 year old female, former smoker (1PPD x 20 yrs, quit 1989), w/ hx of PE - 2018 & 2024  (on Eliquis), COPD, XENIA on CPAP, CAD, anxiety, depression, HTN, and cough who presented for TBM. Pt referred by Dr. Browning (pulm) to Dr Rogers for further evaluation. Now scheduled for awake bronchoscopy tentatively for 2/19/25.    ?PFT on 11/06/2024: FVC 91% FEV1 98% DLCO 63%  ?Spirometry on 1/06/2025: FVC 74% FEV1 69%  ?CT chest on 1/12/2025:  - Greater than 70% collapse of the trachea and bilateral main bronchi on expiration on series 13 representing tracheobronchomalacia.  - Mild bronchial wall thickening and cylindrical bronchiectasis.  - Mild linear and ground-glass opacities within the lung bases possibly dependent changes.

## 2025-02-13 NOTE — H&P PST ADULT - PROBLEM SELECTOR PLAN 3
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 No signs of DVT of upper arm. Suspect MSK-related pain, possible vasoocclusive crisis. Unlikely fracture. Will check labs, retic count, xrays, analgesia,
Patient uses a c-pap mask

## 2025-02-13 NOTE — H&P PST ADULT - NSICDXPASTMEDICALHX_GEN_ALL_CORE_FT
PAST MEDICAL HISTORY:  Cellulitis of left leg without foot     COPD (chronic obstructive pulmonary disease)     History of diverticulosis     HTN (hypertension)     Irritable bowel syndrome with diarrhea     Major depression     Obstructive sleep apnea     Pulmonary embolism      PAST MEDICAL HISTORY:  CAD (coronary artery disease)     Cellulitis of left leg without foot     COPD (chronic obstructive pulmonary disease)     History of diverticulosis     HTN (hypertension)     Irritable bowel syndrome with diarrhea     Major depression     Obstructive sleep apnea     Pulmonary embolism

## 2025-02-13 NOTE — H&P PST ADULT - NSICDXFAMILYHX_GEN_ALL_CORE_FT
FAMILY HISTORY:  Father  Still living? Unknown  Family history of lung cancer, Age at diagnosis: Age Unknown    Sibling  Still living? Unknown  Family history of acute myocardial infarction, Age at diagnosis: Age Unknown  Family history of hypertension, Age at diagnosis: Age Unknown

## 2025-02-13 NOTE — H&P PST ADULT - NEGATIVE ENMT SYMPTOMS
no hearing difficulty/no ear pain/no tinnitus/no vertigo/no nasal congestion/no nasal discharge/no post-nasal discharge

## 2025-02-13 NOTE — H&P PST ADULT - CARDIOVASCULAR COMMENTS
Hx Pulmonary Edema x 2 ( 2018 & 2024) - on Eliquis Hx Pulmonary Edema x 2 ( 2018 & 2024) - on Eliquis, hx CAD - no stents

## 2025-02-13 NOTE — H&P PST ADULT - PROBLEM SELECTOR PLAN 4
Medical record coordinator sent records to [facility] today. Patient instructed to take Sertraline, Xanax and Bupropion  with a sip of water on the morning of procedure.

## 2025-02-13 NOTE — H&P PST ADULT - NSICDXPASTSURGICALHX_GEN_ALL_CORE_FT
PAST SURGICAL HISTORY:  Bursitis of left knee, unspecified bursa     Female bladder prolapse     H/O abdominal hysterectomy     H/O tubal ligation     History of appendectomy     History of arthroscopy of left shoulder     History of D&C     History of incisional hernia repair     History of lithotripsy     Incisional hernia     Status post bilateral cataract extraction

## 2025-02-13 NOTE — H&P PST ADULT - RS GEN HX ROS MEA POS PC
cough hx COPD - no recent exacerbation or hospitalization -  stopped Trelegy 6 months ago , only on Albuterol PRN/cough

## 2025-02-14 ENCOUNTER — LABORATORY RESULT (OUTPATIENT)
Age: 75
End: 2025-02-14

## 2025-02-14 ENCOUNTER — APPOINTMENT (OUTPATIENT)
Dept: CARDIOLOGY | Facility: CLINIC | Age: 75
End: 2025-02-14
Payer: MEDICARE

## 2025-02-14 VITALS
WEIGHT: 211 LBS | BODY MASS INDEX: 39.84 KG/M2 | HEIGHT: 61 IN | HEART RATE: 72 BPM | RESPIRATION RATE: 17 BRPM | SYSTOLIC BLOOD PRESSURE: 132 MMHG | OXYGEN SATURATION: 92 % | TEMPERATURE: 98.7 F | DIASTOLIC BLOOD PRESSURE: 82 MMHG

## 2025-02-14 DIAGNOSIS — Z13.228 ENCOUNTER FOR SCREENING FOR OTHER METABOLIC DISORDERS: ICD-10-CM

## 2025-02-14 DIAGNOSIS — Z12.11 ENCOUNTER FOR SCREENING FOR MALIGNANT NEOPLASM OF COLON: ICD-10-CM

## 2025-02-14 DIAGNOSIS — R60.0 LOCALIZED EDEMA: ICD-10-CM

## 2025-02-14 DIAGNOSIS — G47.33 OBSTRUCTIVE SLEEP APNEA (ADULT) (PEDIATRIC): ICD-10-CM

## 2025-02-14 DIAGNOSIS — Z01.818 ENCOUNTER FOR OTHER PREPROCEDURAL EXAMINATION: ICD-10-CM

## 2025-02-14 DIAGNOSIS — I26.99 OTHER PULMONARY EMBOLISM W/OUT ACUTE COR PULMONALE: ICD-10-CM

## 2025-02-14 DIAGNOSIS — I10 ESSENTIAL (PRIMARY) HYPERTENSION: ICD-10-CM

## 2025-02-14 DIAGNOSIS — R06.02 SHORTNESS OF BREATH: ICD-10-CM

## 2025-02-14 DIAGNOSIS — J44.9 CHRONIC OBSTRUCTIVE PULMONARY DISEASE, UNSPECIFIED: ICD-10-CM

## 2025-02-14 DIAGNOSIS — R05.3 CHRONIC COUGH: ICD-10-CM

## 2025-02-14 DIAGNOSIS — E78.5 HYPERLIPIDEMIA, UNSPECIFIED: ICD-10-CM

## 2025-02-14 DIAGNOSIS — G89.29 OTHER CHRONIC PAIN: ICD-10-CM

## 2025-02-14 DIAGNOSIS — Z12.12 ENCOUNTER FOR SCREENING FOR MALIGNANT NEOPLASM OF COLON: ICD-10-CM

## 2025-02-14 DIAGNOSIS — D64.9 ANEMIA, UNSPECIFIED: ICD-10-CM

## 2025-02-14 DIAGNOSIS — J39.8 OTHER SPECIFIED DISEASES OF UPPER RESPIRATORY TRACT: ICD-10-CM

## 2025-02-14 DIAGNOSIS — R94.31 ABNORMAL ELECTROCARDIOGRAM [ECG] [EKG]: ICD-10-CM

## 2025-02-14 DIAGNOSIS — I25.10 ATHEROSCLEROTIC HEART DISEASE OF NATIVE CORONARY ARTERY W/OUT ANGINA PECTORIS: ICD-10-CM

## 2025-02-14 PROCEDURE — 99214 OFFICE O/P EST MOD 30 MIN: CPT

## 2025-02-14 PROCEDURE — G2211 COMPLEX E/M VISIT ADD ON: CPT

## 2025-02-14 PROCEDURE — 93000 ELECTROCARDIOGRAM COMPLETE: CPT | Mod: NC

## 2025-02-14 RX ORDER — METHOCARBAMOL 750 MG/1
750 TABLET, FILM COATED ORAL
Refills: 0 | Status: ACTIVE | COMMUNITY
Start: 2025-02-14

## 2025-02-14 RX ORDER — AMLODIPINE BESYLATE 5 MG/1
5 TABLET ORAL
Refills: 0 | Status: ACTIVE | COMMUNITY
Start: 2025-02-14

## 2025-02-14 RX ORDER — ATENOLOL 25 MG/1
25 TABLET ORAL
Refills: 0 | Status: ACTIVE | COMMUNITY
Start: 2025-02-14

## 2025-02-15 ENCOUNTER — APPOINTMENT (OUTPATIENT)
Dept: CARDIOLOGY | Facility: CLINIC | Age: 75
End: 2025-02-15
Payer: MEDICARE

## 2025-02-15 PROBLEM — I25.10 ATHEROSCLEROTIC HEART DISEASE OF NATIVE CORONARY ARTERY WITHOUT ANGINA PECTORIS: Chronic | Status: ACTIVE | Noted: 2025-02-13

## 2025-02-15 PROBLEM — G47.33 OBSTRUCTIVE SLEEP APNEA (ADULT) (PEDIATRIC): Chronic | Status: ACTIVE | Noted: 2025-02-13

## 2025-02-15 PROCEDURE — 93970 EXTREMITY STUDY: CPT

## 2025-02-15 PROCEDURE — 93306 TTE W/DOPPLER COMPLETE: CPT

## 2025-02-18 ENCOUNTER — NON-APPOINTMENT (OUTPATIENT)
Age: 75
End: 2025-02-18

## 2025-02-18 ENCOUNTER — APPOINTMENT (OUTPATIENT)
Dept: CARDIOLOGY | Facility: CLINIC | Age: 75
End: 2025-02-18
Payer: MEDICARE

## 2025-02-18 LAB
25(OH)D3 SERPL-MCNC: 30.9 NG/ML
ALBUMIN SERPL ELPH-MCNC: 4.5 G/DL
ALP BLD-CCNC: 148 U/L
ALT SERPL-CCNC: 16 U/L
ANION GAP SERPL CALC-SCNC: 15 MMOL/L
APPEARANCE: CLEAR
AST SERPL-CCNC: 21 U/L
BACTERIA: ABNORMAL /HPF
BASOPHILS # BLD AUTO: 0.03 K/UL
BASOPHILS NFR BLD AUTO: 0.4 %
BILIRUB SERPL-MCNC: 0.4 MG/DL
BILIRUBIN URINE: NEGATIVE
BLOOD URINE: NEGATIVE
BUN SERPL-MCNC: 16 MG/DL
CALCIUM SERPL-MCNC: 10.1 MG/DL
CAST: 0 /LPF
CHLORIDE SERPL-SCNC: 103 MMOL/L
CHOLEST SERPL-MCNC: 189 MG/DL
CK SERPL-CCNC: 55 U/L
CO2 SERPL-SCNC: 26 MMOL/L
COLOR: YELLOW
CREAT SERPL-MCNC: 1.04 MG/DL
EGFR: 56 ML/MIN/1.73M2
EOSINOPHIL # BLD AUTO: 0.12 K/UL
EOSINOPHIL NFR BLD AUTO: 1.7 %
EPITHELIAL CELLS: 6 /HPF
ESTIMATED AVERAGE GLUCOSE: 111 MG/DL
FERRITIN SERPL-MCNC: 83 NG/ML
FOLATE SERPL-MCNC: 19.5 NG/ML
GLUCOSE QUALITATIVE U: NEGATIVE MG/DL
GLUCOSE SERPL-MCNC: 105 MG/DL
HBA1C MFR BLD HPLC: 5.5 %
HCT VFR BLD CALC: 44.5 %
HDLC SERPL-MCNC: 93 MG/DL
HGB BLD-MCNC: 13.8 G/DL
IMM GRANULOCYTES NFR BLD AUTO: 0.1 %
IRON SATN MFR SERPL: 29 %
IRON SERPL-MCNC: 91 UG/DL
KETONES URINE: NEGATIVE MG/DL
LDLC SERPL CALC-MCNC: 79 MG/DL
LEUKOCYTE ESTERASE URINE: ABNORMAL
LYMPHOCYTES # BLD AUTO: 0.94 K/UL
LYMPHOCYTES NFR BLD AUTO: 13.3 %
MAGNESIUM SERPL-MCNC: 2.2 MG/DL
MAN DIFF?: NORMAL
MCHC RBC-ENTMCNC: 28.3 PG
MCHC RBC-ENTMCNC: 31 G/DL
MCV RBC AUTO: 91.4 FL
MICROSCOPIC-UA: NORMAL
MONOCYTES # BLD AUTO: 0.46 K/UL
MONOCYTES NFR BLD AUTO: 6.5 %
NEUTROPHILS # BLD AUTO: 5.49 K/UL
NEUTROPHILS NFR BLD AUTO: 78 %
NITRITE URINE: POSITIVE
NONHDLC SERPL-MCNC: 96 MG/DL
PH URINE: 6.5
PHOSPHATE SERPL-MCNC: 2.6 MG/DL
PLATELET # BLD AUTO: 394 K/UL
POTASSIUM SERPL-SCNC: 4 MMOL/L
PROT SERPL-MCNC: 6.7 G/DL
PROTEIN URINE: NEGATIVE MG/DL
RBC # BLD: 4.87 M/UL
RBC # FLD: 12.2 %
RED BLOOD CELLS URINE: 0 /HPF
SODIUM SERPL-SCNC: 144 MMOL/L
SPECIFIC GRAVITY URINE: 1.02
T4 FREE SERPL-MCNC: 1.1 NG/DL
TIBC SERPL-MCNC: 314 UG/DL
TRANSFERRIN SERPL-MCNC: 262 MG/DL
TRIGL SERPL-MCNC: 96 MG/DL
TSH SERPL-ACNC: 1.54 UIU/ML
UIBC SERPL-MCNC: 223 UG/DL
UROBILINOGEN URINE: 0.2 MG/DL
VIT B12 SERPL-MCNC: 659 PG/ML
WBC # FLD AUTO: 7.05 K/UL
WHITE BLOOD CELLS URINE: 1 /HPF

## 2025-02-18 PROCEDURE — 93015 CV STRESS TEST SUPVJ I&R: CPT

## 2025-02-18 PROCEDURE — 78452 HT MUSCLE IMAGE SPECT MULT: CPT

## 2025-02-18 PROCEDURE — A9500: CPT

## 2025-02-18 RX ORDER — REGADENOSON 0.08 MG/ML
0.4 INJECTION, SOLUTION INTRAVENOUS
Qty: 1 | Refills: 0 | Status: COMPLETED | OUTPATIENT
Start: 2025-02-18

## 2025-02-18 RX ADMIN — REGADENOSON 4 MG/5ML: 0.08 INJECTION, SOLUTION INTRAVENOUS at 00:00

## 2025-02-18 NOTE — ASU PATIENT PROFILE, ADULT - NSICDXPASTMEDICALHX_GEN_ALL_CORE_FT
PAST MEDICAL HISTORY:  CAD (coronary artery disease)     Cellulitis of left leg without foot     COPD (chronic obstructive pulmonary disease)     History of diverticulosis     HTN (hypertension)     Irritable bowel syndrome with diarrhea     Major depression     Obstructive sleep apnea     Pulmonary embolism

## 2025-02-18 NOTE — ASU PATIENT PROFILE, ADULT - MEDICATIONS TO TAKE
took Norvasc, Atenolol, Seroquel and bupropion with sip of water at 9am took Norvasc, Atenolol, Sertraline and bupropion with sip of water at 9am

## 2025-02-18 NOTE — ASU PATIENT PROFILE, ADULT - CAREGIVER
Recommendedshetryandloseweight watch portion and type of food that she eats.   Increase her exercise No

## 2025-02-19 ENCOUNTER — APPOINTMENT (OUTPATIENT)
Dept: THORACIC SURGERY | Facility: HOSPITAL | Age: 75
End: 2025-02-19

## 2025-02-19 ENCOUNTER — TRANSCRIPTION ENCOUNTER (OUTPATIENT)
Age: 75
End: 2025-02-19

## 2025-02-19 ENCOUNTER — OUTPATIENT (OUTPATIENT)
Dept: OUTPATIENT SERVICES | Facility: HOSPITAL | Age: 75
LOS: 1 days | Discharge: HOME | End: 2025-02-19
Payer: MEDICARE

## 2025-02-19 VITALS
DIASTOLIC BLOOD PRESSURE: 69 MMHG | HEART RATE: 62 BPM | TEMPERATURE: 98 F | RESPIRATION RATE: 16 BRPM | HEIGHT: 61.5 IN | OXYGEN SATURATION: 96 % | WEIGHT: 212.08 LBS | SYSTOLIC BLOOD PRESSURE: 116 MMHG

## 2025-02-19 VITALS
DIASTOLIC BLOOD PRESSURE: 70 MMHG | SYSTOLIC BLOOD PRESSURE: 123 MMHG | OXYGEN SATURATION: 96 % | HEART RATE: 62 BPM | RESPIRATION RATE: 16 BRPM

## 2025-02-19 DIAGNOSIS — Z90.49 ACQUIRED ABSENCE OF OTHER SPECIFIED PARTS OF DIGESTIVE TRACT: Chronic | ICD-10-CM

## 2025-02-19 DIAGNOSIS — M70.52 OTHER BURSITIS OF KNEE, LEFT KNEE: Chronic | ICD-10-CM

## 2025-02-19 DIAGNOSIS — K43.2 INCISIONAL HERNIA WITHOUT OBSTRUCTION OR GANGRENE: Chronic | ICD-10-CM

## 2025-02-19 DIAGNOSIS — Z98.890 OTHER SPECIFIED POSTPROCEDURAL STATES: Chronic | ICD-10-CM

## 2025-02-19 DIAGNOSIS — Z90.710 ACQUIRED ABSENCE OF BOTH CERVIX AND UTERUS: Chronic | ICD-10-CM

## 2025-02-19 DIAGNOSIS — Z98.41 CATARACT EXTRACTION STATUS, RIGHT EYE: Chronic | ICD-10-CM

## 2025-02-19 DIAGNOSIS — N81.10 CYSTOCELE, UNSPECIFIED: Chronic | ICD-10-CM

## 2025-02-19 DIAGNOSIS — Z98.51 TUBAL LIGATION STATUS: Chronic | ICD-10-CM

## 2025-02-19 DIAGNOSIS — J39.8 OTHER SPECIFIED DISEASES OF UPPER RESPIRATORY TRACT: ICD-10-CM

## 2025-02-19 PROCEDURE — 31622 DX BRONCHOSCOPE/WASH: CPT

## 2025-02-19 RX ORDER — LIDOCAINE HCL/PF 10 MG/ML
4 VIAL (ML) INJECTION ONCE
Refills: 0 | Status: DISCONTINUED | OUTPATIENT
Start: 2025-02-19 | End: 2025-02-19

## 2025-02-19 RX ORDER — ASPIRIN 81 MG/1
1 TABLET, COATED ORAL
Refills: 0 | DISCHARGE

## 2025-02-19 RX ORDER — ATORVASTATIN CALCIUM 80 MG/1
1 TABLET, FILM COATED ORAL
Refills: 0 | DISCHARGE

## 2025-02-19 RX ORDER — DICYCLOMINE HCL 20 MG
1 TABLET ORAL
Refills: 0 | DISCHARGE

## 2025-02-19 RX ORDER — ATENOLOL 50 MG
1 TABLET ORAL
Refills: 0 | DISCHARGE

## 2025-02-19 RX ORDER — APIXABAN 5 MG/1
1 TABLET, FILM COATED ORAL
Refills: 0 | DISCHARGE

## 2025-02-19 RX ORDER — ALPRAZOLAM 2 MG
1 TABLET ORAL
Refills: 0 | DISCHARGE

## 2025-02-19 RX ORDER — SODIUM CHLORIDE 9 G/1000ML
1000 INJECTION, SOLUTION INTRAVENOUS
Refills: 0 | Status: DISCONTINUED | OUTPATIENT
Start: 2025-02-19 | End: 2025-02-19

## 2025-02-19 RX ORDER — AMLODIPINE BESYLATE 5 MG
1 TABLET ORAL
Refills: 0 | DISCHARGE

## 2025-02-19 RX ORDER — DIPHENHYDRAMINE HCL 25 MG
1 CAPSULE ORAL
Refills: 0 | DISCHARGE

## 2025-02-19 RX ORDER — TRAZODONE HCL 100 MG
1 TABLET ORAL
Refills: 0 | DISCHARGE

## 2025-02-19 RX ORDER — BUPROPION HYDROCHLORIDE 150 MG/1
1 TABLET, EXTENDED RELEASE ORAL
Refills: 0 | DISCHARGE

## 2025-02-19 RX ORDER — METHOCARBAMOL 500 MG
1 TABLET ORAL
Refills: 0 | DISCHARGE

## 2025-02-19 RX ORDER — IPRATROPIUM BROMIDE 42 UG/1
1 SPRAY, METERED NASAL
Refills: 0 | DISCHARGE

## 2025-02-19 RX ORDER — TRAMADOL HYDROCHLORIDE 100 MG/1
1 TABLET, EXTENDED RELEASE ORAL
Refills: 0 | DISCHARGE

## 2025-02-19 RX ORDER — SERTRALINE HCL 100 MG
2 TABLET ORAL
Refills: 0 | DISCHARGE

## 2025-02-19 NOTE — ASU DISCHARGE PLAN (ADULT/PEDIATRIC) - COMMENTS
No need to follow up with Dr. Rogers. Please follow up with your pulmonologist, you will need to call and make an appointment. No need to follow up with Dr. Rogers. Please follow up with your pulmonologist, you will need to call and make an appointment.  Please avoid any food with sharp edges or spicy foods for today only. If you cough up blood tinged sputum it is normal for today and tomorrow. If you cough up more than a than a table spoon of bright red blood, call MD and return to ER

## 2025-02-19 NOTE — ASU DISCHARGE PLAN (ADULT/PEDIATRIC) - FINANCIAL ASSISTANCE
[facility] provides services at a reduced cost to those who are determined to be eligible through [facility]’s financial assistance program. Information regarding [facility]’s financial assistance program can be found by going to [email or web address] or by calling [phone].

## 2025-02-19 NOTE — ASU DISCHARGE PLAN (ADULT/PEDIATRIC) - CALL YOUR DOCTOR IF YOU HAVE ANY OF THE FOLLOWING:
Swelling that gets worse/Fever greater than (need to indicate Fahrenheit or Celsius)/Inability to tolerate liquids or foods/Increased irritability or sluggishness

## 2025-02-19 NOTE — ASU DISCHARGE PLAN (ADULT/PEDIATRIC) - NS MD DC FALL RISK RISK
For information on Fall & Injury Prevention, visit: [email or web address] OR  [email or web address] OR  https://www.cdc.gov/steadi/patient.html

## 2025-02-19 NOTE — ASU DISCHARGE PLAN (ADULT/PEDIATRIC) - ASU DC SPECIAL INSTRUCTIONSFT
Please do not eat anything until 3:45pm. Your vocal cords were anesthestized and if you eat before 3:45pm you are at risk for aspiration.

## 2025-02-19 NOTE — BRIEF OPERATIVE NOTE - COMMENTS
I, Antelmo Osborn PA-C provided direct first assist support to the surgeon during this surgical procedure. My involvement included positioning, prepping and draping the patient prior to surgery, ensuring clear visibility and exposure for the surgeon by using instruments such as retractors and suction, closing surgical incisions and dressing wounds. As well as other tasks as directed by the surgeon.

## 2025-02-25 NOTE — ASU PATIENT PROFILE, ADULT - NS PRO PASSIVE SMOKE EXP
Lexiscan MPI. The test was explained to the patient and the patient agreed to proceed with his test. Lexiscan stress test was completed and the patient tolerated well. The patient was instructed to follow up with his MD for his results. The patient was also instructed to resume his diet and medication regimen as indicated by his MD. The patient verbalized understanding. The patient was escorted to the [facility] department for his post stress images. Refer to the Lexiscan MPI report.   No

## 2025-02-26 ENCOUNTER — APPOINTMENT (OUTPATIENT)
Dept: PULMONOLOGY | Facility: CLINIC | Age: 75
End: 2025-02-26
Payer: MEDICARE

## 2025-02-26 VITALS — SYSTOLIC BLOOD PRESSURE: 109 MMHG | HEART RATE: 69 BPM | DIASTOLIC BLOOD PRESSURE: 67 MMHG | OXYGEN SATURATION: 94 %

## 2025-02-26 DIAGNOSIS — J44.89 OTHER SPECIFIED CHRONIC OBSTRUCTIVE PULMONARY DISEASE: ICD-10-CM

## 2025-02-26 DIAGNOSIS — J44.9 CHRONIC OBSTRUCTIVE PULMONARY DISEASE, UNSPECIFIED: ICD-10-CM

## 2025-02-26 DIAGNOSIS — R05.3 CHRONIC COUGH: ICD-10-CM

## 2025-02-26 DIAGNOSIS — Z79.01 LONG TERM (CURRENT) USE OF ANTICOAGULANTS: ICD-10-CM

## 2025-02-26 DIAGNOSIS — J42 UNSPECIFIED CHRONIC BRONCHITIS: ICD-10-CM

## 2025-02-26 PROCEDURE — 95012 NITRIC OXIDE EXP GAS DETER: CPT

## 2025-02-26 PROCEDURE — 99214 OFFICE O/P EST MOD 30 MIN: CPT | Mod: 25

## 2025-02-26 PROCEDURE — 94010 BREATHING CAPACITY TEST: CPT

## 2025-02-26 RX ORDER — FAMOTIDINE 40 MG/1
40 TABLET, FILM COATED ORAL
Qty: 1 | Refills: 3 | Status: ACTIVE | COMMUNITY
Start: 2025-02-26 | End: 1900-01-01

## 2025-03-13 ENCOUNTER — APPOINTMENT (OUTPATIENT)
Dept: FAMILY MEDICINE | Facility: CLINIC | Age: 75
End: 2025-03-13

## 2025-03-13 VITALS
TEMPERATURE: 98.5 F | OXYGEN SATURATION: 95 % | BODY MASS INDEX: 39.65 KG/M2 | RESPIRATION RATE: 17 BRPM | WEIGHT: 210 LBS | DIASTOLIC BLOOD PRESSURE: 60 MMHG | HEIGHT: 61 IN | HEART RATE: 60 BPM | SYSTOLIC BLOOD PRESSURE: 122 MMHG

## 2025-03-13 DIAGNOSIS — E66.9 OBESITY, UNSPECIFIED: ICD-10-CM

## 2025-03-13 DIAGNOSIS — J44.9 CHRONIC OBSTRUCTIVE PULMONARY DISEASE, UNSPECIFIED: ICD-10-CM

## 2025-03-13 DIAGNOSIS — F41.9 ANXIETY DISORDER, UNSPECIFIED: ICD-10-CM

## 2025-03-13 DIAGNOSIS — I10 ESSENTIAL (PRIMARY) HYPERTENSION: ICD-10-CM

## 2025-03-13 DIAGNOSIS — Z71.89 OTHER SPECIFIED COUNSELING: ICD-10-CM

## 2025-03-13 DIAGNOSIS — J39.8 OTHER SPECIFIED DISEASES OF UPPER RESPIRATORY TRACT: ICD-10-CM

## 2025-03-13 RX ORDER — BUDESONIDE, GLYCOPYRROLATE, AND FORMOTEROL FUMARATE 160; 9; 4.8 UG/1; UG/1; UG/1
160-9-4.8 AEROSOL, METERED RESPIRATORY (INHALATION)
Refills: 0 | Status: ACTIVE | COMMUNITY
Start: 2025-03-13

## 2025-03-13 RX ORDER — BUDESONIDE 0.5 MG/2ML
0.5 INHALANT ORAL
Refills: 0 | Status: ACTIVE | COMMUNITY
Start: 2025-03-13

## 2025-03-13 RX ORDER — FORMOTEROL FUMARATE DIHYDRATE 0.02 MG/2ML
20 SOLUTION RESPIRATORY (INHALATION)
Refills: 0 | Status: ACTIVE | COMMUNITY
Start: 2025-03-13

## 2025-03-13 RX ORDER — REVEFENACIN 175 UG/3ML
175 SOLUTION RESPIRATORY (INHALATION)
Refills: 0 | Status: ACTIVE | COMMUNITY
Start: 2025-03-13

## 2025-03-20 RX ORDER — SEMAGLUTIDE 0.68 MG/ML
2 INJECTION, SOLUTION SUBCUTANEOUS
Qty: 1 | Refills: 1 | Status: ACTIVE | COMMUNITY
Start: 2025-03-13

## 2025-03-26 ENCOUNTER — APPOINTMENT (OUTPATIENT)
Dept: PULMONOLOGY | Facility: CLINIC | Age: 75
End: 2025-03-26
Payer: MEDICARE

## 2025-03-26 VITALS — HEART RATE: 58 BPM | OXYGEN SATURATION: 93 % | DIASTOLIC BLOOD PRESSURE: 73 MMHG | SYSTOLIC BLOOD PRESSURE: 123 MMHG

## 2025-03-26 DIAGNOSIS — I26.94 MULT SUBSEGMENTAL THROM PULM EMBOLI W/O ACUTE COR PULMONALE: ICD-10-CM

## 2025-03-26 DIAGNOSIS — J39.8 OTHER SPECIFIED DISEASES OF UPPER RESPIRATORY TRACT: ICD-10-CM

## 2025-03-26 DIAGNOSIS — Z79.01 LONG TERM (CURRENT) USE OF ANTICOAGULANTS: ICD-10-CM

## 2025-03-26 DIAGNOSIS — G47.34 IDIOPATHIC SLEEP RELATED NONOBSTRUCTIVE ALVEOLAR HYPOVENTILATION: ICD-10-CM

## 2025-03-26 DIAGNOSIS — G47.33 OBSTRUCTIVE SLEEP APNEA (ADULT) (PEDIATRIC): ICD-10-CM

## 2025-03-26 DIAGNOSIS — J44.89 OTHER SPECIFIED CHRONIC OBSTRUCTIVE PULMONARY DISEASE: ICD-10-CM

## 2025-03-26 DIAGNOSIS — J42 UNSPECIFIED CHRONIC BRONCHITIS: ICD-10-CM

## 2025-03-26 PROCEDURE — 94010 BREATHING CAPACITY TEST: CPT

## 2025-03-26 PROCEDURE — 95012 NITRIC OXIDE EXP GAS DETER: CPT

## 2025-03-26 PROCEDURE — 99214 OFFICE O/P EST MOD 30 MIN: CPT | Mod: 25

## 2025-04-15 NOTE — CURRENT MEDS
[Takes medication as prescribed] : takes [None] : Patient does not have any barriers to medication adherence 24.4

## 2025-05-13 ENCOUNTER — APPOINTMENT (OUTPATIENT)
Dept: FAMILY MEDICINE | Facility: CLINIC | Age: 75
End: 2025-05-13
Payer: MEDICARE

## 2025-05-13 ENCOUNTER — LABORATORY RESULT (OUTPATIENT)
Age: 75
End: 2025-05-13

## 2025-05-13 VITALS
BODY MASS INDEX: 42.86 KG/M2 | HEIGHT: 61 IN | SYSTOLIC BLOOD PRESSURE: 130 MMHG | DIASTOLIC BLOOD PRESSURE: 80 MMHG | HEART RATE: 68 BPM | RESPIRATION RATE: 17 BRPM | OXYGEN SATURATION: 96 % | WEIGHT: 227 LBS | TEMPERATURE: 98.1 F

## 2025-05-13 DIAGNOSIS — R05.3 CHRONIC COUGH: ICD-10-CM

## 2025-05-13 DIAGNOSIS — R60.0 LOCALIZED EDEMA: ICD-10-CM

## 2025-05-13 DIAGNOSIS — E78.5 HYPERLIPIDEMIA, UNSPECIFIED: ICD-10-CM

## 2025-05-13 DIAGNOSIS — J44.9 CHRONIC OBSTRUCTIVE PULMONARY DISEASE, UNSPECIFIED: ICD-10-CM

## 2025-05-13 DIAGNOSIS — Z00.00 ENCOUNTER FOR GENERAL ADULT MEDICAL EXAMINATION W/OUT ABNORMAL FINDINGS: ICD-10-CM

## 2025-05-13 DIAGNOSIS — L84 CORNS AND CALLOSITIES: ICD-10-CM

## 2025-05-13 DIAGNOSIS — E66.9 OBESITY, UNSPECIFIED: ICD-10-CM

## 2025-05-13 DIAGNOSIS — Z01.818 ENCOUNTER FOR OTHER PREPROCEDURAL EXAMINATION: ICD-10-CM

## 2025-05-13 DIAGNOSIS — Z87.828 PERSONAL HISTORY OF OTHER (HEALED) PHYSICAL INJURY AND TRAUMA: ICD-10-CM

## 2025-05-13 DIAGNOSIS — Z79.01 LONG TERM (CURRENT) USE OF ANTICOAGULANTS: ICD-10-CM

## 2025-05-13 DIAGNOSIS — D64.9 ANEMIA, UNSPECIFIED: ICD-10-CM

## 2025-05-13 DIAGNOSIS — G47.30 SLEEP APNEA, UNSPECIFIED: ICD-10-CM

## 2025-05-13 DIAGNOSIS — R53.81 OTHER MALAISE: ICD-10-CM

## 2025-05-13 DIAGNOSIS — I10 ESSENTIAL (PRIMARY) HYPERTENSION: ICD-10-CM

## 2025-05-13 DIAGNOSIS — F32.A ANXIETY DISORDER, UNSPECIFIED: ICD-10-CM

## 2025-05-13 DIAGNOSIS — F41.9 ANXIETY DISORDER, UNSPECIFIED: ICD-10-CM

## 2025-05-13 DIAGNOSIS — E78.00 PURE HYPERCHOLESTEROLEMIA, UNSPECIFIED: ICD-10-CM

## 2025-05-13 LAB
APPEARANCE: CLEAR
BILIRUBIN URINE: NEGATIVE
BLOOD URINE: ABNORMAL
COLOR: YELLOW
GLUCOSE QUALITATIVE U: NEGATIVE MG/DL
KETONES URINE: NEGATIVE MG/DL
LEUKOCYTE ESTERASE URINE: ABNORMAL
NITRITE URINE: POSITIVE
PH URINE: 6
PROTEIN URINE: NEGATIVE MG/DL
SPECIFIC GRAVITY URINE: 1.01
UROBILINOGEN URINE: 0.2 MG/DL

## 2025-05-13 PROCEDURE — 36415 COLL VENOUS BLD VENIPUNCTURE: CPT

## 2025-05-13 PROCEDURE — G0439: CPT

## 2025-05-14 LAB
25(OH)D3 SERPL-MCNC: 25.1 NG/ML
ALBUMIN SERPL ELPH-MCNC: 4.3 G/DL
ALP BLD-CCNC: 145 U/L
ALT SERPL-CCNC: 19 U/L
ANION GAP SERPL CALC-SCNC: 15 MMOL/L
AST SERPL-CCNC: 21 U/L
BASOPHILS # BLD AUTO: 0.02 K/UL
BASOPHILS NFR BLD AUTO: 0.4 %
BILIRUB SERPL-MCNC: 0.2 MG/DL
BUN SERPL-MCNC: 12 MG/DL
CALCIUM SERPL-MCNC: 9.3 MG/DL
CHLORIDE SERPL-SCNC: 106 MMOL/L
CHOLEST SERPL-MCNC: 168 MG/DL
CO2 SERPL-SCNC: 22 MMOL/L
CREAT SERPL-MCNC: 0.8 MG/DL
EGFRCR SERPLBLD CKD-EPI 2021: 77 ML/MIN/1.73M2
EOSINOPHIL # BLD AUTO: 0.19 K/UL
EOSINOPHIL NFR BLD AUTO: 4.1 %
ESTIMATED AVERAGE GLUCOSE: 105 MG/DL
FERRITIN SERPL-MCNC: 17 NG/ML
FOLATE SERPL-MCNC: >20 NG/ML
GLUCOSE SERPL-MCNC: 83 MG/DL
HBA1C MFR BLD HPLC: 5.3 %
HCT VFR BLD CALC: 39.1 %
HDLC SERPL-MCNC: 98 MG/DL
HGB BLD-MCNC: 12.1 G/DL
IMM GRANULOCYTES NFR BLD AUTO: 0.2 %
IRON SATN MFR SERPL: 16 %
IRON SERPL-MCNC: 50 UG/DL
LDLC SERPL-MCNC: 61 MG/DL
LYMPHOCYTES # BLD AUTO: 0.59 K/UL
LYMPHOCYTES NFR BLD AUTO: 12.7 %
MAN DIFF?: NORMAL
MCHC RBC-ENTMCNC: 28.3 PG
MCHC RBC-ENTMCNC: 30.9 G/DL
MCV RBC AUTO: 91.4 FL
MONOCYTES # BLD AUTO: 0.3 K/UL
MONOCYTES NFR BLD AUTO: 6.4 %
NEUTROPHILS # BLD AUTO: 3.55 K/UL
NEUTROPHILS NFR BLD AUTO: 76.2 %
NONHDLC SERPL-MCNC: 70 MG/DL
PLATELET # BLD AUTO: 229 K/UL
POTASSIUM SERPL-SCNC: 4 MMOL/L
PROT SERPL-MCNC: 6.1 G/DL
RBC # BLD: 4.28 M/UL
RBC # FLD: 13.2 %
SODIUM SERPL-SCNC: 143 MMOL/L
TIBC SERPL-MCNC: 313 UG/DL
TRIGL SERPL-MCNC: 41 MG/DL
TSH SERPL-ACNC: 1.8 UIU/ML
UIBC SERPL-MCNC: 263 UG/DL
VIT B12 SERPL-MCNC: 359 PG/ML
WBC # FLD AUTO: 4.66 K/UL

## 2025-05-20 ENCOUNTER — RX RENEWAL (OUTPATIENT)
Age: 75
End: 2025-05-20

## 2025-05-24 DIAGNOSIS — N39.0 URINARY TRACT INFECTION, SITE NOT SPECIFIED: ICD-10-CM

## 2025-05-24 RX ORDER — NITROFURANTOIN (MONOHYDRATE/MACROCRYSTALS) 25; 75 MG/1; MG/1
100 CAPSULE ORAL TWICE DAILY
Qty: 14 | Refills: 0 | Status: ACTIVE | COMMUNITY
Start: 2025-05-24 | End: 1900-01-01

## 2025-05-28 ENCOUNTER — RX RENEWAL (OUTPATIENT)
Age: 75
End: 2025-05-28

## 2025-06-06 ENCOUNTER — APPOINTMENT (OUTPATIENT)
Dept: PULMONOLOGY | Facility: CLINIC | Age: 75
End: 2025-06-06

## 2025-06-06 ENCOUNTER — APPOINTMENT (OUTPATIENT)
Dept: PULMONOLOGY | Facility: CLINIC | Age: 75
End: 2025-06-06
Payer: MEDICARE

## 2025-06-06 VITALS — HEART RATE: 75 BPM | DIASTOLIC BLOOD PRESSURE: 78 MMHG | SYSTOLIC BLOOD PRESSURE: 137 MMHG | OXYGEN SATURATION: 95 %

## 2025-06-06 PROCEDURE — 94727 GAS DIL/WSHOT DETER LNG VOL: CPT

## 2025-06-06 PROCEDURE — 95012 NITRIC OXIDE EXP GAS DETER: CPT

## 2025-06-06 PROCEDURE — 94729 DIFFUSING CAPACITY: CPT

## 2025-06-06 PROCEDURE — 94010 BREATHING CAPACITY TEST: CPT

## 2025-06-06 PROCEDURE — 99214 OFFICE O/P EST MOD 30 MIN: CPT | Mod: 25

## 2025-06-06 PROCEDURE — ZZZZZ: CPT

## 2025-06-06 RX ORDER — ALBUTEROL SULFATE 90 UG/1
108 (90 BASE) INHALANT RESPIRATORY (INHALATION)
Qty: 1 | Refills: 3 | Status: ACTIVE | COMMUNITY
Start: 2025-06-06 | End: 1900-01-01

## 2025-07-02 RX ORDER — BENZONATATE 100 MG/1
100 CAPSULE ORAL
Qty: 30 | Refills: 1 | Status: ACTIVE | COMMUNITY
Start: 2025-07-02 | End: 1900-01-01

## 2025-07-02 RX ORDER — DOXYCYCLINE HYCLATE 100 MG/1
100 TABLET, COATED ORAL
Qty: 14 | Refills: 0 | Status: ACTIVE | COMMUNITY
Start: 2025-07-02 | End: 1900-01-01

## 2025-07-02 RX ORDER — METHYLPREDNISOLONE 4 MG/1
4 TABLET ORAL
Qty: 1 | Refills: 0 | Status: ACTIVE | COMMUNITY
Start: 2025-07-02 | End: 1900-01-01

## (undated) DEVICE — VALVE SUCTION EVIS 160/200/240

## (undated) DEVICE — ELCTR GROUNDING PAD ADULT COVIDIEN

## (undated) DEVICE — DRAPE 3/4 SHEET 52X76"

## (undated) DEVICE — DURABLE MEDICAL EQUIPMENT: Type: DURABLE MEDICAL EQUIPMENT

## (undated) DEVICE — SOL IRR POUR NS 0.9% 500ML

## (undated) DEVICE — SYR SLIP 10CC

## (undated) DEVICE — ADAPTER FIBEROPTIC BRONCHOSCOPE DUAL AXIS SWIVEL

## (undated) DEVICE — TUBING SUCTION NONCONDUCTIVE 6MM X 12FT

## (undated) DEVICE — VENODYNE/SCD SLEEVE CALF MEDIUM

## (undated) DEVICE — TRAP SPECIMEN SPUTUM 40CC

## (undated) DEVICE — VALVE BIOPSY BRONCHOVIDEOSCOPE

## (undated) DEVICE — DRSG TELFA 3 X 8

## (undated) DEVICE — POSITIONER STRAP ARMBOARD VELCRO TS-30

## (undated) DEVICE — DRSG CURITY GAUZE SPONGE 4 X 4" 12-PLY

## (undated) DEVICE — PROTECTOR HEEL / ELBOW FLUFFY

## (undated) DEVICE — WARMING BLANKET LOWER ADULT